# Patient Record
Sex: FEMALE | Race: WHITE | NOT HISPANIC OR LATINO | Employment: UNEMPLOYED | URBAN - METROPOLITAN AREA
[De-identification: names, ages, dates, MRNs, and addresses within clinical notes are randomized per-mention and may not be internally consistent; named-entity substitution may affect disease eponyms.]

---

## 2017-01-30 ENCOUNTER — PATIENT OUTREACH (OUTPATIENT)
Dept: ADMINISTRATIVE | Facility: HOSPITAL | Age: 72
End: 2017-01-30

## 2017-02-10 ENCOUNTER — OFFICE VISIT (OUTPATIENT)
Dept: INTERNAL MEDICINE | Facility: CLINIC | Age: 72
End: 2017-02-10
Attending: INTERNAL MEDICINE
Payer: COMMERCIAL

## 2017-02-10 VITALS
WEIGHT: 154.75 LBS | BODY MASS INDEX: 25.78 KG/M2 | OXYGEN SATURATION: 98 % | HEIGHT: 65 IN | HEART RATE: 65 BPM | DIASTOLIC BLOOD PRESSURE: 88 MMHG | SYSTOLIC BLOOD PRESSURE: 110 MMHG

## 2017-02-10 DIAGNOSIS — E78.5 HYPERLIPIDEMIA, UNSPECIFIED HYPERLIPIDEMIA TYPE: Primary | ICD-10-CM

## 2017-02-10 DIAGNOSIS — M85.80 OSTEOPENIA: ICD-10-CM

## 2017-02-10 DIAGNOSIS — A15.9 ATYPICAL TB: ICD-10-CM

## 2017-02-10 DIAGNOSIS — E06.3 HASHIMOTO'S THYROIDITIS: ICD-10-CM

## 2017-02-10 DIAGNOSIS — E55.9 VITAMIN D DEFICIENCY: ICD-10-CM

## 2017-02-10 PROCEDURE — 99214 OFFICE O/P EST MOD 30 MIN: CPT | Mod: S$GLB,,, | Performed by: INTERNAL MEDICINE

## 2017-02-10 PROCEDURE — 1157F ADVNC CARE PLAN IN RCRD: CPT | Mod: S$GLB,,, | Performed by: INTERNAL MEDICINE

## 2017-02-10 PROCEDURE — 1159F MED LIST DOCD IN RCRD: CPT | Mod: S$GLB,,, | Performed by: INTERNAL MEDICINE

## 2017-02-10 PROCEDURE — 1160F RVW MEDS BY RX/DR IN RCRD: CPT | Mod: S$GLB,,, | Performed by: INTERNAL MEDICINE

## 2017-02-10 PROCEDURE — 1126F AMNT PAIN NOTED NONE PRSNT: CPT | Mod: S$GLB,,, | Performed by: INTERNAL MEDICINE

## 2017-02-10 PROCEDURE — 99999 PR PBB SHADOW E&M-EST. PATIENT-LVL III: CPT | Mod: PBBFAC,,, | Performed by: INTERNAL MEDICINE

## 2017-02-10 RX ORDER — ATORVASTATIN CALCIUM 10 MG/1
10 TABLET, FILM COATED ORAL DAILY
Qty: 90 TABLET | Refills: 3 | Status: SHIPPED | OUTPATIENT
Start: 2017-02-10 | End: 2018-03-04 | Stop reason: SDUPTHER

## 2017-02-10 RX ORDER — LINEZOLID 600 MG/1
TABLET, FILM COATED ORAL
Refills: 3 | COMMUNITY
Start: 2017-02-06 | End: 2017-07-21 | Stop reason: ALTCHOICE

## 2017-02-10 NOTE — MR AVS SNAPSHOT
[facility]  [address]  [facility] [address]  Phone: [phone]  Fax: [phone]                  Marly Maciel   2/10/2017 9:20 AM   Office Visit    Description:  Female : 1945   Provider:  Stacy Pereira MD   Department:  [facility]           Reason for Visit     Annual Exam           Diagnoses this Visit        Comments    Hyperlipidemia, unspecified hyperlipidemia type    -  Primary     Hashimoto's thyroiditis         Atypical TB         Vitamin D deficiency disease         Vitamin D deficiency         Osteopenia                To Do List           Future Appointments        Provider Department Dept Phone    4/10/2017 8:00 AM LAB, B[facility] [phone]    4/10/2017 8:40 AM [facility] DEXA1 [facility] [phone]      Goals (5 Years of Data)     None      Follow-Up and Disposition     Return in about 1 year (around 2/10/2018), or if symptoms worsen or fail to improve.       These Medications        Disp Refills Start End    atorvastatin (LIPITOR) 10 MG tablet 90 tablet 3 2/10/2017     Take 1 tablet (10 mg total) by mouth once daily. - Oral    Pharmacy: [facility] [address] [place] [address] BILL MOREAU AT [facility] Ph #: [phone]         [facility] On Call     [facility] On Call Nurse Care Line -  Assistance  Registered nurses in the [facility] provide clinical advisement, health education, appointment booking, and other advisory services.  Call for this free service at [phone].             Medications           Message regarding Medications     Verify the changes and/or additions to your medication regime listed below are the same as discussed with your clinician today.  If any of these changes or additions are incorrect, please notify your healthcare provider.        CHANGE how you are taking these medications     Start Taking  "Instead of    atorvastatin (LIPITOR) 10 MG tablet atorvastatin (LIPITOR) 10 MG tablet    Dosage:  Take 1 tablet (10 mg total) by mouth once daily. Dosage:  TAKE ONE TABLET BY MOUTH EVERY DAY    Reason for Change:  Reorder            Verify that the below list of medications is an accurate representation of the medications you are currently taking.  If none reported, the list may be blank. If incorrect, please contact your healthcare provider. Carry this list with you in case of emergency.           Current Medications     aspirin (ECOTRIN) 81 MG EC tablet Take 81 mg by mouth once daily.      atorvastatin (LIPITOR) 10 MG tablet Take 1 tablet (10 mg total) by mouth once daily.    azithromycin (Z-HARSHAD) 250 MG tablet Take 1 tablet (250 mg total) by mouth 3 (three) times a week.    calcium-vitamin D 250-100 mg-unit per tablet Take 1 tablet by mouth 2 (two) times daily.    estrogens, conjugated, (PREMARIN) 0.625 MG tablet Take 0.625 mg by mouth twice a week.      linezolid (ZYVOX) 600 mg Tab TK 1/2 T PO D    benzonatate (TESSALON) 200 MG capsule     cholecalciferol, vitamin D3, (VITAMIN D3) 2,000 unit Tab Take 1 tablet by mouth once daily.      gabapentin (NEURONTIN) 100 MG capsule Take 1 tab in morning and 2 tabs in evening    predniSONE (DELTASONE) 20 MG tablet TAKE 2 TS PO ONCE A DAY FOR 3 DAYS    promethazine-codeine 6.25-10 mg/5 ml (PHENERGAN WITH CODEINE) 6.25-10 mg/5 mL syrup            Clinical Reference Information           Your Vitals Were     BP Pulse Height Weight SpO2 BMI    110/88 (BP Location: Right arm, Patient Position: Sitting, BP Method: Manual) 65 5' 5" (1.651 m) 70.2 kg (154 lb 12.2 oz) 98% 25.75 kg/m2      Blood Pressure          Most Recent Value    BP  110/88      Allergies as of 2/10/2017     Albuterol    Ciprofloxacin      Immunizations Administered on Date of Encounter - 2/10/2017     None      Orders Placed During Today's Visit     Future Labs/Procedures Expected by Expires    DXA Bone Density " Spine And Hip  2/10/2017 2/10/2018    Lipid panel  2/10/2017 2/10/2018    T4, free  2/10/2017 5/11/2017    TSH  2/10/2017 2/10/2018    Vitamin D  2/10/2017 5/11/2017      Instructions    Your test results will be communicated to you via: [facility], Telephone or Letter.  If you have not received your test results within one week. Please contact the clinic at [phone].           Language Assistance Services     ATTENTION: Language assistance services are available, free of charge. Please call [phone].      ATENCIÓN: Si habla español, tiene a amaya disposición servicios gratuitos de asistencia lingüística. Llame al [phone].     CHÚ Ý: N?u b?n nói Ti?ng Vi?t, có các d?ch v? h? tr? ngôn ng? mi?n phí dành cho b?n. G?i s? [phone].         [facility] complies with applicable Federal civil rights laws and does not discriminate on the basis of race, color, national origin, age, disability, or sex.

## 2017-02-10 NOTE — PATIENT INSTRUCTIONS
Your test results will be communicated to you via: [facility], Telephone or Letter.  If you have not received your test results within one week. Please contact the clinic at [phone].

## 2017-02-10 NOTE — PROGRESS NOTES
Subjective:       Patient ID: Marly Maciel is a 71 y.o. female.    Chief Complaint: Annual Exam    Hyperlipidemia   This is a chronic problem. The current episode started more than 1 year ago. The problem is controlled. Recent lipid tests were reviewed and are normal. Exacerbating diseases include hypothyroidism. She has no history of diabetes. There are no known factors aggravating her hyperlipidemia. Pertinent negatives include no chest pain or focal weakness. Current antihyperlipidemic treatment includes statins. The current treatment provides significant improvement of lipids. There are no compliance problems.  Risk factors for coronary artery disease include dyslipidemia, post-menopausal and stress.      Pt here for annual exam.   Is in drug study with pulm in [place] - taking inhaled Rx x 1 year - amikacin and linezolid   Doing well.   utd on prevnar 13  mmg wnl - updated chart  Brought recent labs - cbc and cmp reviewed  [facility]    Review of Systems   Cardiovascular: Negative for chest pain.   Neurological: Negative for focal weakness.       Objective:      Physical Exam   Constitutional: She is oriented to person, place, and time. She appears well-developed and well-nourished.   HENT:   Head: Normocephalic and atraumatic.   Right Ear: External ear normal.   Left Ear: External ear normal.   Nose: Nose normal.   Mouth/Throat: Oropharynx is clear and moist. No oropharyngeal exudate.   No carotid bruits   Eyes: Conjunctivae and EOM are normal.   Neck: Neck supple. No JVD present. No tracheal deviation present. No thyromegaly present.   Cardiovascular: Normal rate, regular rhythm, normal heart sounds and intact distal pulses.    Pulmonary/Chest: Effort normal and breath sounds normal.   Abdominal: Soft. Bowel sounds are normal.   Musculoskeletal: She exhibits no edema or tenderness.   Lymphadenopathy:     She has no cervical adenopathy.   Neurological: She is alert and oriented to  person, place, and time. Coordination normal.   Skin: Skin is warm and dry.   Psychiatric: She has a normal mood and affect. Her behavior is normal. Judgment and thought content normal.       Assessment:       Marly was seen today for annual exam.    Diagnoses and all orders for this visit:    Hyperlipidemia, unspecified hyperlipidemia type: stable, cont med  -     Lipid panel; Future    Hashimoto's thyroiditis: stable, cont med, check labs  -     TSH; Future  -     T4, free; Future    Atypical TB: followed by  pulm - currently in Rx study through pulm in [place]    Vitamin D deficiency: repeat level, cont daily otc suppl  -     Vitamin D; Future  -     DXA Bone Density Spine And Hip; Future    Osteopenia: rec otc supplement of calcium 1200mg and vit d 800u divided into 2 doses daily along with reg exercise  -     DXA Bone Density Spine And Hip; Future    Other orders  -     atorvastatin (LIPITOR) 10 MG tablet; Take 1 tablet (10 mg total) by mouth once daily.

## 2017-04-10 ENCOUNTER — HOSPITAL ENCOUNTER (OUTPATIENT)
Dept: RADIOLOGY | Facility: OTHER | Age: 72
Discharge: HOME | End: 2017-04-10
Attending: INTERNAL MEDICINE
Payer: COMMERCIAL

## 2017-04-10 DIAGNOSIS — M85.80 OSTEOPENIA: ICD-10-CM

## 2017-04-10 DIAGNOSIS — E55.9 VITAMIN D DEFICIENCY: ICD-10-CM

## 2017-04-10 PROCEDURE — 77080 DXA BONE DENSITY AXIAL: CPT | Mod: 26,,, | Performed by: RADIOLOGY

## 2017-04-10 PROCEDURE — 77080 DXA BONE DENSITY AXIAL: CPT | Mod: TC

## 2017-04-11 ENCOUNTER — TELEPHONE (OUTPATIENT)
Dept: INTERNAL MEDICINE | Facility: CLINIC | Age: 72
End: 2017-04-11

## 2017-07-10 DIAGNOSIS — Z12.11 COLON CANCER SCREENING: ICD-10-CM

## 2017-07-21 ENCOUNTER — OFFICE VISIT (OUTPATIENT)
Dept: INTERNAL MEDICINE | Facility: CLINIC | Age: 72
End: 2017-07-21
Attending: INTERNAL MEDICINE
Payer: COMMERCIAL

## 2017-07-21 ENCOUNTER — LAB VISIT (OUTPATIENT)
Dept: LAB | Facility: OTHER | Age: 72
End: 2017-07-21
Attending: INTERNAL MEDICINE
Payer: COMMERCIAL

## 2017-07-21 VITALS
WEIGHT: 152.13 LBS | DIASTOLIC BLOOD PRESSURE: 80 MMHG | SYSTOLIC BLOOD PRESSURE: 130 MMHG | HEART RATE: 80 BPM | HEIGHT: 65 IN | BODY MASS INDEX: 25.34 KG/M2 | OXYGEN SATURATION: 96 %

## 2017-07-21 DIAGNOSIS — E06.3 HASHIMOTO'S THYROIDITIS: ICD-10-CM

## 2017-07-21 DIAGNOSIS — E89.0 S/P PARTIAL THYROIDECTOMY: ICD-10-CM

## 2017-07-21 DIAGNOSIS — R00.2 PALPITATION: Primary | ICD-10-CM

## 2017-07-21 DIAGNOSIS — R00.2 PALPITATION: ICD-10-CM

## 2017-07-21 LAB
ALBUMIN SERPL BCP-MCNC: 3.9 G/DL
ALP SERPL-CCNC: 66 U/L
ALT SERPL W/O P-5'-P-CCNC: 17 U/L
ANION GAP SERPL CALC-SCNC: 11 MMOL/L
AST SERPL-CCNC: 22 U/L
BASOPHILS # BLD AUTO: 0.03 K/UL
BASOPHILS NFR BLD: 0.5 %
BILIRUB SERPL-MCNC: 0.6 MG/DL
BILIRUB UR QL STRIP: NEGATIVE
BUN SERPL-MCNC: 14 MG/DL
CALCIUM SERPL-MCNC: 9.7 MG/DL
CHLORIDE SERPL-SCNC: 104 MMOL/L
CLARITY UR: CLEAR
CO2 SERPL-SCNC: 27 MMOL/L
COLOR UR: YELLOW
CREAT SERPL-MCNC: 0.8 MG/DL
DIFFERENTIAL METHOD: ABNORMAL
EOSINOPHIL # BLD AUTO: 0.2 K/UL
EOSINOPHIL NFR BLD: 2.7 %
ERYTHROCYTE [DISTWIDTH] IN BLOOD BY AUTOMATED COUNT: 12.8 %
EST. GFR  (AFRICAN AMERICAN): >60 ML/MIN/1.73 M^2
EST. GFR  (NON AFRICAN AMERICAN): >60 ML/MIN/1.73 M^2
GLUCOSE SERPL-MCNC: 90 MG/DL
GLUCOSE UR QL STRIP: NEGATIVE
HCT VFR BLD AUTO: 46.2 %
HGB BLD-MCNC: 15.9 G/DL
HGB UR QL STRIP: NEGATIVE
KETONES UR QL STRIP: NEGATIVE
LEUKOCYTE ESTERASE UR QL STRIP: NEGATIVE
LYMPHOCYTES # BLD AUTO: 1.3 K/UL
LYMPHOCYTES NFR BLD: 19.6 %
MCH RBC QN AUTO: 32.3 PG
MCHC RBC AUTO-ENTMCNC: 34.4 G/DL
MCV RBC AUTO: 94 FL
MONOCYTES # BLD AUTO: 0.5 K/UL
MONOCYTES NFR BLD: 8.1 %
NEUTROPHILS # BLD AUTO: 4.5 K/UL
NEUTROPHILS NFR BLD: 68.9 %
NITRITE UR QL STRIP: NEGATIVE
PH UR STRIP: 6 [PH] (ref 5–8)
PLATELET # BLD AUTO: 216 K/UL
PMV BLD AUTO: 10.1 FL
POTASSIUM SERPL-SCNC: 4.8 MMOL/L
PROT SERPL-MCNC: 7 G/DL
PROT UR QL STRIP: NEGATIVE
RBC # BLD AUTO: 4.93 M/UL
SODIUM SERPL-SCNC: 142 MMOL/L
SP GR UR STRIP: 1.01 (ref 1–1.03)
TSH SERPL DL<=0.005 MIU/L-ACNC: 1.52 UIU/ML
URN SPEC COLLECT METH UR: NORMAL
UROBILINOGEN UR STRIP-ACNC: NEGATIVE EU/DL
WBC # BLD AUTO: 6.58 K/UL

## 2017-07-21 PROCEDURE — 93010 ELECTROCARDIOGRAM REPORT: CPT | Mod: S$GLB,,, | Performed by: INTERNAL MEDICINE

## 2017-07-21 PROCEDURE — 99213 OFFICE O/P EST LOW 20 MIN: CPT | Mod: S$GLB,,, | Performed by: INTERNAL MEDICINE

## 2017-07-21 PROCEDURE — 81003 URINALYSIS AUTO W/O SCOPE: CPT

## 2017-07-21 PROCEDURE — 85025 COMPLETE CBC W/AUTO DIFF WBC: CPT

## 2017-07-21 PROCEDURE — 99999 PR PBB SHADOW E&M-EST. PATIENT-LVL IV: CPT | Mod: PBBFAC,,, | Performed by: INTERNAL MEDICINE

## 2017-07-21 PROCEDURE — 87086 URINE CULTURE/COLONY COUNT: CPT

## 2017-07-21 PROCEDURE — 80053 COMPREHEN METABOLIC PANEL: CPT

## 2017-07-21 PROCEDURE — 1159F MED LIST DOCD IN RCRD: CPT | Mod: S$GLB,,, | Performed by: INTERNAL MEDICINE

## 2017-07-21 PROCEDURE — 93005 ELECTROCARDIOGRAM TRACING: CPT | Mod: S$GLB,,, | Performed by: INTERNAL MEDICINE

## 2017-07-21 PROCEDURE — 36415 COLL VENOUS BLD VENIPUNCTURE: CPT

## 2017-07-21 PROCEDURE — 1126F AMNT PAIN NOTED NONE PRSNT: CPT | Mod: S$GLB,,, | Performed by: INTERNAL MEDICINE

## 2017-07-21 PROCEDURE — 84443 ASSAY THYROID STIM HORMONE: CPT

## 2017-07-21 NOTE — PROGRESS NOTES
Subjective:       Patient ID: Marly Maciel is a 71 y.o. female.    Chief Complaint: Irregular Heart Beat    HPI     Pt here for  appt for irreg heart beat first noticed Sunday which is now x 6 days. Was lying in bed when first noticed. Reports was not feeling well Sunday with upset  Stomach described as queezy. No diarrhea or vomiting. No known sick contacts. No chest pain or pressure with this.   Reports no lightheadedness, syncope.   Has hx of atypical mycobacterium and followed by pulm in [place] - symptoms stable - has baseline [place] with going up a flight of stairs but this is unchanged from baseline.   No LE edema, orthopnea, parox nocturnal dyspnea  Reports no worsening factors. Improves with activity.   Reports drinks an ice coffee every morning - this does not affect pulse and intake of this unchanged. Will have maybe 4 oz of diet coke with lunch. No other caffeine intake  Has increased fluid intake over the past week. Still having intermittent palpitations but much less frequency. Feels better overall today.      Is no longer in trial for hx of MAC - sunshine meds x 2 months and then stopped after developed thrush    Nonsmoker. No hx of heart disease.   Denies urinary frequency, urgency, burning with urination, hematuria, foul smelling urine, cloudy urine, lower back pain, suprapubic pain or pressure.     Review of Systems   Constitutional: Positive for fatigue. Negative for chills, diaphoresis, fever and unexpected weight change.   HENT: Negative for congestion, ear pain and sore throat.    Respiratory: Negative for chest tightness and wheezing.    Cardiovascular: Positive for palpitations. Negative for chest pain and leg swelling.   Gastrointestinal: Negative for abdominal pain and diarrhea.   Musculoskeletal: Negative for joint swelling and myalgias.   Skin: Negative for color change, pallor and rash.   Neurological: Negative for dizziness, facial asymmetry, speech difficulty, numbness and headaches.        Objective:      Physical Exam   Constitutional: She is oriented to person, place, and time. She appears well-developed and well-nourished.   HENT:   Head: Normocephalic and atraumatic.   Right Ear: External ear normal.   Left Ear: External ear normal.   Nose: Nose normal.   Mouth/Throat: Oropharynx is clear and moist. No oropharyngeal exudate.   Eyes: Conjunctivae and EOM are normal.   Neck: Neck supple.   Cardiovascular: Normal rate, regular rhythm and intact distal pulses.    Pulmonary/Chest: Effort normal. She has no wheezes.   Abdominal: Soft. Normal appearance and bowel sounds are normal.   Musculoskeletal: She exhibits no edema or tenderness.   Lymphadenopathy:     She has no cervical adenopathy.   Neurological: She is alert and oriented to person, place, and time. She has normal strength. Gait normal.   Skin: Skin is warm, dry and intact. Capillary refill takes less than 2 seconds. No cyanosis. Nails show no clubbing.   Psychiatric: She has a normal mood and affect. Her speech is normal and behavior is normal. Cognition and memory are normal.       Assessment:     Marly was seen today for irregular heart beat.    Diagnoses and all orders for this visit:    Palpitation  -     IN OFFICE EKG 12-LEAD (to Muse)  -     Urinalysis  -     CULTURE, URINE  -     TSH; Future  -     Comprehensive metabolic panel; Future  -     Holter monitor - 48 hour; Future  -     Ambulatory Referral to Cardiology  -     CBC auto differential; Future    S/P partial thyroidectomy  -     TSH; Future    Hashimoto's thyroiditis  -     TSH; Future    ER and RTC prompts given  Check labs   EKG reviewed in clinic today and mild wondering baseline, NSR, no st seg changes or twi  Order holter and cards f/u for symptoms if labs unremarkable for cause  Avoid caffeine.

## 2017-07-22 LAB — BACTERIA UR CULT: NORMAL

## 2017-07-24 ENCOUNTER — PATIENT MESSAGE (OUTPATIENT)
Dept: INTERNAL MEDICINE | Facility: CLINIC | Age: 72
End: 2017-07-24

## 2017-11-21 ENCOUNTER — OFFICE VISIT (OUTPATIENT)
Dept: INTERNAL MEDICINE | Facility: CLINIC | Age: 72
End: 2017-11-21
Attending: INTERNAL MEDICINE
Payer: COMMERCIAL

## 2017-11-21 ENCOUNTER — HOSPITAL ENCOUNTER (OUTPATIENT)
Dept: RADIOLOGY | Facility: HOSPITAL | Age: 72
Discharge: HOME | End: 2017-11-21
Attending: INTERNAL MEDICINE
Payer: COMMERCIAL

## 2017-11-21 VITALS
OXYGEN SATURATION: 96 % | HEIGHT: 65 IN | WEIGHT: 152.13 LBS | HEART RATE: 74 BPM | BODY MASS INDEX: 25.34 KG/M2 | SYSTOLIC BLOOD PRESSURE: 124 MMHG | DIASTOLIC BLOOD PRESSURE: 80 MMHG

## 2017-11-21 DIAGNOSIS — J47.1 BRONCHIECTASIS WITH ACUTE EXACERBATION: ICD-10-CM

## 2017-11-21 DIAGNOSIS — R05.9 COUGH: ICD-10-CM

## 2017-11-21 DIAGNOSIS — R04.2 HEMOPTYSIS: ICD-10-CM

## 2017-11-21 DIAGNOSIS — R04.2 HEMOPTYSIS: Primary | ICD-10-CM

## 2017-11-21 DIAGNOSIS — A15.9 ATYPICAL TB: ICD-10-CM

## 2017-11-21 PROCEDURE — 71250 CT THORAX DX C-: CPT | Mod: TC

## 2017-11-21 PROCEDURE — 99999 PR PBB SHADOW E&M-EST. PATIENT-LVL III: CPT | Mod: PBBFAC,,, | Performed by: INTERNAL MEDICINE

## 2017-11-21 PROCEDURE — 71250 CT THORAX DX C-: CPT | Mod: 26,,, | Performed by: RADIOLOGY

## 2017-11-21 PROCEDURE — 99213 OFFICE O/P EST LOW 20 MIN: CPT | Mod: S$GLB,,, | Performed by: INTERNAL MEDICINE

## 2017-11-21 RX ORDER — CEFDINIR 300 MG/1
300 CAPSULE ORAL EVERY 12 HOURS
Qty: 20 CAPSULE | Refills: 0 | Status: SHIPPED | OUTPATIENT
Start: 2017-11-21 | End: 2017-12-01

## 2017-11-21 RX ORDER — LIDOCAINE HYDROCHLORIDE 20 MG/ML
SOLUTION ORAL; TOPICAL
COMMUNITY
Start: 2017-08-27 | End: 2018-07-06 | Stop reason: ALTCHOICE

## 2017-11-21 RX ORDER — PROMETHAZINE HYDROCHLORIDE AND CODEINE PHOSPHATE 6.25; 1 MG/5ML; MG/5ML
5 SOLUTION ORAL EVERY 6 HOURS PRN
Qty: 240 ML | Refills: 0 | Status: SHIPPED | OUTPATIENT
Start: 2017-11-21 | End: 2018-04-23 | Stop reason: SDUPTHER

## 2017-11-21 RX ORDER — SULFAMETHOXAZOLE AND TRIMETHOPRIM 800; 160 MG/1; MG/1
TABLET ORAL
COMMUNITY
Start: 2017-11-13 | End: 2018-07-06 | Stop reason: ALTCHOICE

## 2017-11-21 RX ORDER — HYDROCHLOROTHIAZIDE 25 MG/1
TABLET ORAL
COMMUNITY
Start: 2017-11-18 | End: 2018-08-03

## 2017-11-21 RX ORDER — CLOTRIMAZOLE 10 MG/1
LOZENGE ORAL; TOPICAL
COMMUNITY
Start: 2017-08-27 | End: 2018-07-06 | Stop reason: ALTCHOICE

## 2017-11-21 NOTE — PROGRESS NOTES
"Subjective:   Patient ID: Marly Maciel is a 71 y.o. female  Chief complaint:   Chief Complaint   Patient presents with    Cough     coughing up blood       HPI    Pt here for UC appt for productive cough  Started feeling poorly about 2 weeks ago. + sick contact was  with similar symptoms. Her cough progressed. Had fevers at onset of symptosms x 2 days - none since then.   Has hx of bronchiectasis and pulm MAC and pulm pseudomonas colonization followed by pulm/ID specialists in [place] - Dr. Brayden Marks in [place], [place] Taking azithromycin 250mg daily at baseline.   allx to cipro so avoiding FQ  Has had multiple sputum cx throughout the years due to issues above  Was given bactrim by pulm in [place] now about 9 days ago and has 1 day left of medication - was feeling better  And then Sunday then started feeling poor again - reports coughing increased.   At baseline she will on occ cough up blood - at times it can be scant or up to teaspoon when this occurs.   Yesterday when started coughing and then noticed coughing up blood in sputum. Coughed up more yesterday - described as bright red and brown at times, about a teaspoon x several episodes > 5. Last episode was a few hours before this appt.   Just resumed taking a baby aspirin after had botox 3 weeks ago  No chest pressure, no wheezing, no sob, no orthopnea. Breathing comfortably at this time.     Review of Systems    Objective:  Vitals:    11/21/17 1521   BP: 124/80   Pulse: 74   SpO2: 96%   Weight: 69 kg (152 lb 1.9 oz)   Height: 5' 5" (1.651 m)     Body mass index is 25.31 kg/m².    Physical Exam   Constitutional: She is oriented to person, place, and time. She appears well-developed and well-nourished.   HENT:   Head: Normocephalic and atraumatic.   Right Ear: External ear normal.   Left Ear: External ear normal.   Nose: Nose normal.   Mouth/Throat: Oropharynx is clear and moist. No oropharyngeal exudate.   No blood in nostrils or at post pharynx    Eyes: " Conjunctivae and EOM are normal.   Neck: Normal range of motion. Neck supple.   Cardiovascular: Normal rate, regular rhythm and intact distal pulses.    Pulmonary/Chest: Effort normal and breath sounds normal. No respiratory distress. She has no rales.   Talking in complete sentences  Few crackles heard in bilateral lung fields    Abdominal: Soft. Normal appearance and bowel sounds are normal.   Musculoskeletal: She exhibits no edema or tenderness.   Neurological: She is alert and oriented to person, place, and time. She has normal strength. Gait normal.   Skin: Skin is warm, dry and intact. Capillary refill takes less than 2 seconds. No cyanosis. No pallor. Nails show no clubbing.   No cyanosis   Psychiatric: She has a normal mood and affect. Her speech is normal and behavior is normal. Cognition and memory are normal.   Vitals reviewed.    Assessment:  1. Hemoptysis    2. Bronchiectasis with acute exacerbation    3. Atypical TB    4. Cough        Plan:  Marly was seen today for cough.    Diagnoses and all orders for this visit:    Hemoptysis  -     CT Chest Without Contrast; Future    Bronchiectasis with acute exacerbation  -     CT Chest Without Contrast; Future    Atypical TB    Cough    Other orders  -     promethazine-codeine 6.25-10 mg/5 ml (PHENERGAN WITH CODEINE) 6.25-10 mg/5 mL syrup; Take 5 mLs by mouth every 6 (six) hours as needed for Cough. Do not exceed 30ml/day  -     cefdinir (OMNICEF) 300 MG capsule; Take 1 capsule (300 mg total) by mouth every 12 (twelve) hours.    Called Dr. Brayden Marks ([phone] and [phone]) and reviewed case and details with him today.   rec stop asa   Monitor hemoptysis - reviewed ER and RTC prompts with them - if symptoms worsen or do no resolve over next 1-2 days or develops sob or new fever, will go to ER.   Reviewed respiratory cx data with Dr. Marks and will stop bactrim and start omnicef   rec pt start probiotic with this.   F/u CT scan results when  return  Keep appt with specialist   Cough med refill given    Health Maintenance   Topic Date Due    Fecal Occult Blood Test (FOBT)/FitKit  1945    Pneumococcal (65+) (2 of 2 - PPSV23) 08/30/2017    Mammogram  10/04/2017    DEXA SCAN  04/10/2020    Lipid Panel  04/10/2022    TETANUS VACCINE  06/14/2026    Hepatitis C Screening  Completed    Zoster Vaccine  Addressed    Influenza Vaccine  Completed

## 2017-11-22 ENCOUNTER — PATIENT MESSAGE (OUTPATIENT)
Dept: INTERNAL MEDICINE | Facility: CLINIC | Age: 72
End: 2017-11-22

## 2017-11-22 PROBLEM — Z87.01 HISTORY OF PSEUDOMONAS PNEUMONIA: Status: ACTIVE | Noted: 2017-11-22

## 2017-11-23 ENCOUNTER — TELEPHONE (OUTPATIENT)
Dept: INTERNAL MEDICINE | Facility: CLINIC | Age: 72
End: 2017-11-23

## 2017-11-23 NOTE — TELEPHONE ENCOUNTER
Late entry: called pt 11/22/2017 after ct scan results and reviewed results with her. Denies hemoptysis with brb since before clinic appt yesterday - started abx. Reviewed er and rtc prompts. All questions were answered and pt verbalized understanding of plan.

## 2018-03-05 RX ORDER — ATORVASTATIN CALCIUM 10 MG/1
TABLET, FILM COATED ORAL
Qty: 90 TABLET | Refills: 0 | Status: SHIPPED | OUTPATIENT
Start: 2018-03-05 | End: 2018-05-31 | Stop reason: SDUPTHER

## 2018-04-23 RX ORDER — PROMETHAZINE HYDROCHLORIDE AND CODEINE PHOSPHATE 6.25; 1 MG/5ML; MG/5ML
SOLUTION ORAL
Qty: 180 ML | Refills: 0 | Status: SHIPPED | OUTPATIENT
Start: 2018-04-23 | End: 2018-07-06 | Stop reason: SDUPTHER

## 2018-05-31 RX ORDER — ATORVASTATIN CALCIUM 10 MG/1
TABLET, FILM COATED ORAL
Qty: 90 TABLET | Refills: 0 | Status: SHIPPED | OUTPATIENT
Start: 2018-05-31 | End: 2018-09-18 | Stop reason: SDUPTHER

## 2018-05-31 NOTE — TELEPHONE ENCOUNTER
----- Message from Gwendolyn Calvillo sent at 5/31/2018  9:35 AM CDT -----  Contact: Pharmacist with [facility] / #  [phone]                                          PHARMACY  REFILL  REQUEST      Please refill the medication(s) listed below. Please call the patient when the prescription(s) is ready for  at this phone number: ALAN CORTEZ / # [phone]        Medication #1   atorvastatin (LIPITOR) 10 MG tablet  /  90 DAY SUPPLY    Preferred Pharmacy: [facility] [address]     [phone] (Phone)  [phone] (Fax

## 2018-07-05 ENCOUNTER — TELEPHONE (OUTPATIENT)
Dept: ADMINISTRATIVE | Facility: HOSPITAL | Age: 73
End: 2018-07-05

## 2018-07-06 ENCOUNTER — OFFICE VISIT (OUTPATIENT)
Dept: INTERNAL MEDICINE | Facility: CLINIC | Age: 73
End: 2018-07-06
Attending: INTERNAL MEDICINE
Payer: MEDICARE

## 2018-07-06 VITALS
HEIGHT: 65 IN | WEIGHT: 135.81 LBS | HEART RATE: 71 BPM | OXYGEN SATURATION: 97 % | BODY MASS INDEX: 22.63 KG/M2 | SYSTOLIC BLOOD PRESSURE: 134 MMHG | DIASTOLIC BLOOD PRESSURE: 80 MMHG

## 2018-07-06 DIAGNOSIS — E55.9 VITAMIN D DEFICIENCY DISEASE: ICD-10-CM

## 2018-07-06 DIAGNOSIS — E78.5 HYPERLIPIDEMIA, UNSPECIFIED HYPERLIPIDEMIA TYPE: Primary | ICD-10-CM

## 2018-07-06 DIAGNOSIS — R63.4 WEIGHT LOSS: ICD-10-CM

## 2018-07-06 DIAGNOSIS — Z86.2 HISTORY OF SARCOIDOSIS: ICD-10-CM

## 2018-07-06 DIAGNOSIS — Z12.11 SCREENING FOR COLON CANCER: ICD-10-CM

## 2018-07-06 DIAGNOSIS — E06.3 HASHIMOTO'S THYROIDITIS: ICD-10-CM

## 2018-07-06 DIAGNOSIS — I10 HYPERTENSION, BENIGN: ICD-10-CM

## 2018-07-06 DIAGNOSIS — J47.9 BRONCHIECTASIS WITHOUT COMPLICATION: ICD-10-CM

## 2018-07-06 PROCEDURE — 99999 PR PBB SHADOW E&M-EST. PATIENT-LVL III: CPT | Mod: PBBFAC,,, | Performed by: INTERNAL MEDICINE

## 2018-07-06 PROCEDURE — 99213 OFFICE O/P EST LOW 20 MIN: CPT | Mod: PBBFAC | Performed by: INTERNAL MEDICINE

## 2018-07-06 PROCEDURE — 99214 OFFICE O/P EST MOD 30 MIN: CPT | Mod: S$PBB,,, | Performed by: INTERNAL MEDICINE

## 2018-07-06 RX ORDER — PROMETHAZINE HYDROCHLORIDE AND CODEINE PHOSPHATE 6.25; 1 MG/5ML; MG/5ML
SOLUTION ORAL
Qty: 180 ML | Refills: 0 | Status: SHIPPED | OUTPATIENT
Start: 2018-07-06 | End: 2019-02-01 | Stop reason: SDUPTHER

## 2018-07-06 NOTE — PROGRESS NOTES
Subjective:   Patient ID: Marly Maciel is a 72 y.o. female  Chief complaint:   Chief Complaint   Patient presents with    Annual Exam       Hyperlipidemia   This is a chronic problem. The current episode started more than 1 year ago. The problem is controlled. Recent lipid tests were reviewed and are normal. She has no history of chronic renal disease or diabetes. There are no known factors aggravating her hyperlipidemia. Pertinent negatives include no focal sensory loss or myalgias. Current antihyperlipidemic treatment includes statins. The current treatment provides significant improvement of lipids. There are no compliance problems.  Risk factors for coronary artery disease include dyslipidemia, hypertension and post-menopausal.     Here for annual exam     HLD: sunshine lipitor  HTN: controlled on hctz    Has hx of bronchiectasis and pulm MAC and pulm pseudomonas colonization followed by pulm/ID specialists in [place] - Dr. Brayden Marks in [place], [place] Taking azithromycin 250mg daily at baseline  - taken off of asa due to intermittent hemoptysis while on this - none since stopping asa Nov 2017  - going to see pulm next week and has PFTs and chest CT at that time    Has had unintentional wt loss over the past few months  Wt 152 down to 135 - reports lost desire to eat. Will eat dilia crackers and PB and 1/2 turkey sandwich   - reports had unexplained wt loss at time of dx in 2009 of bronchiectasis and mac but stable since then   - poor appetite in general and early satiety  No abd pain or brbpr  - is still exercising - walking 2-2.5 mi 5 days per week  - no new masses or LAD    Had upper resp illness Good Friday x 2 weeks and again around 4 weeks ago x 2 weeks - at that time had low grade fevers  and inc cough - no hemoptysis   - reports this week cough improved improved to baseline- no fevers since then     -  history of hypercalcemia and hyperparathyroidism that was diagnosed and treated with surgery  "4/8/2014 at [facility].   She also underwent a partial thyroidectomy (left side)   Was on levothyroxine in past but TSH normal off of med  - followed by endocrine in the past     Had MMG recently at [facility] and wnl - due in Sept - ordered by Dr. Reyes - gyn    Osteopenia: taking tyson and MVI and exercising above     Review of Systems   Musculoskeletal: Negative for myalgias.       Objective:  Vitals:    07/06/18 0842   BP: 134/80   Pulse: 71   SpO2: 97%   Weight: 61.6 kg (135 lb 12.9 oz)   Height: 5' 5" (1.651 m)     Body mass index is 22.6 kg/m².    Physical Exam   Constitutional: She is oriented to person, place, and time. She appears well-developed and well-nourished.   Talking in complete sentences  No use of acces mm of respiration   HENT:   Head: Normocephalic and atraumatic.   Right Ear: External ear normal.   Left Ear: External ear normal.   Nose: Nose normal.   Mouth/Throat: Oropharynx is clear and moist. No oropharyngeal exudate.   No carotid bruits   Eyes: Conjunctivae and EOM are normal.   Neck: Neck supple. No thyromegaly present.   Cardiovascular: Normal rate, regular rhythm, normal heart sounds and intact distal pulses.    Pulmonary/Chest: Effort normal. She has no wheezes. She has no rales.   Few scattered rhonchi   Abdominal: Soft. Bowel sounds are normal.   Musculoskeletal: She exhibits no edema or tenderness.   Lymphadenopathy:     She has no cervical adenopathy.   Neurological: She is alert and oriented to person, place, and time.   Skin: Skin is warm and dry.   Psychiatric: Her behavior is normal. Thought content normal.   Vitals reviewed.    Assessment:  1. Hyperlipidemia, unspecified hyperlipidemia type    2. Vitamin D deficiency disease    3. Hashimoto's thyroiditis    4. Weight loss    5. Bronchiectasis without complication    6. History of sarcoidosis    7. Screening for colon cancer    8. Hypertension, benign        Plan:  Marly was seen today for annual exam.    Diagnoses and all " orders for this visit:    Hyperlipidemia, unspecified hyperlipidemia type  -     CBC auto differential; Future  -     Comprehensive metabolic panel; Future  -     TSH; Future  -     Lipid panel; Future  Controlled, cont med     Vitamin D deficiency disease  -     Vitamin D; Future  Resume daily vit d   Check level     Hashimoto's thyroiditis  Check TSH    Weight loss  -     CBC auto differential; Future  -     Comprehensive metabolic panel; Future  -     TSH; Future  -     Ambulatory referral to Gastroenterology  mmg utxi  Has f/u with pulm next week and CT chest scheduled  She agrees to schedule f/u with Dr. Hamilton for cscope as is due - also rec EGD to eval for wt loss/early satiety  She agrees to discuss this with Dr. Hamilton and arrange both  May be due to recent uri and chronic lung disease  rec inc intake - frequent small meals, boost or ensure in bt meals  rtc in 4 weeks for wt check  Consider further labs and ct abd/pelvis as that time pending review of pulm studies/notes and GI eval   rtc prompts discussed     Bronchiectasis without complication  -     promethazine-codeine 6.25-10 mg/5 ml (PHENERGAN WITH CODEINE) 6.25-10 mg/5 mL syrup; TAKE 5 ML BY MOUTH EVERY 6 HOURS AS NEEDED COUGH. DO NOT EXCEED 30 ML A DAY  Refill for prn use given    History of sarcoidosis  As above     Screening for colon cancer  -     Ambulatory referral to Gastroenterology    HTN: controlled - cont med    Health Maintenance   Topic Date Due    Fecal Occult Blood Test (FOBT)/FitKit  1945    Pneumococcal (65+) (2 of 2 - PPSV23) 08/30/2017    Mammogram  10/04/2017    Influenza Vaccine  08/01/2018    DEXA SCAN  04/10/2020    Lipid Panel  04/10/2022    TETANUS VACCINE  06/14/2026    Hepatitis C Screening  Completed    Zoster Vaccine  Addressed

## 2018-07-09 ENCOUNTER — LAB VISIT (OUTPATIENT)
Dept: LAB | Facility: OTHER | Age: 73
End: 2018-07-09
Attending: INTERNAL MEDICINE
Payer: MEDICARE

## 2018-07-09 DIAGNOSIS — E78.5 HYPERLIPIDEMIA, UNSPECIFIED HYPERLIPIDEMIA TYPE: ICD-10-CM

## 2018-07-09 DIAGNOSIS — R63.4 WEIGHT LOSS: ICD-10-CM

## 2018-07-09 DIAGNOSIS — E55.9 VITAMIN D DEFICIENCY DISEASE: ICD-10-CM

## 2018-07-09 LAB
25(OH)D3+25(OH)D2 SERPL-MCNC: 49 NG/ML
ALBUMIN SERPL BCP-MCNC: 3.9 G/DL
ALP SERPL-CCNC: 62 U/L
ALT SERPL W/O P-5'-P-CCNC: 16 U/L
ANION GAP SERPL CALC-SCNC: 8 MMOL/L
AST SERPL-CCNC: 25 U/L
BASOPHILS # BLD AUTO: 0.01 K/UL
BASOPHILS NFR BLD: 0.2 %
BILIRUB SERPL-MCNC: 0.8 MG/DL
BUN SERPL-MCNC: 14 MG/DL
CALCIUM SERPL-MCNC: 9.6 MG/DL
CHLORIDE SERPL-SCNC: 104 MMOL/L
CHOLEST SERPL-MCNC: 160 MG/DL
CHOLEST/HDLC SERPL: 2.4 {RATIO}
CO2 SERPL-SCNC: 30 MMOL/L
CREAT SERPL-MCNC: 0.7 MG/DL
DIFFERENTIAL METHOD: ABNORMAL
EOSINOPHIL # BLD AUTO: 0.1 K/UL
EOSINOPHIL NFR BLD: 2.1 %
ERYTHROCYTE [DISTWIDTH] IN BLOOD BY AUTOMATED COUNT: 13.4 %
EST. GFR  (AFRICAN AMERICAN): >60 ML/MIN/1.73 M^2
EST. GFR  (NON AFRICAN AMERICAN): >60 ML/MIN/1.73 M^2
GLUCOSE SERPL-MCNC: 92 MG/DL
HCT VFR BLD AUTO: 43.3 %
HDLC SERPL-MCNC: 67 MG/DL
HDLC SERPL: 41.9 %
HGB BLD-MCNC: 14.1 G/DL
LDLC SERPL CALC-MCNC: 78.4 MG/DL
LYMPHOCYTES # BLD AUTO: 1.2 K/UL
LYMPHOCYTES NFR BLD: 26.4 %
MCH RBC QN AUTO: 31.4 PG
MCHC RBC AUTO-ENTMCNC: 32.6 G/DL
MCV RBC AUTO: 96 FL
MONOCYTES # BLD AUTO: 0.4 K/UL
MONOCYTES NFR BLD: 9.9 %
NEUTROPHILS # BLD AUTO: 2.7 K/UL
NEUTROPHILS NFR BLD: 61.2 %
NONHDLC SERPL-MCNC: 93 MG/DL
PLATELET # BLD AUTO: 212 K/UL
PMV BLD AUTO: 9.9 FL
POTASSIUM SERPL-SCNC: 4.8 MMOL/L
PROT SERPL-MCNC: 6.8 G/DL
RBC # BLD AUTO: 4.49 M/UL
SODIUM SERPL-SCNC: 142 MMOL/L
TRIGL SERPL-MCNC: 73 MG/DL
TSH SERPL DL<=0.005 MIU/L-ACNC: 1.33 UIU/ML
WBC # BLD AUTO: 4.36 K/UL

## 2018-07-09 PROCEDURE — 80061 LIPID PANEL: CPT

## 2018-07-09 PROCEDURE — 82306 VITAMIN D 25 HYDROXY: CPT

## 2018-07-09 PROCEDURE — 85025 COMPLETE CBC W/AUTO DIFF WBC: CPT

## 2018-07-09 PROCEDURE — 36415 COLL VENOUS BLD VENIPUNCTURE: CPT

## 2018-07-09 PROCEDURE — 80053 COMPREHEN METABOLIC PANEL: CPT

## 2018-07-09 PROCEDURE — 84443 ASSAY THYROID STIM HORMONE: CPT

## 2018-08-03 ENCOUNTER — LAB VISIT (OUTPATIENT)
Dept: LAB | Facility: OTHER | Age: 73
End: 2018-08-03
Attending: INTERNAL MEDICINE
Payer: MEDICARE

## 2018-08-03 ENCOUNTER — OFFICE VISIT (OUTPATIENT)
Dept: INTERNAL MEDICINE | Facility: CLINIC | Age: 73
End: 2018-08-03
Attending: INTERNAL MEDICINE
Payer: MEDICARE

## 2018-08-03 VITALS
HEART RATE: 70 BPM | DIASTOLIC BLOOD PRESSURE: 88 MMHG | OXYGEN SATURATION: 99 % | WEIGHT: 133.63 LBS | HEIGHT: 65 IN | BODY MASS INDEX: 22.26 KG/M2 | SYSTOLIC BLOOD PRESSURE: 136 MMHG

## 2018-08-03 DIAGNOSIS — R63.4 UNINTENTIONAL WEIGHT LOSS: Primary | ICD-10-CM

## 2018-08-03 DIAGNOSIS — R63.4 UNINTENTIONAL WEIGHT LOSS: ICD-10-CM

## 2018-08-03 DIAGNOSIS — R06.02 SOB (SHORTNESS OF BREATH): ICD-10-CM

## 2018-08-03 DIAGNOSIS — I77.819 AORTIC ECTASIA: ICD-10-CM

## 2018-08-03 DIAGNOSIS — Z23 NEED FOR PNEUMOCOCCAL VACCINATION: ICD-10-CM

## 2018-08-03 LAB
CRP SERPL-MCNC: 0.8 MG/L
ERYTHROCYTE [SEDIMENTATION RATE] IN BLOOD: 5 MM/HR

## 2018-08-03 PROCEDURE — 99999 PR PBB SHADOW E&M-EST. PATIENT-LVL III: CPT | Mod: PBBFAC,,, | Performed by: INTERNAL MEDICINE

## 2018-08-03 PROCEDURE — 99213 OFFICE O/P EST LOW 20 MIN: CPT | Mod: PBBFAC | Performed by: INTERNAL MEDICINE

## 2018-08-03 PROCEDURE — 86140 C-REACTIVE PROTEIN: CPT

## 2018-08-03 PROCEDURE — 36415 COLL VENOUS BLD VENIPUNCTURE: CPT

## 2018-08-03 PROCEDURE — 84165 PROTEIN E-PHORESIS SERUM: CPT | Mod: 26,,, | Performed by: PATHOLOGY

## 2018-08-03 PROCEDURE — 84165 PROTEIN E-PHORESIS SERUM: CPT

## 2018-08-03 PROCEDURE — 99214 OFFICE O/P EST MOD 30 MIN: CPT | Mod: S$PBB,,, | Performed by: INTERNAL MEDICINE

## 2018-08-03 PROCEDURE — 85651 RBC SED RATE NONAUTOMATED: CPT

## 2018-08-03 NOTE — PROGRESS NOTES
"Subjective:   Patient ID: Marly Maciel is a 72 y.o. female  Chief complaint:   Chief Complaint   Patient presents with    Follow-up     weight       HPI  Pt here for f/u of unintentional wt loss   My prev note reviewed     [facility] and [facility] ate more than usual and did not exercise   Gained 3 pounds when returned and then lost wt since then - weight dec 2 pounds since last seen in clinic   Hx of chronic bronchiectasis - followed by specialist in [place]      Cscope: pt is seeing Dr. Hamilton 8/31 to arrange egd/cscope  occ nausea   No abd pain  No fevers or chills     Had echo in [place] and pap was high end of normal - checked 4-5 years ago   Has occ sob which in past has been attrib to bronchiectsis and chronic pulm infection. clinci note received from pulm in [place] and reviewed     Had chest CT - aortic ectasia increased and rec repeat CT chest 7/2019 which her pulm is following.   He is also repeating CT chest Oct 2017 to eval pulm MAC    mmg ordered through gyn at [facility] - last was sept 2017 neg    Review of Systems    Objective:  Vitals:    08/03/18 1241   BP: 136/88   BP Location: Left arm   Patient Position: Sitting   BP Method: Medium (Manual)   Pulse: 70   SpO2: 99%   Weight: 60.6 kg (133 lb 9.6 oz)   Height: 5' 5" (1.651 m)     Body mass index is 22.23 kg/m².    Physical Exam   Constitutional: She is oriented to person, place, and time. She appears well-developed and well-nourished.   HENT:   Head: Normocephalic and atraumatic.   Right Ear: External ear normal.   Left Ear: External ear normal.   Nose: Nose normal.   Mouth/Throat: Oropharynx is clear and moist. No oropharyngeal exudate.   No carotid bruits   Eyes: Conjunctivae and EOM are normal.   Neck: Neck supple. No thyromegaly present.   Cardiovascular: Normal rate, regular rhythm and intact distal pulses.    Pulmonary/Chest: Effort normal. She has no wheezes. She exhibits no mass, no bony tenderness, no crepitus and no retraction. Right " breast exhibits no inverted nipple, no mass, no nipple discharge, no skin change and no tenderness. Left breast exhibits no inverted nipple, no mass, no nipple discharge, no skin change and no tenderness. Breasts are symmetrical. There is no breast swelling.   Abdominal: Soft. Bowel sounds are normal.   Genitourinary: No breast bleeding.   Musculoskeletal: She exhibits no edema or tenderness.   Lymphadenopathy:     She has no cervical adenopathy.     She has no axillary adenopathy.        Right: No inguinal, no supraclavicular and no epitrochlear adenopathy present.        Left: No inguinal, no supraclavicular and no epitrochlear adenopathy present.   Neurological: She is alert and oriented to person, place, and time.   Skin: Skin is warm and dry.   Psychiatric: Her behavior is normal. Thought content normal.   Vitals reviewed.  right ant cerv LN <1 cm - soft mobile - per pt this is stable over many years     Assessment:  1. Unintentional weight loss    2. Need for pneumococcal vaccination    3. SOB (shortness of breath)    4. Aortic ectasia        Plan:  Marly was seen today for follow-up.    Diagnoses and all orders for this visit:    Unintentional weight loss  -     Protein electrophoresis, serum; Future  -     Protein electrophoresis, timed urine; Future  -     Sedimentation rate; Future  -     C-reactive protein; Future  -     CT Abdomen Pelvis W Wo Contrast; Future  Check additional labs, CT abd/pelvic  Chest CT performed by pulm recently and report in media tab  F/u with gi for endoscopy   Suspect due to chronic lung disease but eval for other causes     SOB (shortness of breath)  -     2D Echo w/ Color Flow Doppler; Future  Chronic - check echo to eval pap    Aortic ectasia  -     2D Echo w/ Color Flow Doppler; Future  Seen on ct chest by pulm - to have repeat in 3 months and 1 year - if inc in size will refer to vascular       Health Maintenance   Topic Date Due    Pneumococcal (65+) (2 of 2 - PPSV23)  08/30/2017    Influenza Vaccine  08/01/2018    Mammogram  09/06/2019    DEXA SCAN  04/10/2020    Lipid Panel  07/09/2023    TETANUS VACCINE  06/14/2026    Colonoscopy  06/14/2026    Hepatitis C Screening  Completed    Zoster Vaccine  Addressed

## 2018-08-06 LAB
ALBUMIN SERPL ELPH-MCNC: 4.08 G/DL
ALPHA1 GLOB SERPL ELPH-MCNC: 0.32 G/DL
ALPHA2 GLOB SERPL ELPH-MCNC: 0.7 G/DL
B-GLOBULIN SERPL ELPH-MCNC: 0.77 G/DL
GAMMA GLOB SERPL ELPH-MCNC: 1.04 G/DL
PATHOLOGIST INTERPRETATION SPE: NORMAL
PROT SERPL-MCNC: 6.9 G/DL

## 2018-08-07 ENCOUNTER — HOSPITAL ENCOUNTER (OUTPATIENT)
Dept: CARDIOLOGY | Facility: OTHER | Age: 73
Discharge: HOME | End: 2018-08-07
Attending: INTERNAL MEDICINE
Payer: MEDICARE

## 2018-08-07 ENCOUNTER — HOSPITAL ENCOUNTER (OUTPATIENT)
Dept: RADIOLOGY | Facility: OTHER | Age: 73
Discharge: HOME | End: 2018-08-07
Attending: INTERNAL MEDICINE
Payer: MEDICARE

## 2018-08-07 DIAGNOSIS — R63.4 UNINTENTIONAL WEIGHT LOSS: ICD-10-CM

## 2018-08-07 DIAGNOSIS — I77.819 AORTIC ECTASIA: ICD-10-CM

## 2018-08-07 DIAGNOSIS — R06.02 SOB (SHORTNESS OF BREATH): ICD-10-CM

## 2018-08-07 PROCEDURE — 74177 CT ABD & PELVIS W/CONTRAST: CPT | Mod: 26,,, | Performed by: RADIOLOGY

## 2018-08-07 PROCEDURE — 25500020 PHARM REV CODE 255: Performed by: INTERNAL MEDICINE

## 2018-08-07 PROCEDURE — 74177 CT ABD & PELVIS W/CONTRAST: CPT | Mod: TC

## 2018-08-07 PROCEDURE — 93306 TTE W/DOPPLER COMPLETE: CPT

## 2018-08-07 RX ADMIN — IOHEXOL 30 ML: 350 INJECTION, SOLUTION INTRAVENOUS at 11:08

## 2018-08-07 RX ADMIN — IOHEXOL 75 ML: 350 INJECTION, SOLUTION INTRAVENOUS at 11:08

## 2018-08-08 LAB
DIASTOLIC DYSFUNCTION: YES
ESTIMATED PA SYSTOLIC PRESSURE: 27.04
MITRAL VALVE REGURGITATION: ABNORMAL
RETIRED EF AND QEF - SEE NOTES: 72 (ref 55–65)
TRICUSPID VALVE REGURGITATION: ABNORMAL

## 2018-08-09 ENCOUNTER — TELEPHONE (OUTPATIENT)
Dept: INTERNAL MEDICINE | Facility: CLINIC | Age: 73
End: 2018-08-09

## 2018-08-09 DIAGNOSIS — R63.4 UNEXPLAINED WEIGHT LOSS: Primary | ICD-10-CM

## 2018-08-09 DIAGNOSIS — N28.1 RENAL CYST: ICD-10-CM

## 2018-08-09 NOTE — TELEPHONE ENCOUNTER
Called pt and scheduled her renal Us and told her to come to clinic to retrieve the urine container for UPEP. Pt verbalized understanding

## 2018-08-09 NOTE — TELEPHONE ENCOUNTER
Called pt and reviewed CT scan, echo and labs     Urine lab - UPEP - is still processing - called pt and urine given in lab but not 24h collect - please call lab and inquire if this is processing or not?     Will reorder UPEP as will likely need to resubmit as does not sound like 24h urine was collected - please arrange with pt to obtain collection container and to schedule specimen drop off date    Please schedule renal us and UPEP on same day - thanks!

## 2018-08-10 DIAGNOSIS — Z12.11 COLON CANCER SCREENING: ICD-10-CM

## 2018-08-13 ENCOUNTER — HOSPITAL ENCOUNTER (OUTPATIENT)
Dept: RADIOLOGY | Facility: OTHER | Age: 73
Discharge: HOME | End: 2018-08-13
Attending: INTERNAL MEDICINE
Payer: MEDICARE

## 2018-08-13 DIAGNOSIS — N28.1 RENAL CYST: ICD-10-CM

## 2018-08-13 PROCEDURE — 76770 US EXAM ABDO BACK WALL COMP: CPT | Mod: TC

## 2018-08-13 PROCEDURE — 76770 US EXAM ABDO BACK WALL COMP: CPT | Mod: 26,,, | Performed by: RADIOLOGY

## 2018-08-14 ENCOUNTER — LAB VISIT (OUTPATIENT)
Dept: LAB | Facility: OTHER | Age: 73
End: 2018-08-14
Attending: INTERNAL MEDICINE
Payer: MEDICARE

## 2018-08-14 DIAGNOSIS — R63.4 UNEXPLAINED WEIGHT LOSS: ICD-10-CM

## 2018-08-14 LAB
PROT 24H UR-MRATE: NORMAL MG/SPEC
PROT UR-MCNC: <7 MG/DL
URINE COLLECTION DURATION: 24 HR
URINE VOLUME: 860 ML

## 2018-08-14 PROCEDURE — 84166 PROTEIN E-PHORESIS/URINE/CSF: CPT | Mod: 26,,, | Performed by: PATHOLOGY

## 2018-08-14 PROCEDURE — 84166 PROTEIN E-PHORESIS/URINE/CSF: CPT

## 2018-08-14 PROCEDURE — 84156 ASSAY OF PROTEIN URINE: CPT

## 2018-08-15 ENCOUNTER — PATIENT MESSAGE (OUTPATIENT)
Dept: INTERNAL MEDICINE | Facility: CLINIC | Age: 73
End: 2018-08-15

## 2018-08-16 LAB
PATHOLOGIST INTERPRETATION UPE: NORMAL
PROT PATTERN UR ELPH-IMP: NORMAL

## 2018-09-06 ENCOUNTER — TELEPHONE (OUTPATIENT)
Dept: ADMINISTRATIVE | Facility: HOSPITAL | Age: 73
End: 2018-09-06

## 2018-09-06 NOTE — TELEPHONE ENCOUNTER
EGD/COLONOSCOPY COMPETED AT [facility] ON 9.4.18  REPORT SCANNED INTO PATIENT CHART CAN BE VIEWED UNDER MEDIA TAB.

## 2018-09-18 RX ORDER — ATORVASTATIN CALCIUM 10 MG/1
TABLET, FILM COATED ORAL
Qty: 90 TABLET | Refills: 3 | Status: SHIPPED | OUTPATIENT
Start: 2018-09-18 | End: 2019-08-30 | Stop reason: SDUPTHER

## 2019-01-08 ENCOUNTER — PATIENT MESSAGE (OUTPATIENT)
Dept: INTERNAL MEDICINE | Facility: CLINIC | Age: 74
End: 2019-01-08

## 2019-01-09 ENCOUNTER — PATIENT MESSAGE (OUTPATIENT)
Dept: INTERNAL MEDICINE | Facility: CLINIC | Age: 74
End: 2019-01-09

## 2019-01-16 ENCOUNTER — CLINICAL SUPPORT (OUTPATIENT)
Dept: INTERNAL MEDICINE | Facility: CLINIC | Age: 74
End: 2019-01-16
Payer: MEDICARE

## 2019-01-16 ENCOUNTER — TELEPHONE (OUTPATIENT)
Dept: INTERNAL MEDICINE | Facility: CLINIC | Age: 74
End: 2019-01-16

## 2019-01-16 VITALS — HEART RATE: 71 BPM | SYSTOLIC BLOOD PRESSURE: 160 MMHG | DIASTOLIC BLOOD PRESSURE: 98 MMHG | OXYGEN SATURATION: 98 %

## 2019-01-16 PROCEDURE — 99999 PR PBB SHADOW E&M-EST. PATIENT-LVL I: ICD-10-PCS | Mod: PBBFAC,,,

## 2019-01-16 PROCEDURE — 99999 PR PBB SHADOW E&M-EST. PATIENT-LVL I: CPT | Mod: PBBFAC,,,

## 2019-01-16 PROCEDURE — 99211 OFF/OP EST MAY X REQ PHY/QHP: CPT | Mod: PBBFAC

## 2019-01-16 RX ORDER — LOSARTAN POTASSIUM 50 MG/1
50 TABLET ORAL DAILY
Qty: 30 TABLET | Refills: 1 | Status: SHIPPED | OUTPATIENT
Start: 2019-01-16 | End: 2019-03-18 | Stop reason: SDUPTHER

## 2019-01-16 NOTE — PROGRESS NOTES
Marly PRINGLE Raheem 73 y.o. female is here today for Blood Pressure check.   History of HTN no.    Review of patient's allergies indicates:   Allergen Reactions    Albuterol Other (See Comments)     faint    Ciprofloxacin Other (See Comments)     Central nervous system issues     Creatinine   Date Value Ref Range Status   07/09/2018 0.7 0.5 - 1.4 mg/dL Final     Sodium   Date Value Ref Range Status   07/09/2018 142 136 - 145 mmol/L Final     Potassium   Date Value Ref Range Status   07/09/2018 4.8 3.5 - 5.1 mmol/L Final   ]  Patient denies taking blood pressure medications on a regular basis at the same time of the day.     Current Outpatient Medications:     atorvastatin (LIPITOR) 10 MG tablet, TAKE 1 TABLET BY MOUTH EVERY DAY, Disp: 90 tablet, Rfl: 3    azithromycin (Z-HARSHAD) 250 MG tablet, Take 1 tablet (250 mg total) by mouth 3 (three) times a week., Disp: 90 tablet, Rfl: 0    estrogens, conjugated, (PREMARIN) 0.625 MG tablet, Take 0.625 mg by mouth twice a week.  , Disp: , Rfl:     promethazine-codeine 6.25-10 mg/5 ml (PHENERGAN WITH CODEINE) 6.25-10 mg/5 mL syrup, TAKE 5 ML BY MOUTH EVERY 6 HOURS AS NEEDED COUGH. DO NOT EXCEED 30 ML A DAY, Disp: 180 mL, Rfl: 0  Does patient have record of home blood pressure readings yes. Readings have been averaging 130's-140's/80.   Last dose of blood pressure medication was taken at ( patient is not on blood pressure medication).  Patient is asymptomatic.     .    BP: (!) 160/98 , Pulse: 71 .    Blood pressure reading after 15 minutes was 138/82, Pulse 68.  Dr. Pereira notified.

## 2019-01-16 NOTE — TELEPHONE ENCOUNTER
----- Message from Malu Velazquez LPN sent at 1/16/2019 10:00 AM CST -----  Patieint blood pressure 160/98  After 20min 138/82  HR 68.  Patienit asymptomatic

## 2019-01-16 NOTE — TELEPHONE ENCOUNTER
Reviewed bp readings reported by pt and at clinic visit - rec start losartan 50mg daily to keep bp persistently < 130/80    rx sent to pharmacy   rec pt start this and cont home bp monitoring   Please arrange [place] in 2 weeks for repeat bp check - thanks!

## 2019-01-17 NOTE — TELEPHONE ENCOUNTER
Spoke w/ pt and informed her of PCP updates to plan regarding BP .  Pt verbally understand and agree to  new RX from the pharmacy .  Also pt agree to come in 2 weeks to have BP check     Pt has no further questions or concerns.

## 2019-01-30 ENCOUNTER — PATIENT MESSAGE (OUTPATIENT)
Dept: INTERNAL MEDICINE | Facility: CLINIC | Age: 74
End: 2019-01-30

## 2019-01-30 DIAGNOSIS — E21.0 PRIMARY HYPERPARATHYROIDISM: Primary | ICD-10-CM

## 2019-01-30 DIAGNOSIS — L65.9 HAIR LOSS: ICD-10-CM

## 2019-01-30 NOTE — TELEPHONE ENCOUNTER
Thyroid labs ordered     Ok to take 1/2 dose of blood pressure medication since pressure was so low on full dose. rec that she continue to monitor blood pressure at home and let me know if she has any concerns about medication dosing or blood pressure readings. Thanks!

## 2019-01-31 ENCOUNTER — PATIENT MESSAGE (OUTPATIENT)
Dept: INTERNAL MEDICINE | Facility: CLINIC | Age: 74
End: 2019-01-31

## 2019-01-31 DIAGNOSIS — J47.9 BRONCHIECTASIS WITHOUT COMPLICATION: ICD-10-CM

## 2019-01-31 NOTE — TELEPHONE ENCOUNTER
----- Message from Mariela Valente sent at 1/31/2019 11:39 AM CST -----  Contact: pt   Name of Who is Calling: ALAN CORTEZ [[id number]]       What is the request in detail:  Patient is returning a call she states she needs to know if she still needs to come to her scheduled appointment for today or should she just keep monitoring her pressure at home.....Please contact to further discuss and advise.       Can the clinic reply by [facility]: yes       [facility]: [phone]

## 2019-01-31 NOTE — TELEPHONE ENCOUNTER
Sent message to inform pt of Dr. Pereira's recommendation and asking for day and time to schedule thyroid labs

## 2019-02-01 ENCOUNTER — CLINICAL SUPPORT (OUTPATIENT)
Dept: INTERNAL MEDICINE | Facility: CLINIC | Age: 74
End: 2019-02-01
Payer: MEDICARE

## 2019-02-01 ENCOUNTER — LAB VISIT (OUTPATIENT)
Dept: LAB | Facility: OTHER | Age: 74
End: 2019-02-01
Attending: INTERNAL MEDICINE
Payer: MEDICARE

## 2019-02-01 ENCOUNTER — PATIENT MESSAGE (OUTPATIENT)
Dept: INTERNAL MEDICINE | Facility: CLINIC | Age: 74
End: 2019-02-01

## 2019-02-01 VITALS — DIASTOLIC BLOOD PRESSURE: 84 MMHG | OXYGEN SATURATION: 98 % | HEART RATE: 69 BPM | SYSTOLIC BLOOD PRESSURE: 136 MMHG

## 2019-02-01 DIAGNOSIS — L65.9 HAIR LOSS: ICD-10-CM

## 2019-02-01 LAB
T3 SERPL-MCNC: 99 NG/DL
T4 FREE SERPL-MCNC: 1.01 NG/DL
TSH SERPL DL<=0.005 MIU/L-ACNC: 1.81 UIU/ML

## 2019-02-01 PROCEDURE — 84480 ASSAY TRIIODOTHYRONINE (T3): CPT

## 2019-02-01 PROCEDURE — 84443 ASSAY THYROID STIM HORMONE: CPT

## 2019-02-01 PROCEDURE — 84439 ASSAY OF FREE THYROXINE: CPT

## 2019-02-01 PROCEDURE — 99212 OFFICE O/P EST SF 10 MIN: CPT | Mod: PBBFAC

## 2019-02-01 PROCEDURE — 36415 COLL VENOUS BLD VENIPUNCTURE: CPT

## 2019-02-01 PROCEDURE — 99999 PR PBB SHADOW E&M-EST. PATIENT-LVL II: ICD-10-PCS | Mod: PBBFAC,,,

## 2019-02-01 PROCEDURE — 99999 PR PBB SHADOW E&M-EST. PATIENT-LVL II: CPT | Mod: PBBFAC,,,

## 2019-02-01 RX ORDER — PROMETHAZINE HYDROCHLORIDE AND CODEINE PHOSPHATE 6.25; 1 MG/5ML; MG/5ML
SOLUTION ORAL
Qty: 180 ML | Refills: 0 | Status: SHIPPED | OUTPATIENT
Start: 2019-02-01 | End: 2020-02-11

## 2019-02-01 NOTE — PROGRESS NOTES
Marly PRINGLE Raheem 73 y.o. female is here today for Blood Pressure check.   History of HTN yes.    Review of patient's allergies indicates:   Allergen Reactions    Albuterol Other (See Comments)     faint    Ciprofloxacin Other (See Comments)     Central nervous system issues     Creatinine   Date Value Ref Range Status   07/09/2018 0.7 0.5 - 1.4 mg/dL Final     Sodium   Date Value Ref Range Status   07/09/2018 142 136 - 145 mmol/L Final     Potassium   Date Value Ref Range Status   07/09/2018 4.8 3.5 - 5.1 mmol/L Final   ]  Patient verifies taking blood pressure medications on a regular bases at the same time of the day.     Current Outpatient Medications:     losartan (COZAAR) 50 MG tablet, Take 1 tablet (50 mg total) by mouth once daily. (Patient taking differently: Take 25 mg by mouth once daily. ), Disp: 30 tablet, Rfl: 1    atorvastatin (LIPITOR) 10 MG tablet, TAKE 1 TABLET BY MOUTH EVERY DAY, Disp: 90 tablet, Rfl: 3    azithromycin (Z-HARSHAD) 250 MG tablet, Take 1 tablet (250 mg total) by mouth 3 (three) times a week., Disp: 90 tablet, Rfl: 0    estrogens, conjugated, (PREMARIN) 0.625 MG tablet, Take 0.625 mg by mouth twice a week.  , Disp: , Rfl:     promethazine-codeine 6.25-10 mg/5 ml (PHENERGAN WITH CODEINE) 6.25-10 mg/5 mL syrup, TAKE 5 ML BY MOUTH EVERY 6 HOURS AS NEEDED COUGH. DO NOT EXCEED 30 ML A DAY, Disp: 180 mL, Rfl: 0  Does patient have record of home blood pressure readings yes. Readings have been averaging 115-130's/60's-70's.   Last dose of blood pressure medication was taken at 5:00 am.  Patient is asymptomatic.   Denies Headaches, dizziness, CP, SOB, blurred vision, numbness/tingling to extremities.    BP: 136/84 , Pulse: 69.    Dr. Pereira notified.

## 2019-02-01 NOTE — Clinical Note
Does patient have record of home blood pressure readings yes. Readings have been averaging 115-130's/60's-70's. Last dose of blood pressure medication was taken at 5:00 am.Patient is asymptomatic. Denies Headaches, dizziness, CP, SOB, blurred vision, numbness/tingling to extremities.BP: 136/84 , Pulse: 69.Dr. Pereira notified.

## 2019-03-18 RX ORDER — LOSARTAN POTASSIUM 50 MG/1
25 TABLET ORAL DAILY
Qty: 45 TABLET | Refills: 3 | Status: SHIPPED | OUTPATIENT
Start: 2019-03-18 | End: 2019-08-30 | Stop reason: SDUPTHER

## 2019-04-11 ENCOUNTER — PATIENT MESSAGE (OUTPATIENT)
Dept: INTERNAL MEDICINE | Facility: CLINIC | Age: 74
End: 2019-04-11

## 2019-04-12 ENCOUNTER — TELEPHONE (OUTPATIENT)
Dept: INTERNAL MEDICINE | Facility: CLINIC | Age: 74
End: 2019-04-12

## 2019-04-12 NOTE — TELEPHONE ENCOUNTER
Late entry:  contacted patient early this morning around 9am to review message sent through portal.   Patient has a history of pulmonary MAC - followed by specialist in [place]. Per pt, she 'has not been doing well over the past 6 months'and her pulmonologist recommended IV antibiotics.  She reports that this is not emergent.  She is in no respiratory distress. No fever.  No new cough.  She reports that her pulmonologist will be reaching out to me today.  I have to contact numbers for him and called him and left a message on voicemail.     Later today:  I contacted the patient and notified her that I was able to leave a message for Dr. Marks and the awaiting a call back.     At 4pm today, Dr. Marks ([phone]) called me back and reviewed his plans briefly - he reports that he will draft an email and send it to me directly and confirmed that this is not emergent and can be handled next week.     He is recommending starting 3 antibiotics- starting with oral linezolid, and then adding IV amikacin and tigecycline to see how she responds.   - she was previously treated with linezolid and inhaled amikacin but developed recurrence of thrush due to the inhaled medication and this was stopped.     Once email received, will coord with picc team for placement,  or our infusion clinic pending insurance, and ID for coordinating monitoring.

## 2019-04-16 ENCOUNTER — PATIENT MESSAGE (OUTPATIENT)
Dept: INTERNAL MEDICINE | Facility: CLINIC | Age: 74
End: 2019-04-16

## 2019-04-17 NOTE — TELEPHONE ENCOUNTER
Called and spoke to pt today about plan     Received feedback from pulm and ID - she will f/u with [facility] with Dr. Taylor - her pulm specialist Dr. Marks will set this up directly with Dr. Taylor at [facility] and will notify pt once this is complete.     All questions were answered and pt verbalized understanding of plan.

## 2019-05-07 ENCOUNTER — HOSPITAL ENCOUNTER (OUTPATIENT)
Facility: OTHER | Age: 74
Discharge: HOME | End: 2019-05-07
Attending: RADIOLOGY | Admitting: RADIOLOGY
Payer: MEDICARE

## 2019-05-07 VITALS
RESPIRATION RATE: 16 BRPM | SYSTOLIC BLOOD PRESSURE: 138 MMHG | WEIGHT: 118 LBS | DIASTOLIC BLOOD PRESSURE: 87 MMHG | TEMPERATURE: 98 F | HEIGHT: 65 IN | OXYGEN SATURATION: 100 % | BODY MASS INDEX: 19.66 KG/M2 | HEART RATE: 74 BPM

## 2019-05-07 DIAGNOSIS — A31.0 MYCOBACTERIUM AVIUM COMPLEX: ICD-10-CM

## 2019-05-07 PROCEDURE — C1894 INTRO/SHEATH, NON-LASER: HCPCS | Performed by: RADIOLOGY

## 2019-05-07 PROCEDURE — 63600175 PHARM REV CODE 636 W HCPCS: Performed by: RADIOLOGY

## 2019-05-07 PROCEDURE — C1751 CATH, INF, PER/CENT/MIDLINE: HCPCS | Performed by: RADIOLOGY

## 2019-05-07 PROCEDURE — 99153 MOD SED SAME PHYS/QHP EA: CPT | Performed by: RADIOLOGY

## 2019-05-07 PROCEDURE — C1769 GUIDE WIRE: HCPCS | Performed by: RADIOLOGY

## 2019-05-07 PROCEDURE — 99152 MOD SED SAME PHYS/QHP 5/>YRS: CPT | Performed by: RADIOLOGY

## 2019-05-07 PROCEDURE — 25000003 PHARM REV CODE 250: Performed by: RADIOLOGY

## 2019-05-07 DEVICE — IMPLANTABLE DEVICE: Type: IMPLANTABLE DEVICE | Site: ARM | Status: FUNCTIONAL

## 2019-05-07 RX ORDER — LIDOCAINE HYDROCHLORIDE 10 MG/ML
INJECTION INFILTRATION; PERINEURAL
Status: DISCONTINUED | OUTPATIENT
Start: 2019-05-07 | End: 2019-05-07 | Stop reason: HOSPADM

## 2019-05-07 RX ORDER — AZITHROMYCIN 250 MG/1
250 TABLET, FILM COATED ORAL
COMMUNITY
End: 2020-02-11

## 2019-05-07 RX ORDER — MIDAZOLAM HYDROCHLORIDE 1 MG/ML
INJECTION, SOLUTION INTRAMUSCULAR; INTRAVENOUS
Status: DISCONTINUED | OUTPATIENT
Start: 2019-05-07 | End: 2019-05-07 | Stop reason: HOSPADM

## 2019-05-07 RX ORDER — FENTANYL CITRATE 50 UG/ML
INJECTION, SOLUTION INTRAMUSCULAR; INTRAVENOUS
Status: DISCONTINUED | OUTPATIENT
Start: 2019-05-07 | End: 2019-05-07 | Stop reason: HOSPADM

## 2019-05-07 RX ORDER — CHOLECALCIFEROL (VITAMIN D3) 125 MCG
1 CAPSULE ORAL DAILY
COMMUNITY
End: 2020-02-11

## 2019-05-07 NOTE — PLAN OF CARE
Marly Maciel has met all discharge criteria from Phase II. Vital Signs are stable, ambulating  without difficulty. Discharge instructions given, patient verbalized understanding. Discharged from facility via wheelchair in stable condition.

## 2019-05-07 NOTE — DISCHARGE INSTRUCTIONS
Anesthesia: Monitored Anesthesia Care (MAC)  Anesthesia safety  Tips for anesthesia safety include the following:   · Follow all instructions you are given for how long not to eat or drink before your procedure.  · Be sure your healthcare provider knows what medicines you take, especially any anti-inflammatory medicine or blood thinners. This includes aspirin and any other over-the-counter medicines, herbs, and supplements.  · Have an adult family member or friend drive you home after the procedure.  · For the first 24 hours after your surgery:  ¨ Do not drive or use heavy equipment.  ¨ Do not make important decisions or sign documents.  ¨ Avoid alcohol.  ¨ Have someone stay with you, if possible. They can watch for problems and help keep you safe.  Date Last Reviewed: 12/1/2016 © 2000-2017 [employer]. [address] [place] [address], [place] [address]. All rights reserved. This information is not intended as a substitute for professional medical care. Always follow your healthcare professional's instructions.      PICC Line Care  PICC stands for peripherally inserted central catheter. This is a short-term (temporary) tube that is used instead of a regular IV (intravenous) line.   Your PICC will need some care to keep it clean and working. This care includes:  · Changing the bandage (dressing)  · Flushing the catheter with fluids  · Changing the cap on the end of the catheter  A nurse or other healthcare provider will teach you how to do each of these things.  Home care  The following are general care guidelines that will help you care for your PICC line at home:  · You can use your arm. But avoid any activity that causes mild pain.  · Do not pick at it or pull on the tubing.  · Dont lift anything heavier than 10 pounds with the arm on the side of the PICC line.  · When you bathe or shower, tape plastic wrap over the site to keep it dry.  · Do not put the PICC site under water. No swimming or hot tubs. If  the dressing gets wet, change it right away.  · Always wash your hands before and after touching any part of your PICC.  · Do not allow the tubing to hang freely. Make sure to keep the tubing covered and secured to your arm to prevent the PICC line from being pulled out by accident.  The following tips will help you with dressing changes:  · Change the dressing over the site as directed. This is usually once a week. Change it sooner if the dressing gets wet or soiled. Check the dressing daily.  · You or a family member may be able to do the dressing change at home. Or you may be instructed to return to the office or clinic for dressing changes.  · Sterile technique must be used for PICC dressing change. If your dressing is changed at home, be sure you or your family member knows sterile dressing technique. Call your health care provider for instructions if you need them.  Follow-up care  Follow up as advised by your healthcare provider or our staff.  When to seek medical advice  Call your healthcare provider right away if any of these occur:  · Fever of 100.4°F (38°C) or higher  · Drainage from the PICC site  · Swelling or bulging around the PICC site  · Bleeding from the PICC site  · Skin pulls away from the PICC site  · Redness, warmth, or pus at the PICC site  · Tubing breaks, splits, or leaks  · More exposed tubing (tubing seems longer) or the tubing is pulled out completely  · Medicine or fluids do not drain from the bag into your PICC  Date Last Reviewed: 7/1/2016  © 2175-3692 [employer]. [address] [place] [address], [place] [address]. All rights reserved. This information is not intended as a substitute for professional medical care. Always follow your healthcare professional's instructions.

## 2019-05-07 NOTE — H&P
Radiology History & Physical      SUBJECTIVE:     Chief Complaint: MAC    History of Present Illness:  Marly Maciel is a 73 y.o. female who presents for central venous catheter placement  Past Medical History:   Diagnosis Date    Bronchiectasis without complication 2011    recent CT report 7/2018 from specialists Dr. Brayden Marks in [place] recieved - sputum AFB+ M. abscessus and sputum cx + pseudomonas and stenotrophamonas    Hashimoto's thyroiditis     Hyperlipidemia     Hyperparathyroidism     Hypertension     Internal hemorrhoids     MAC (mycobacterium avium-intracellulare complex) 2009    Osteopenia      Past Surgical History:   Procedure Laterality Date    APPENDECTOMY  1960    CHOLECYSTECTOMY  1997    [facility]    ESOPHAGOGASTRODUODENOSCOPY  09/04/2018    [facility] - bx with gastric mucosa with mild chronic inflammation, neg for h/pylori    partial parathyroidectomy Right     THYROIDECTOMY, PARTIAL  4/8/2014    TONSILLECTOMY, ADENOIDECTOMY  1963    TOTAL ABDOMINAL HYSTERECTOMY  1990       Home Meds:   Prior to Admission medications    Medication Sig Start Date End Date Taking? Authorizing Provider   atorvastatin (LIPITOR) 10 MG tablet TAKE 1 TABLET BY MOUTH EVERY DAY 9/18/18  Yes Stacy Pereira MD   azithromycin (Z-HARSHAD) 250 MG tablet Take 250 mg by mouth 3 (three) times a week.   Yes Historical Provider, MD   biotin 5,000 mcg TbDL Take 1 tablet by mouth once daily.   Yes Historical Provider, MD   calcium phosphate trib/vit D3 (CALTRATE GUMMY BITES ORAL) Take 2 tablets by mouth once daily.   Yes Historical Provider, MD   losartan (COZAAR) 50 MG tablet Take 0.5 tablets (25 mg total) by mouth once daily. 3/18/19 3/17/20 Yes Stacy Pereira MD   mv-min/iron/folic/calcium/vitK (WOMEN'S MULTIVITAMIN ORAL) Take 1 tablet by mouth once daily.   Yes Historical Provider, MD   promethazine-codeine 6.25-10 mg/5 ml (PHENERGAN WITH CODEINE) 6.25-10 mg/5 mL syrup TAKE 5  ML BY MOUTH EVERY 6 HOURS AS NEEDED COUGH. DO NOT EXCEED 30 ML A DAY 2/1/19  Yes Stacy Pereira MD   azithromycin (Z-HARSHAD) 250 MG tablet Take 1 tablet (250 mg total) by mouth 3 (three) times a week. 11/20/15 5/7/19  Stacy Pereira MD   estrogens, conjugated, (PREMARIN) 0.625 MG tablet Take 0.625 mg by mouth twice a week.    5/7/19  Historical Provider, MD     Anticoagulants/Antiplatelets: no anticoagulation    Allergies:   Review of patient's allergies indicates:   Allergen Reactions    Adhesive Rash     Tape & bandaids    Albuterol Other (See Comments)     faint    Ciprofloxacin Other (See Comments)     Central nervous system issues     Sedation History:  no adverse reactions            OBJECTIVE:     Vital Signs (Most Recent)  Temp: 98.1 °F (36.7 °C) (05/07/19 0950)  Pulse: 71 (05/07/19 0950)  Resp: 18 (05/07/19 0950)  BP: 134/76 (05/07/19 0950)  SpO2: 98 % (05/07/19 0950)    Physical Exam:  ASA: 2  Mallampati: 2    General: no acute distress  Mental Status: alert and oriented to person, place and time  HEENT: normocephalic, atraumatic  Chest: unlabored breathing  Heart: regular heart rate  Abdomen: nondistended  Extremity: moves all extremities    Laboratory  No results found for: INR    Lab Results   Component Value Date    WBC 4.36 07/09/2018    HGB 14.1 07/09/2018    HCT 43.3 07/09/2018    MCV 96 07/09/2018     07/09/2018      Lab Results   Component Value Date    GLU 92 07/09/2018     07/09/2018    K 4.8 07/09/2018     07/09/2018    CO2 30 (H) 07/09/2018    BUN 14 07/09/2018    CREATININE 0.7 07/09/2018    CALCIUM 9.6 07/09/2018    MG 2.2 04/06/2010    ALT 16 07/09/2018    AST 25 07/09/2018    ALBUMIN 3.9 07/09/2018    BILITOT 0.8 07/09/2018    BILIDIR 0.3 05/18/2012       ASSESSMENT/PLAN:     Sedation Plan: Moderate  Patient will undergo Central venous catheter placement for log term antibiotics. PICC team was unable to place peripheral PICC. Patient consent for peripheral  picc and tunneled line if necessary.    Kendrick Mccollum MD  Department of Radiology  Pager: [phone]

## 2019-05-07 NOTE — PROCEDURES
Radiology Post-Procedure Note    Pre Op Diagnosis: MAC    Post Op Diagnosis: Same    Procedure: PICC placement    Procedure performed by: Kendrick Mccollum MD    Written Informed Consent Obtained: Yes    Specimen Removed: No    Estimated Blood Loss: Minimal    Findings:   Using realtime U/S guidance a 5 Fr PICC catheter was placed into the left Basilic Vein with tip of the catheter in the SVC.    PICC is ready for use.     Kendrick Mccollum MD  Department of Radiology  Pager: [phone]

## 2019-06-03 ENCOUNTER — HOSPITAL ENCOUNTER (EMERGENCY)
Facility: OTHER | Age: 74
Discharge: HOME | End: 2019-06-03
Attending: EMERGENCY MEDICINE
Payer: MEDICARE

## 2019-06-03 VITALS
OXYGEN SATURATION: 97 % | WEIGHT: 120 LBS | TEMPERATURE: 98 F | RESPIRATION RATE: 18 BRPM | SYSTOLIC BLOOD PRESSURE: 118 MMHG | BODY MASS INDEX: 19.99 KG/M2 | DIASTOLIC BLOOD PRESSURE: 58 MMHG | HEART RATE: 68 BPM | HEIGHT: 65 IN

## 2019-06-03 DIAGNOSIS — J47.1 BRONCHIECTASIS WITH ACUTE EXACERBATION: ICD-10-CM

## 2019-06-03 DIAGNOSIS — A31.0 MYCOBACTERIUM AVIUM COMPLEX: Primary | ICD-10-CM

## 2019-06-03 DIAGNOSIS — R06.02 SOB (SHORTNESS OF BREATH): ICD-10-CM

## 2019-06-03 LAB
ALBUMIN SERPL BCP-MCNC: 3.7 G/DL (ref 3.5–5.2)
ALP SERPL-CCNC: 89 U/L (ref 55–135)
ALT SERPL W/O P-5'-P-CCNC: 96 U/L (ref 10–44)
ANION GAP SERPL CALC-SCNC: 9 MMOL/L (ref 8–16)
AST SERPL-CCNC: 96 U/L (ref 10–40)
BASOPHILS # BLD AUTO: 0.03 K/UL (ref 0–0.2)
BASOPHILS NFR BLD: 0.6 % (ref 0–1.9)
BILIRUB SERPL-MCNC: 0.6 MG/DL (ref 0.1–1)
BILIRUB UR QL STRIP: NEGATIVE
BNP SERPL-MCNC: 16 PG/ML (ref 0–99)
BUN SERPL-MCNC: 13 MG/DL (ref 8–23)
CALCIUM SERPL-MCNC: 9.4 MG/DL (ref 8.7–10.5)
CHLORIDE SERPL-SCNC: 102 MMOL/L (ref 95–110)
CLARITY UR: CLEAR
CO2 SERPL-SCNC: 29 MMOL/L (ref 23–29)
COLOR UR: YELLOW
CREAT SERPL-MCNC: 0.8 MG/DL (ref 0.5–1.4)
DIFFERENTIAL METHOD: ABNORMAL
EOSINOPHIL # BLD AUTO: 0.3 K/UL (ref 0–0.5)
EOSINOPHIL NFR BLD: 6.5 % (ref 0–8)
ERYTHROCYTE [DISTWIDTH] IN BLOOD BY AUTOMATED COUNT: 13.2 % (ref 11.5–14.5)
EST. GFR  (AFRICAN AMERICAN): >60 ML/MIN/1.73 M^2
EST. GFR  (NON AFRICAN AMERICAN): >60 ML/MIN/1.73 M^2
GLUCOSE SERPL-MCNC: 97 MG/DL (ref 70–110)
GLUCOSE UR QL STRIP: NEGATIVE
HCT VFR BLD AUTO: 38 % (ref 37–48.5)
HGB BLD-MCNC: 12.9 G/DL (ref 12–16)
HGB UR QL STRIP: ABNORMAL
KETONES UR QL STRIP: NEGATIVE
LACTATE SERPL-SCNC: 1.8 MMOL/L (ref 0.5–2.2)
LEUKOCYTE ESTERASE UR QL STRIP: NEGATIVE
LYMPHOCYTES # BLD AUTO: 1.6 K/UL (ref 1–4.8)
LYMPHOCYTES NFR BLD: 33.1 % (ref 18–48)
MAGNESIUM SERPL-MCNC: 1.9 MG/DL (ref 1.6–2.6)
MCH RBC QN AUTO: 32.2 PG (ref 27–31)
MCHC RBC AUTO-ENTMCNC: 33.9 G/DL (ref 32–36)
MCV RBC AUTO: 95 FL (ref 82–98)
MONOCYTES # BLD AUTO: 0.4 K/UL (ref 0.3–1)
MONOCYTES NFR BLD: 7.9 % (ref 4–15)
NEUTROPHILS # BLD AUTO: 2.6 K/UL (ref 1.8–7.7)
NEUTROPHILS NFR BLD: 51.7 % (ref 38–73)
NITRITE UR QL STRIP: NEGATIVE
PH UR STRIP: 6 [PH] (ref 5–8)
PLATELET # BLD AUTO: 111 K/UL (ref 150–350)
PMV BLD AUTO: 9.9 FL (ref 9.2–12.9)
POTASSIUM SERPL-SCNC: 3.9 MMOL/L (ref 3.5–5.1)
PROCALCITONIN SERPL IA-MCNC: 0.1 NG/ML
PROT SERPL-MCNC: 6.6 G/DL (ref 6–8.4)
PROT UR QL STRIP: NEGATIVE
RBC # BLD AUTO: 4.01 M/UL (ref 4–5.4)
SODIUM SERPL-SCNC: 140 MMOL/L (ref 136–145)
SP GR UR STRIP: <=1.005 (ref 1–1.03)
URN SPEC COLLECT METH UR: ABNORMAL
UROBILINOGEN UR STRIP-ACNC: NEGATIVE EU/DL
WBC # BLD AUTO: 4.95 K/UL (ref 3.9–12.7)

## 2019-06-03 PROCEDURE — 84145 PROCALCITONIN (PCT): CPT

## 2019-06-03 PROCEDURE — 93010 EKG 12-LEAD: ICD-10-PCS | Mod: ,,, | Performed by: INTERNAL MEDICINE

## 2019-06-03 PROCEDURE — 83880 ASSAY OF NATRIURETIC PEPTIDE: CPT

## 2019-06-03 PROCEDURE — 99284 EMERGENCY DEPT VISIT MOD MDM: CPT | Mod: 25

## 2019-06-03 PROCEDURE — 87103 BLOOD FUNGUS CULTURE: CPT

## 2019-06-03 PROCEDURE — 81003 URINALYSIS AUTO W/O SCOPE: CPT

## 2019-06-03 PROCEDURE — 83735 ASSAY OF MAGNESIUM: CPT

## 2019-06-03 PROCEDURE — 87205 SMEAR GRAM STAIN: CPT

## 2019-06-03 PROCEDURE — 93010 ELECTROCARDIOGRAM REPORT: CPT | Mod: ,,, | Performed by: INTERNAL MEDICINE

## 2019-06-03 PROCEDURE — 93005 ELECTROCARDIOGRAM TRACING: CPT

## 2019-06-03 PROCEDURE — 25500020 PHARM REV CODE 255: Performed by: EMERGENCY MEDICINE

## 2019-06-03 PROCEDURE — 83605 ASSAY OF LACTIC ACID: CPT

## 2019-06-03 PROCEDURE — 80053 COMPREHEN METABOLIC PANEL: CPT

## 2019-06-03 PROCEDURE — 85025 COMPLETE CBC W/AUTO DIFF WBC: CPT

## 2019-06-03 PROCEDURE — 25000003 PHARM REV CODE 250: Performed by: EMERGENCY MEDICINE

## 2019-06-03 PROCEDURE — 87070 CULTURE OTHR SPECIMN AEROBIC: CPT

## 2019-06-03 RX ADMIN — SODIUM CHLORIDE 1632 ML: 0.9 INJECTION, SOLUTION INTRAVENOUS at 12:06

## 2019-06-03 RX ADMIN — IOHEXOL 75 ML: 350 INJECTION, SOLUTION INTRAVENOUS at 02:06

## 2019-06-03 NOTE — ED PROVIDER NOTES
Encounter Date: 6/3/2019    SCRIBE #1 NOTE: I, Aiden New, am scribing for, and in the presence of, Dr. Sheffield .       History     Chief Complaint   Patient presents with    Shortness of Breath     PICC line with IV abx, fever and chills this weekend. sudden onset of severe SOB on saturday, sent by primary for PE r/o     Time seen by provider: 12:04 PM    This is a 73 y.o. female, with history of bronchiectasis, MAC, HTN, and Hashimoto's thyroiditis, who presents with complaint of sudden SOB for two days. Patient states the symptom is exacerbated with exertion and notes that she can only walk five feet before feeling short of breath. She reports  light-headedness with standing. She reports chills and body aches, worst in the legs over the weekend while she was receiving her infusion. She states the pain resolved an hour after infusion. Patient states she has a PICC line in place to receive IV antibiotics for recurrent microbacterium infection. She has been receiving the antibiotics BID for the last three and half weeks. Patient states she called her pulmonologist to inform him of her symptoms and she was advised to stop use of medications. She presented to the infusion clinic today to have her dressings changed around the PICC line and to complete blood cultures. She informed her physician of how she felt and she was advised to present to the ED to rule out PE. Patient states she has no appetite, but she has been gaining weight. She reports cough at baseline, but notes it has been improving with the antibiotics. She denies fevers, headaches, congestion, rhinorrhea, sore throat, chest pain, chest tightness, abdominal pain, N/V/D, dysuria, urinary frequency, or urinary urgency.     The history is provided by the patient ( at bedside).     Review of patient's allergies indicates:   Allergen Reactions    Adhesive Rash     Tape & bandaids    Albuterol Other (See Comments)     faint    Ciprofloxacin Other (See  Comments)     Central nervous system issues     Past Medical History:   Diagnosis Date    Bronchiectasis without complication 2011    recent CT report 7/2018 from specialists Dr. Brayden Marks in [place] recieved - sputum AFB+ M. abscessus and sputum cx + pseudomonas and stenotrophamonas    Hashimoto's thyroiditis     Hyperlipidemia     Hyperparathyroidism     Hypertension     Internal hemorrhoids     MAC (mycobacterium avium-intracellulare complex) 2009    Osteopenia      Past Surgical History:   Procedure Laterality Date    APPENDECTOMY  1960    CHOLECYSTECTOMY  1997    [facility]    ESOPHAGOGASTRODUODENOSCOPY  09/04/2018    [facility] - bx with gastric mucosa with mild chronic inflammation, neg for h/pylori    Insertion-picc  5/7/2019    Performed by [facility] at [facility] C[facility] LAB    partial parathyroidectomy Right     THYROIDECTOMY, PARTIAL  4/8/2014    TONSILLECTOMY, ADENOIDECTOMY  1963    TOTAL ABDOMINAL HYSTERECTOMY  1990     Family History   Problem Relation Age of Onset    Stroke Mother 86    Heart attack Father 67     Social History     Tobacco Use    Smoking status: Never Smoker    Smokeless tobacco: Never Used   Substance Use Topics    Alcohol use: Yes     Alcohol/week: 0.6 oz     Types: 1 Glasses of wine per week     Comment: week    Drug use: No     Review of Systems   Constitutional: Positive for appetite change (decreased) and chills. Negative for fever.   HENT: Negative for congestion, rhinorrhea and sore throat.    Respiratory: Positive for cough (chronic) and shortness of breath. Negative for chest tightness.    Cardiovascular: Negative for chest pain.   Gastrointestinal: Negative for abdominal pain, diarrhea, nausea and vomiting.   Genitourinary: Negative for dysuria, frequency and urgency.   Musculoskeletal: Negative for back pain.   Skin: Negative for rash.   Neurological: Negative for dizziness and headaches.   Psychiatric/Behavioral: Negative for  confusion.       Physical Exam     Initial Vitals [06/03/19 1131]   BP Pulse Resp Temp SpO2   (!) 173/83 86 18 98 °F (36.7 °C) 99 %      MAP       --         Physical Exam    Nursing note and vitals reviewed.  Constitutional: She appears well-developed and well-nourished. No distress.   HENT:   Head: Normocephalic and atraumatic.   Mouth/Throat: Oropharynx is clear and moist.   Eyes: Conjunctivae and EOM are normal.   Neck: Normal range of motion. Neck supple.   Cardiovascular: Normal rate, regular rhythm, normal heart sounds and intact distal pulses.   Pulmonary/Chest: No respiratory distress. She has wheezes. She has no rhonchi. She has no rales.   Expiratory wheeze heard with cough.    Abdominal: Soft. She exhibits no distension. There is no tenderness.   Musculoskeletal: Normal range of motion. She exhibits edema.   Trace edema to bilateral legs. PICC line in place in E.    Neurological: She is alert and oriented to person, place, and time. She has normal strength.   Skin: Skin is warm and dry.   Psychiatric: She has a normal mood and affect. Her behavior is normal. Judgment and thought content normal.         ED Course   Procedures  Labs Reviewed   CBC W/ AUTO DIFFERENTIAL - Abnormal; Notable for the following components:       Result Value    Mean Corpuscular Hemoglobin 32.2 (*)     Platelets 111 (*)     All other components within normal limits   COMPREHENSIVE METABOLIC PANEL - Abnormal; Notable for the following components:    AST 96 (*)     ALT 96 (*)     All other components within normal limits   URINALYSIS, REFLEX TO URINE CULTURE - Abnormal; Notable for the following components:    Specific Gravity, UA <=1.005 (*)     Occult Blood UA Trace (*)     All other components within normal limits    Narrative:     Preferred Collection Type->Urine, Clean Catch   FUNGUS CULTURE, BLOOD OR BONE MARROW   CULTURE, RESPIRATORY   LACTIC ACID, PLASMA   PROCALCITONIN   B-TYPE NATRIURETIC PEPTIDE   MAGNESIUM     EKG  Readings: (Independently Interpreted)   Initial Reading: No STEMI. Ectopy: No Ectopy. Conduction: Normal.   NSR at a rate of 73.        Imaging Results          CTA Chest Non-Coronary (PE Study) (Final result)  Result time 06/03/19 14:29:14    Final result by Marv Pearl MD (06/03/19 14:29:14)                 Impression:      Scattered airspace okay opacities with small cavitary nodules and bronchiectasis at the bilateral lung bases.  Findings appear slightly progressed when compared to 2017 exam.  RUPA infection is favored.    No evidence of a pulmonary embolus.      Electronically signed by: Marv Pearl MD  Date:    06/03/2019  Time:    14:29             Narrative:    EXAMINATION:  CTA CHEST NON CORONARY    CLINICAL HISTORY:  Chest pain, acute, PE suspected, high pretest prob;    TECHNIQUE:  Low dose axial images, sagittal and coronal reformations were obtained from the thoracic inlet to the lung bases following the IV administration of 100 mL of Omnipaque 350.  Contrast timing was optimized to evaluate the pulmonary arteries.  MIP images were performed.    COMPARISON:  11/21/2017    FINDINGS:  Visualized structures at the base of the neck show no concerning masses or lymph nodes.    Heart is relatively normal in size.  The pulmonary arteries distribute evenly.  There is no evidence of a pulmonary embolus.    Scattered airspace consolidation and cavitary lesions at the bilateral lung base as well as bronchiectasis most notable at the lung bases.  Findings appear slightly progressed when compared to 2017 exam.  For reference, the largest cavitary lesion in the posterior right upper lobe measures roughly 2.0 cm.    There is no mediastinal or hilar adenopathy.  No evidence of a pleural effusion.  Airways are patent without endoluminal lesion.    Visualized abdominal contents show no acute findings.                                 Medical Decision Making:   Initial Assessment:   73-year-old female with a  history of bronchiectasis a micro bacterium infection lungs, prior Pseudomonas cultured from sputum.  The patient follows closely with the local pulmonologist well as pulmonologist in [place].  She has been treated with cefoxitin and amikacin for the past several weeks in order to try to eradicate some of the bacterial burden from her lungs.  The patient experienced acute onset of shortness of breath while receiving her amikacin infusion over the weekend.  It has been 3 days since her last dose.  The patient is concerned that she may have a pulmonary embolus.  She is not hypoxic or tachycardic.  Initially she is hypertensive, which is out of character for the patient.  She also reports some fever and chills.  She had blood cultures done today.  Patient's son is a cardiologist and recommended that she come to the hospital to have a PE study.  The patient has close follow-up and has an appointment on the 6th of this month with Dr. Rosales.     Differential Diagnosis:   Differential diagnosis includes, but is not limited to:  pulmonary infectious process, allergic rhinitis, postnasal drip, allergic bronchitis, sinusitis, infectious bronchitis, COPD, asthma, pulmonary embolus, pleural effusion, myocardial ischemia, cardiac valvulopathy, and congestive heart failure.    Independently Interpreted Test(s):   I have ordered and independently interpreted EKG Reading(s) - see prior notes  Clinical Tests:   Lab Tests: Ordered and Reviewed  Radiological Study: Ordered and Reviewed  Medical Tests: Ordered and Reviewed  ED Management:  This patient was discussed with Dr. Boss at shift change including presentation, testing thus far and pending testing and treatment which includes CTA chest.  Anticipated disposition is home.              Scribe Attestation:   Scribe #1: I performed the above scribed service and the documentation accurately describes the services I performed. I attest to the accuracy of the note.    Attending  Attestation:           Physician Attestation for Scribe:  Physician Attestation Statement for Scribe #1: I, Dr. Sheffield, reviewed documentation, as scribed by Aiden New  in my presence, and it is both accurate and complete.                    Clinical Impression:     1. Mycobacterium avium complex    2. SOB (shortness of breath)    3. Bronchiectasis with acute exacerbation            Disposition:   Disposition: Discharged  Condition: Stable                        Beverly Sheffield MD  06/08/19 0865

## 2019-06-03 NOTE — ED TRIAGE NOTES
Pt presents to the ED with c/o SOB, cough and dizziness x 2 days. Pt states that she spoke with her son that a cardiologists and a he referred her to the ED to rule out PE. Pt has a hx of respiratory diseases. Pt in NAD. Pt breathing is even and unlabored. Pt denies pains.

## 2019-06-05 LAB
BACTERIA SPEC AEROBE CULT: NORMAL
BACTERIA SPEC AEROBE CULT: NORMAL
GRAM STN SPEC: NORMAL

## 2019-07-03 LAB — FUNGUS BLD CULT: NORMAL

## 2019-08-05 ENCOUNTER — PATIENT MESSAGE (OUTPATIENT)
Dept: INTERNAL MEDICINE | Facility: CLINIC | Age: 74
End: 2019-08-05

## 2019-08-09 ENCOUNTER — PATIENT MESSAGE (OUTPATIENT)
Dept: INTERNAL MEDICINE | Facility: CLINIC | Age: 74
End: 2019-08-09

## 2019-08-14 ENCOUNTER — PATIENT MESSAGE (OUTPATIENT)
Dept: INTERNAL MEDICINE | Facility: CLINIC | Age: 74
End: 2019-08-14

## 2019-08-14 NOTE — TELEPHONE ENCOUNTER
Ms Raheem Pereira want you to see her in 2-3 weeks. I have your schedule the 1st available 8/30/19 at 820 am. If you cannot make this appt let us know and we will reschedule

## 2019-08-16 ENCOUNTER — PATIENT OUTREACH (OUTPATIENT)
Dept: ADMINISTRATIVE | Facility: HOSPITAL | Age: 74
End: 2019-08-16

## 2019-08-16 PROBLEM — A31.8 MYCOBACTERIUM ABSCESSUS INFECTION: Status: ACTIVE | Noted: 2019-05-01

## 2019-08-16 PROBLEM — D24.9 INTRADUCTAL PAPILLOMA OF BREAST: Status: ACTIVE | Noted: 2018-12-10

## 2019-08-16 PROBLEM — J47.9: Status: ACTIVE | Noted: 2019-05-01

## 2019-08-30 ENCOUNTER — HOSPITAL ENCOUNTER (OUTPATIENT)
Dept: RADIOLOGY | Facility: OTHER | Age: 74
Discharge: HOME | End: 2019-08-30
Attending: INTERNAL MEDICINE
Payer: MEDICARE

## 2019-08-30 ENCOUNTER — OFFICE VISIT (OUTPATIENT)
Dept: INTERNAL MEDICINE | Facility: CLINIC | Age: 74
End: 2019-08-30
Attending: INTERNAL MEDICINE
Payer: MEDICARE

## 2019-08-30 VITALS
SYSTOLIC BLOOD PRESSURE: 124 MMHG | WEIGHT: 125.88 LBS | OXYGEN SATURATION: 98 % | DIASTOLIC BLOOD PRESSURE: 74 MMHG | HEART RATE: 73 BPM | HEIGHT: 65 IN | BODY MASS INDEX: 20.97 KG/M2

## 2019-08-30 DIAGNOSIS — R22.9 SUBCUTANEOUS NODULE: ICD-10-CM

## 2019-08-30 DIAGNOSIS — E55.9 VITAMIN D DEFICIENCY DISEASE: ICD-10-CM

## 2019-08-30 DIAGNOSIS — I10 ESSENTIAL HYPERTENSION: Primary | ICD-10-CM

## 2019-08-30 DIAGNOSIS — E06.3 HASHIMOTO'S THYROIDITIS: ICD-10-CM

## 2019-08-30 DIAGNOSIS — M85.80 OSTEOPENIA, UNSPECIFIED LOCATION: ICD-10-CM

## 2019-08-30 DIAGNOSIS — A31.0 MYCOBACTERIAL DISEASE, PULMONARY: ICD-10-CM

## 2019-08-30 DIAGNOSIS — E78.5 HYPERLIPIDEMIA, UNSPECIFIED HYPERLIPIDEMIA TYPE: ICD-10-CM

## 2019-08-30 PROCEDURE — 76705 US SOFT TISSUE MISC: ICD-10-PCS | Mod: 26,,, | Performed by: INTERNAL MEDICINE

## 2019-08-30 PROCEDURE — 76999 ECHO EXAMINATION PROCEDURE: CPT | Mod: TC

## 2019-08-30 PROCEDURE — 99214 OFFICE O/P EST MOD 30 MIN: CPT | Mod: S$PBB,,, | Performed by: INTERNAL MEDICINE

## 2019-08-30 PROCEDURE — 99999 PR PBB SHADOW E&M-EST. PATIENT-LVL III: CPT | Mod: PBBFAC,,, | Performed by: INTERNAL MEDICINE

## 2019-08-30 PROCEDURE — 99999 PR PBB SHADOW E&M-EST. PATIENT-LVL III: ICD-10-PCS | Mod: PBBFAC,,, | Performed by: INTERNAL MEDICINE

## 2019-08-30 PROCEDURE — 99214 PR OFFICE/OUTPT VISIT, EST, LEVL IV, 30-39 MIN: ICD-10-PCS | Mod: S$PBB,,, | Performed by: INTERNAL MEDICINE

## 2019-08-30 PROCEDURE — 99213 OFFICE O/P EST LOW 20 MIN: CPT | Mod: PBBFAC,25 | Performed by: INTERNAL MEDICINE

## 2019-08-30 PROCEDURE — 76705 ECHO EXAM OF ABDOMEN: CPT | Mod: 26,,, | Performed by: INTERNAL MEDICINE

## 2019-08-30 RX ORDER — ATORVASTATIN CALCIUM 10 MG/1
TABLET, FILM COATED ORAL
Qty: 90 TABLET | Refills: 3 | Status: SHIPPED | OUTPATIENT
Start: 2019-08-30 | End: 2020-08-20

## 2019-08-30 RX ORDER — LOSARTAN POTASSIUM 50 MG/1
25 TABLET ORAL DAILY
Qty: 45 TABLET | Refills: 3 | Status: SHIPPED | OUTPATIENT
Start: 2019-08-30 | End: 2020-02-11

## 2019-08-30 RX ORDER — PROMETHAZINE HYDROCHLORIDE 12.5 MG/1
12.5 TABLET ORAL EVERY 6 HOURS PRN
Qty: 30 TABLET | Refills: 1 | Status: SHIPPED | OUTPATIENT
Start: 2019-08-30 | End: 2019-09-10 | Stop reason: SDUPTHER

## 2019-08-30 NOTE — PROGRESS NOTES
"Subjective:   Patient ID: Marly Maciel is a 73 y.o. female  Chief complaint:   Chief Complaint   Patient presents with    Follow-up     CT scan       HPI  Pt here for f/u     Hx of pulm MAC   - Her pulm specialist in [place] Dr. Destiney dunn retired - she is now seeing his partner and followed by pulm MAC specialist Dr. Taylor at [facility] - undergoing IV therapy for this     Recent CT scan improved for first time in 9 years per pt!  Completed 4/6 months of IV abx through MAC specialists [facility] -   PICC line recently replaced     CT scan  7/24/19:   Report provided by pt and reviewed   Brought records: EKG with NSR     taking zofran for nasuea 2/2 to abx but causing constipation which has been difficult to manage   - would like to try phenergan to only take prn     thrush which has occurred every 6 weeks since starting IV abx - treated successfully wth nystatin and diflucan  CT showed .9cm nodule in epigastric subcut region - here today for f/u of this    HLD: sunshine lipitor    HTN: controlled on losartan    Hypothyroidism:   Hx of hashimotos and hyperparathyroidism dx 2/2 hyperCa diagnosed and treated with surgery 4/8/2014 at [facility] and s/p parathyroidectomy and partial thyroidectomy     Has hx of bronchiectasis and pulm MAC and pulm pseudomonas colonization followed by pulm/ID specialists in [place] - Dr. Brayden Marks in [place], [place] Taking azithromycin 250mg daily at baseline  - taken off of asa due to intermittent hemoptysis while on this - none since stopping asa Nov 2017     MMG performed at [facility] - due in Sept - ordered by Dr. Reyes - gyn     Osteopenia: taking tyson and MVI      Review of Systems    Objective:  Vitals:    08/30/19 0811   BP: 124/74   BP Location: Left arm   Patient Position: Sitting   BP Method: Medium (Manual)   Pulse: 73   SpO2: 98%   Weight: 57.1 kg (125 lb 14.1 oz)   Height: 5' 5" (1.651 m)     Body mass index is 20.95 kg/m².    Physical Exam   Constitutional: She is oriented to " person, place, and time. She appears well-developed and well-nourished.   HENT:   Head: Normocephalic and atraumatic.   Right Ear: External ear normal.   Left Ear: External ear normal.   Nose: Nose normal.   Mouth/Throat: Oropharynx is clear and moist. No oropharyngeal exudate.   Eyes: Conjunctivae and EOM are normal.   Neck: Neck supple. No thyromegaly present.   Cardiovascular: Normal rate, regular rhythm and intact distal pulses.   Pulmonary/Chest: Effort normal and breath sounds normal.   Abdominal: Soft. Bowel sounds are normal.   Musculoskeletal: She exhibits no edema or tenderness.   Lymphadenopathy:     She has no cervical adenopathy.   Neurological: She is alert and oriented to person, place, and time.   Skin: Skin is warm and dry.   Psychiatric: Her behavior is normal. Thought content normal.   Vitals reviewed.      Assessment:  1. Essential hypertension    2. Subcutaneous nodule    3. Vitamin D deficiency disease    4. Osteopenia, unspecified location    5. Hyperlipidemia, unspecified hyperlipidemia type    6. Hashimoto's thyroiditis    7. Mycobacterial disease, pulmonary        Plan:  Marly was seen today for follow-up.    Diagnoses and all orders for this visit:    Essential hypertension  Controlled, cont med     Subcutaneous nodule  -     US Soft Tissue Misc; Future    Vitamin D deficiency disease  Cont suppl    Osteopenia, unspecified location  Cont mvi and vit d    Hyperlipidemia, unspecified hyperlipidemia type  Stable, cont statin     Hashimoto's thyroiditis  tsh wnl     Other orders  -     promethazine (PHENERGAN) 12.5 MG Tab; Take 1 tablet (12.5 mg total) by mouth every 6 (six) hours as needed.  -     atorvastatin (LIPITOR) 10 MG tablet; TAKE 1 TABLET BY MOUTH EVERY DAY  -     losartan (COZAAR) 50 MG tablet; Take 0.5 tablets (25 mg total) by mouth once daily.    Keep appt with specialists  shingrix through pharmacy   Flu vaccine     Health Maintenance   Topic Date Due    Pneumococcal Vaccine  (65+ Low/Medium Risk) (2 of 2 - PPSV23) 08/30/2017    DEXA SCAN  04/10/2020    Mammogram  07/22/2020    Lipid Panel  07/09/2023    TETANUS VACCINE  06/14/2026    Colonoscopy  09/04/2028    Hepatitis C Screening  Completed

## 2019-09-05 ENCOUNTER — PATIENT MESSAGE (OUTPATIENT)
Dept: INTERNAL MEDICINE | Facility: CLINIC | Age: 74
End: 2019-09-05

## 2019-09-10 RX ORDER — PROMETHAZINE HYDROCHLORIDE 12.5 MG/1
12.5 TABLET ORAL EVERY 6 HOURS PRN
Qty: 30 TABLET | Refills: 1 | Status: SHIPPED | OUTPATIENT
Start: 2019-09-10 | End: 2019-11-01 | Stop reason: SDUPTHER

## 2019-10-14 ENCOUNTER — TELEPHONE (OUTPATIENT)
Dept: INTERNAL MEDICINE | Facility: CLINIC | Age: 74
End: 2019-10-14

## 2019-10-14 DIAGNOSIS — R00.2 PALPITATIONS: ICD-10-CM

## 2019-10-14 DIAGNOSIS — R53.83 FATIGUE, UNSPECIFIED TYPE: ICD-10-CM

## 2019-10-14 DIAGNOSIS — R00.0 TACHYCARDIA: Primary | ICD-10-CM

## 2019-10-14 NOTE — TELEPHONE ENCOUNTER
Started on clofazimine. Also was taking azithromycin. No longer taking currently due to side effects. Having shortness of breath with tachycardia. Denies CP/ tightness, swelling in ankles. Her son is a cardiologist and recommended an EKG and US heart.  Discussed emergency prompts with patient. Patient verbalized understanding and had no further questions or concerns.

## 2019-10-14 NOTE — TELEPHONE ENCOUNTER
----- Message from Lilia Ramirez sent at 10/14/2019 11:29 AM CDT -----  Contact: Self   Type: Patient Call Back    What is the request in detail: Pt calling to speak to a nurse regarding ordering EKG.       Can the clinic reply by [facility]? No    Would the patient rather a call back or a response via [facility]? Call back     Best call back number: [phone]

## 2019-10-15 NOTE — TELEPHONE ENCOUNTER
Echo and ekg ordered - rec pt keep appt with provider today however per conversation with staff she declined - did agree to f/u with cards - orders signed - please arrange

## 2019-10-15 NOTE — TELEPHONE ENCOUNTER
----- Message from Ben Munoz sent at 10/15/2019  9:30 AM CDT -----  Contact: Pt   Name of Who is Calling: ALAN CORTEZ [[id number]]    What is the request in detail: Patient is requesting a call back regards to she is feeling a lot better but doesn't need to see anyone but still needs the EKG........  Please contact to further discuss and advise      Can the clinic reply by [facility]: Yes     What [facility] to [facility]:  [phone]

## 2019-10-16 ENCOUNTER — HOSPITAL ENCOUNTER (OUTPATIENT)
Dept: CARDIOLOGY | Facility: OTHER | Age: 74
Discharge: HOME | End: 2019-10-16
Attending: INTERNAL MEDICINE
Payer: MEDICARE

## 2019-10-16 VITALS
HEIGHT: 65 IN | BODY MASS INDEX: 20.83 KG/M2 | WEIGHT: 125 LBS | SYSTOLIC BLOOD PRESSURE: 124 MMHG | DIASTOLIC BLOOD PRESSURE: 74 MMHG

## 2019-10-16 DIAGNOSIS — R53.83 FATIGUE, UNSPECIFIED TYPE: ICD-10-CM

## 2019-10-16 DIAGNOSIS — R00.0 TACHYCARDIA: ICD-10-CM

## 2019-10-16 DIAGNOSIS — R00.2 PALPITATIONS: ICD-10-CM

## 2019-10-16 LAB
ASCENDING AORTA: 2.71 CM
AV INDEX (PROSTH): 0.85
AV MEAN GRADIENT: 3 MMHG
AV PEAK GRADIENT: 4 MMHG
AV VALVE AREA: 3.28 CM2
AV VELOCITY RATIO: 0.84
BSA FOR ECHO PROCEDURE: 1.61 M2
CV ECHO LV RWT: 0.39 CM
DOP CALC AO PEAK VEL: 0.96 M/S
DOP CALC AO VTI: 23.36 CM
DOP CALC LVOT AREA: 3.9 CM2
DOP CALC LVOT DIAMETER: 2.22 CM
DOP CALC LVOT PEAK VEL: 0.81 M/S
DOP CALC LVOT STROKE VOLUME: 76.68 CM3
DOP CALCLVOT PEAK VEL VTI: 19.82 CM
E WAVE DECELERATION TIME: 356.38 MSEC
E/A RATIO: 0.77
E/E' RATIO: 6.86 M/S
ECHO LV POSTERIOR WALL: 0.76 CM (ref 0.6–1.1)
FRACTIONAL SHORTENING: 32 % (ref 28–44)
INTERVENTRICULAR SEPTUM: 0.77 CM (ref 0.6–1.1)
IVRT: 0.14 MSEC
LA MAJOR: 2.62 CM
LA WIDTH: 3.12 CM
LEFT ATRIUM SIZE: 3.48 CM
LEFT INTERNAL DIMENSION IN SYSTOLE: 2.61 CM (ref 2.1–4)
LEFT VENTRICLE DIASTOLIC VOLUME INDEX: 39.79 ML/M2
LEFT VENTRICLE DIASTOLIC VOLUME: 64.46 ML
LEFT VENTRICLE MASS INDEX: 51 G/M2
LEFT VENTRICLE SYSTOLIC VOLUME INDEX: 15.4 ML/M2
LEFT VENTRICLE SYSTOLIC VOLUME: 24.88 ML
LEFT VENTRICULAR INTERNAL DIMENSION IN DIASTOLE: 3.86 CM (ref 3.5–6)
LEFT VENTRICULAR MASS: 83.03 G
LV LATERAL E/E' RATIO: 8 M/S
LV SEPTAL E/E' RATIO: 6 M/S
MV PEAK A VEL: 0.62 M/S
MV PEAK E VEL: 0.48 M/S
MV STENOSIS PRESSURE HALF TIME: 103 MS
MV VALVE AREA P 1/2 METHOD: 2.14 CM2
PISA TR MAX VEL: 2.23 M/S
PV PEAK VELOCITY: 0.74 CM/S
RA MAJOR: 3.05 CM
RA WIDTH: 2.69 CM
RIGHT VENTRICULAR END-DIASTOLIC DIMENSION: 2.62 CM
SINUS: 3.07 CM
STJ: 2.75 CM
TDI LATERAL: 0.06 M/S
TDI SEPTAL: 0.08 M/S
TDI: 0.07 M/S
TR MAX PG: 20 MMHG
TRICUSPID ANNULAR PLANE SYSTOLIC EXCURSION: 1.39 CM

## 2019-10-16 PROCEDURE — 93306 ECHO (CUPID ONLY): ICD-10-PCS | Mod: 26,,, | Performed by: INTERNAL MEDICINE

## 2019-10-16 PROCEDURE — 93306 TTE W/DOPPLER COMPLETE: CPT

## 2019-10-16 PROCEDURE — 93010 EKG 12-LEAD: ICD-10-PCS | Mod: ,,, | Performed by: INTERNAL MEDICINE

## 2019-10-16 PROCEDURE — 93306 TTE W/DOPPLER COMPLETE: CPT | Mod: 26,,, | Performed by: INTERNAL MEDICINE

## 2019-10-16 PROCEDURE — 93010 ELECTROCARDIOGRAM REPORT: CPT | Mod: ,,, | Performed by: INTERNAL MEDICINE

## 2019-10-16 PROCEDURE — 93005 ELECTROCARDIOGRAM TRACING: CPT

## 2019-10-21 ENCOUNTER — OFFICE VISIT (OUTPATIENT)
Dept: CARDIOLOGY | Facility: CLINIC | Age: 74
End: 2019-10-21
Attending: INTERNAL MEDICINE
Payer: MEDICARE

## 2019-10-21 VITALS
SYSTOLIC BLOOD PRESSURE: 160 MMHG | BODY MASS INDEX: 20.83 KG/M2 | DIASTOLIC BLOOD PRESSURE: 78 MMHG | HEIGHT: 65 IN | WEIGHT: 125 LBS | HEART RATE: 78 BPM

## 2019-10-21 DIAGNOSIS — I10 ESSENTIAL HYPERTENSION: ICD-10-CM

## 2019-10-21 DIAGNOSIS — A31.0 MYCOBACTERIAL DISEASE, PULMONARY: ICD-10-CM

## 2019-10-21 DIAGNOSIS — E06.3 HASHIMOTO'S THYROIDITIS: ICD-10-CM

## 2019-10-21 DIAGNOSIS — Z86.2 HISTORY OF SARCOIDOSIS: ICD-10-CM

## 2019-10-21 DIAGNOSIS — R06.02 SOB (SHORTNESS OF BREATH): Primary | ICD-10-CM

## 2019-10-21 DIAGNOSIS — R00.2 PALPITATIONS: ICD-10-CM

## 2019-10-21 DIAGNOSIS — I50.32 CHRONIC DIASTOLIC HEART FAILURE: ICD-10-CM

## 2019-10-21 DIAGNOSIS — R00.0 TACHYCARDIA: ICD-10-CM

## 2019-10-21 DIAGNOSIS — J47.9 BRONCHIECTASIS WITHOUT COMPLICATION: Primary | ICD-10-CM

## 2019-10-21 DIAGNOSIS — E21.0 PRIMARY HYPERPARATHYROIDISM: ICD-10-CM

## 2019-10-21 PROCEDURE — 99204 OFFICE O/P NEW MOD 45 MIN: CPT | Mod: S$GLB,,, | Performed by: INTERNAL MEDICINE

## 2019-10-21 PROCEDURE — 99204 PR OFFICE/OUTPT VISIT, NEW, LEVL IV, 45-59 MIN: ICD-10-PCS | Mod: S$GLB,,, | Performed by: INTERNAL MEDICINE

## 2019-10-21 RX ORDER — METOPROLOL SUCCINATE 25 MG/1
25 TABLET, EXTENDED RELEASE ORAL DAILY
Qty: 30 TABLET | Refills: 5 | Status: SHIPPED | OUTPATIENT
Start: 2019-10-21 | End: 2019-11-26

## 2019-10-21 RX ORDER — METOPROLOL SUCCINATE 25 MG/1
25 TABLET, EXTENDED RELEASE ORAL DAILY
COMMUNITY
End: 2019-10-21 | Stop reason: SDUPTHER

## 2019-10-21 NOTE — PROGRESS NOTES
Subjective:    Patient ID:  Marly Maciel is a 73 y.o. female     HPI   Here for a cardiac opinion.    I have had longstanding bronchiectasis.  For the last 9 years I have been treated for non tuberculous, mycobacterial disease.  Since this February I have received intravenous antibiotics, amikacin and imipenem.  Four weeks ago I was started on oral Clofazimine and azithromycin.  In the last week or so I have had severe exertional shortness of breath with a rapid pulse.  I had an electrocardiogram and an echocardiogram performed.  Since I have been on all these antibiotics, I have had near complete relief of the cough.  I have however lost my appetite, and have lost about 15 lb in weight over the last 2 years.    Past history in the 1980s I was told I had sarcoidosis.  I have had high blood pressure for the last few months and have been on oral losartan  I have had high cholesterol and have been on a statin for the last 4 years.  The HDL is in the 90s.  Parathyroidectomy for an adenoma, I had high serum calcium levels  Cholecystectomy, tonsillectomy, appendectomy.  I had a hysterectomy for fibroids  Hashimoto's thyroiditis  Current Outpatient Medications   Medication Sig    amikacin sulfate (AMIKACIN IV) Inject into the vein.    atorvastatin (LIPITOR) 10 MG tablet TAKE 1 TABLET BY MOUTH EVERY DAY    azithromycin (Z-HARSHAD) 250 MG tablet Take 250 mg by mouth 3 (three) times a week.    biotin 5,000 mcg TbDL Take 1 tablet by mouth once daily.    calcium phosphate trib/vit D3 (CALTRATE GUMMY BITES ORAL) Take 2 tablets by mouth once daily.    losartan (COZAAR) 50 MG tablet Take 0.5 tablets (25 mg total) by mouth once daily.    metoprolol succinate (TOPROL-XL) 25 MG 24 hr tablet Take 1 tablet (25 mg total) by mouth once daily.    mv-min/iron/folic/calcium/vitK (WOMEN'S MULTIVITAMIN ORAL) Take 1 tablet by mouth once daily.    promethazine (PHENERGAN) 12.5 MG Tab TAKE 1 TABLET (12.5 MG TOTAL) BY MOUTH EVERY 6  "(SIX) HOURS AS NEEDED.    promethazine-codeine 6.25-10 mg/5 ml (PHENERGAN WITH CODEINE) 6.25-10 mg/5 mL syrup TAKE 5 ML BY MOUTH EVERY 6 HOURS AS NEEDED COUGH. DO NOT EXCEED 30 ML A DAY     No current facility-administered medications for this visit.          Review of Systems   Constitution: Negative for chills, decreased appetite, fever, weight gain and weight loss.   HENT: Negative for congestion, hearing loss and sore throat.    Eyes: Negative for blurred vision, double vision and visual disturbance.   Cardiovascular: Positive for dyspnea on exertion and palpitations. Negative for chest pain, claudication, leg swelling and syncope.   Respiratory: Negative for cough, hemoptysis, shortness of breath, sputum production and wheezing.    Endocrine: Negative for cold intolerance and heat intolerance.   Hematologic/Lymphatic: Negative for bleeding problem. Does not bruise/bleed easily.   Skin: Negative for color change, dry skin, flushing and itching.   Musculoskeletal: Negative for back pain, joint pain and myalgias.   Gastrointestinal: Negative for abdominal pain, anorexia, constipation, diarrhea, dysphagia, nausea and vomiting.        No bleeding per rectum   Genitourinary: Negative for dysuria, flank pain, frequency, hematuria and nocturia.   Neurological: Negative for dizziness, headaches, light-headedness, loss of balance, seizures and tremors.   Psychiatric/Behavioral: Negative for altered mental status and depression.         Vitals:    10/21/19 1415   BP: (!) 160/78   Pulse: 78   Weight: 56.7 kg (125 lb)   Height: 5' 5" (1.651 m)     Objective:    Physical Exam   Constitutional: She is oriented to person, place, and time. She appears well-developed and well-nourished.   HENT:   Head: Normocephalic and atraumatic.   Nose: Nose normal.   Mouth/Throat: Oropharynx is clear and moist.   Eyes: Pupils are equal, round, and reactive to light. Conjunctivae and EOM are normal.   Neck: Neck supple. No tracheal deviation " present. No thyromegaly present.   Cardiovascular: Normal rate, regular rhythm and intact distal pulses. Exam reveals no gallop and no friction rub.   No murmur heard.  Pulmonary/Chest: No respiratory distress. She has no wheezes. She has no rales. She exhibits no tenderness.   Abdominal: Soft. Bowel sounds are normal. She exhibits no distension and no mass. There is no tenderness. There is no rebound and no guarding.   Musculoskeletal: Normal range of motion.   Lymphadenopathy:     She has no cervical adenopathy.   Neurological: She is alert and oriented to person, place, and time.   Skin: Skin is warm and dry.   Psychiatric: Her behavior is normal.       the EKG and echocardiogram were reviewed     BNP in June 2019 was 16.  Assessment:       1. Bronchiectasis without complication    2. Mycobacterial disease, pulmonary    3. History of sarcoidosis    4. Hashimoto's thyroiditis    5. Primary hyperparathyroidism , S/P parathyroidectomy   6. Essential hypertension    7. Chronic diastolic LV dysfunction.        Plan:       Prescribed metoprolol succinate to see if 1 gets any subjective improvement on the breathlessness on effort and the rapid heart rate on effort.  Check a Lexiscan Cardiolite test  Will discuss with Dr.Juzar Taylor

## 2019-10-21 NOTE — LETTER
October 21, 2019      Stacy Pereira MD  [address]  [facility] [address]           [facility]  [address], [address]  [facility] [address]  Phone: [phone]  Fax: [phone]          Patient: Marly Maciel   MR Number: [id number]   YOB: 1945   Date of Visit: 10/21/2019       Dear Dr. Stacy Pereira:    Thank you for referring Marly Maciel to me for evaluation. Attached you will find relevant portions of my assessment and plan of care.    If you have questions, please do not hesitate to call me. I look forward to following Marly Maciel along with you.    Sincerely,    Ben Shultz MD    Enclosure  CC:  No Recipients    If you would like to receive this communication electronically, please contact [email or web address] or [phone] to request more information on [employer] Link access.    For providers and/or their staff who would like to refer a patient to [facility], please contact us through our one-stop-shop provider referral line, [facility], at [phone].    If you feel you have received this communication in error or would no longer like to receive these types of communications, please e-mail [email or web address]

## 2019-10-22 ENCOUNTER — PATIENT MESSAGE (OUTPATIENT)
Dept: CARDIOLOGY | Facility: CLINIC | Age: 74
End: 2019-10-22

## 2019-11-01 RX ORDER — PROMETHAZINE HYDROCHLORIDE 12.5 MG/1
12.5 TABLET ORAL EVERY 6 HOURS PRN
Qty: 30 TABLET | Refills: 1 | Status: SHIPPED | OUTPATIENT
Start: 2019-11-01 | End: 2020-02-11

## 2019-11-07 ENCOUNTER — PATIENT MESSAGE (OUTPATIENT)
Dept: INTERNAL MEDICINE | Facility: CLINIC | Age: 74
End: 2019-11-07

## 2019-11-07 ENCOUNTER — TELEPHONE (OUTPATIENT)
Dept: INTERNAL MEDICINE | Facility: CLINIC | Age: 74
End: 2019-11-07

## 2019-11-07 RX ORDER — LOSARTAN POTASSIUM 50 MG/1
25 TABLET ORAL DAILY
Qty: 45 TABLET | Refills: 2 | Status: CANCELLED | OUTPATIENT
Start: 2019-11-07 | End: 2020-11-06

## 2019-11-07 NOTE — TELEPHONE ENCOUNTER
----- Message from Dayana Billingsley sent at 11/7/2019 11:23 AM CST -----  Contact: Rohit  with Pharmacy  Type: RX Refill Request    Who Called: Rohit     RX Name and Strength:losartan (COZAAR) 50 MG tablet    Preferred Pharmacy with phone number: [address]    Best Call Back Number: [phone]    Additional Information: N/A

## 2019-11-21 ENCOUNTER — CLINICAL SUPPORT (OUTPATIENT)
Dept: CARDIOLOGY | Facility: CLINIC | Age: 74
End: 2019-11-21
Attending: INTERNAL MEDICINE
Payer: MEDICARE

## 2019-11-21 DIAGNOSIS — R00.0 TACHYCARDIA: ICD-10-CM

## 2019-11-21 DIAGNOSIS — R00.2 PALPITATIONS: ICD-10-CM

## 2019-11-21 DIAGNOSIS — R06.02 SOB (SHORTNESS OF BREATH): ICD-10-CM

## 2019-11-21 PROCEDURE — 78452 NUCLEAR STRESS - OLP CLINIC (CUPID ONLY): ICD-10-PCS | Mod: 26,,, | Performed by: INTERNAL MEDICINE

## 2019-11-21 PROCEDURE — A9500 NUCLEAR STRESS - OLP CLINIC (CUPID ONLY): ICD-10-PCS | Mod: S$GLB,,, | Performed by: INTERNAL MEDICINE

## 2019-11-21 PROCEDURE — 78452 HT MUSCLE IMAGE SPECT MULT: CPT | Mod: 26,,, | Performed by: INTERNAL MEDICINE

## 2019-11-21 PROCEDURE — A9500 TC99M SESTAMIBI: HCPCS | Mod: S$GLB,,, | Performed by: INTERNAL MEDICINE

## 2019-11-21 PROCEDURE — 93015 CV STRESS TEST SUPVJ I&R: CPT | Mod: S$GLB,,, | Performed by: INTERNAL MEDICINE

## 2019-11-21 PROCEDURE — 93015 NUCLEAR STRESS - OLP CLINIC (CUPID ONLY): ICD-10-PCS | Mod: S$GLB,,, | Performed by: INTERNAL MEDICINE

## 2019-11-23 LAB
CV STRESS BASE HR: 81 BPM
DIASTOLIC BLOOD PRESSURE: 72 MMHG
NUC REST EJECTION FRACTION: 58
OHS CV CPX 1 MINUTE RECOVERY HEART RATE: 115 BPM
OHS CV CPX 85 PERCENT MAX PREDICTED HEART RATE MALE: 122
OHS CV CPX MAX PREDICTED HEART RATE: 144
OHS CV CPX PATIENT IS FEMALE: 1
OHS CV CPX PATIENT IS MALE: 0
OHS CV CPX PEAK DIASTOLIC BLOOD PRESSURE: 76 MMHG
OHS CV CPX PEAK HEAR RATE: 129 BPM
OHS CV CPX PEAK RATE PRESSURE PRODUCT: NORMAL
OHS CV CPX PEAK SYSTOLIC BLOOD PRESSURE: 102 MMHG
OHS CV CPX PERCENT MAX PREDICTED HEART RATE ACHIEVED: 90
OHS CV CPX RATE PRESSURE PRODUCT PRESENTING: 9558
STRESS ECHO POST EXERCISE DUR MIN: 2 MINUTES
STRESS ECHO POST EXERCISE DUR SEC: 26 SECONDS
SYSTOLIC BLOOD PRESSURE: 118 MMHG

## 2019-11-26 ENCOUNTER — OFFICE VISIT (OUTPATIENT)
Dept: CARDIOLOGY | Facility: CLINIC | Age: 74
End: 2019-11-26
Attending: INTERNAL MEDICINE
Payer: MEDICARE

## 2019-11-26 VITALS
SYSTOLIC BLOOD PRESSURE: 133 MMHG | DIASTOLIC BLOOD PRESSURE: 73 MMHG | HEART RATE: 62 BPM | WEIGHT: 126 LBS | BODY MASS INDEX: 20.99 KG/M2 | HEIGHT: 65 IN

## 2019-11-26 DIAGNOSIS — E06.3 HASHIMOTO'S THYROIDITIS: ICD-10-CM

## 2019-11-26 DIAGNOSIS — Z86.2 HISTORY OF SARCOIDOSIS: ICD-10-CM

## 2019-11-26 DIAGNOSIS — A31.0 MYCOBACTERIAL DISEASE, PULMONARY: ICD-10-CM

## 2019-11-26 DIAGNOSIS — I10 ESSENTIAL HYPERTENSION: ICD-10-CM

## 2019-11-26 DIAGNOSIS — I50.32 CHRONIC DIASTOLIC HEART FAILURE: ICD-10-CM

## 2019-11-26 DIAGNOSIS — J47.9 BRONCHIECTASIS WITHOUT COMPLICATION: Primary | ICD-10-CM

## 2019-11-26 DIAGNOSIS — E78.5 HYPERLIPIDEMIA, UNSPECIFIED HYPERLIPIDEMIA TYPE: ICD-10-CM

## 2019-11-26 PROCEDURE — 99214 PR OFFICE/OUTPT VISIT, EST, LEVL IV, 30-39 MIN: ICD-10-PCS | Mod: S$GLB,,, | Performed by: INTERNAL MEDICINE

## 2019-11-26 PROCEDURE — 1159F PR MEDICATION LIST DOCUMENTED IN MEDICAL RECORD: ICD-10-PCS | Mod: S$GLB,,, | Performed by: INTERNAL MEDICINE

## 2019-11-26 PROCEDURE — 1159F MED LIST DOCD IN RCRD: CPT | Mod: S$GLB,,, | Performed by: INTERNAL MEDICINE

## 2019-11-26 PROCEDURE — 99214 OFFICE O/P EST MOD 30 MIN: CPT | Mod: S$GLB,,, | Performed by: INTERNAL MEDICINE

## 2019-11-26 RX ORDER — METOPROLOL SUCCINATE 50 MG/1
50 TABLET, EXTENDED RELEASE ORAL DAILY
COMMUNITY
End: 2019-11-26

## 2019-11-26 RX ORDER — METOPROLOL SUCCINATE 50 MG/1
50 TABLET, EXTENDED RELEASE ORAL DAILY
Qty: 90 TABLET | Refills: 3 | Status: SHIPPED | OUTPATIENT
Start: 2019-11-26 | End: 2020-02-12

## 2019-11-27 NOTE — PROGRESS NOTES
Subjective:    Patient ID:  Marly Maciel is a 74 y.o. female     HPI   Here for follow-up of hypertension, chronic diastolic heart failure, bronchiectasis, mycobacterial disease, hyperlipidemia, high she motors thyroiditis, sarcoidosis.    When I 1st started metoprolol 25 mg, the palpitations went away.  They have now recurred.  My cough is much better, I have no shortness of breath.    Current Outpatient Medications   Medication Sig    amikacin sulfate (AMIKACIN IV) Inject into the vein.    atorvastatin (LIPITOR) 10 MG tablet TAKE 1 TABLET BY MOUTH EVERY DAY    azithromycin (Z-HARSHAD) 250 MG tablet Take 250 mg by mouth 3 (three) times a week.    biotin 5,000 mcg TbDL Take 1 tablet by mouth once daily.    calcium phosphate trib/vit D3 (CALTRATE GUMMY BITES ORAL) Take 2 tablets by mouth once daily.    FLUARIX QUAD 1633-6017, PF, 60 mcg (15 mcg x 4)/0.5 mL Syrg ADM 0.5ML IM UTD    losartan (COZAAR) 50 MG tablet Take 0.5 tablets (25 mg total) by mouth once daily.    metoprolol succinate (TOPROL-XL) 50 MG 24 hr tablet Take 1 tablet (50 mg total) by mouth once daily.    mv-min/iron/folic/calcium/vitK (WOMEN'S MULTIVITAMIN ORAL) Take 1 tablet by mouth once daily.    promethazine (PHENERGAN) 12.5 MG Tab TAKE 1 TABLET (12.5 MG TOTAL) BY MOUTH EVERY 6 (SIX) HOURS AS NEEDED.    promethazine-codeine 6.25-10 mg/5 ml (PHENERGAN WITH CODEINE) 6.25-10 mg/5 mL syrup TAKE 5 ML BY MOUTH EVERY 6 HOURS AS NEEDED COUGH. DO NOT EXCEED 30 ML A DAY     No current facility-administered medications for this visit.          Review of Systems   Constitution: Negative for chills, decreased appetite, fever, weight gain and weight loss.   HENT: Negative for congestion, hearing loss and sore throat.    Eyes: Negative for blurred vision, double vision and visual disturbance.   Cardiovascular: Positive for palpitations. Negative for chest pain, claudication, dyspnea on exertion, leg swelling and syncope.   Respiratory: Negative for  "cough, hemoptysis, shortness of breath, sputum production and wheezing.    Endocrine: Negative for cold intolerance and heat intolerance.   Hematologic/Lymphatic: Negative for bleeding problem. Does not bruise/bleed easily.   Skin: Negative for color change, dry skin, flushing and itching.   Musculoskeletal: Negative for back pain, joint pain and myalgias.   Gastrointestinal: Negative for abdominal pain, anorexia, constipation, diarrhea, dysphagia, nausea and vomiting.        No bleeding per rectum   Genitourinary: Negative for dysuria, flank pain, frequency, hematuria and nocturia.   Neurological: Negative for dizziness, headaches, light-headedness, loss of balance, seizures and tremors.   Psychiatric/Behavioral: Negative for altered mental status and depression.         Vitals:    11/26/19 1258   BP: 133/73   Pulse: 62   Weight: 57.2 kg (126 lb)   Height: 5' 5" (1.651 m)     Objective:    Physical Exam   Constitutional: She is oriented to person, place, and time. She appears well-developed and well-nourished.   HENT:   Head: Normocephalic and atraumatic.   Nose: Nose normal.   Mouth/Throat: Oropharynx is clear and moist.   Eyes: Pupils are equal, round, and reactive to light. Conjunctivae and EOM are normal.   Neck: Neck supple. No tracheal deviation present. No thyromegaly present.   Cardiovascular: Normal rate, regular rhythm and intact distal pulses. Exam reveals no gallop and no friction rub.   No murmur heard.  Pulmonary/Chest: No respiratory distress. She has no wheezes. She has no rales. She exhibits no tenderness.   Abdominal: Soft. Bowel sounds are normal. She exhibits no distension and no mass. There is no tenderness. There is no rebound and no guarding.   Musculoskeletal: Normal range of motion.   Lymphadenopathy:     She has no cervical adenopathy.   Neurological: She is alert and oriented to person, place, and time.   Skin: Skin is warm and dry.   Psychiatric: Her behavior is normal.       Cardiolite " stress test is negative for ischemia.  Findings discussed with the patient.    Assessment:       1. Bronchiectasis without complication    2. Essential hypertension    3. Mycobacterial disease, pulmonary    4. Chronic diastolic heart failure    5. Hyperlipidemia, unspecified hyperlipidemia type    6. History of sarcoidosis    7. Hashimoto's thyroiditis         Plan:       Increase metoprolol succinate to 50 mg daily.

## 2020-01-28 ENCOUNTER — PATIENT MESSAGE (OUTPATIENT)
Dept: INTERNAL MEDICINE | Facility: CLINIC | Age: 75
End: 2020-01-28

## 2020-01-28 ENCOUNTER — TELEPHONE (OUTPATIENT)
Dept: INTERNAL MEDICINE | Facility: CLINIC | Age: 75
End: 2020-01-28

## 2020-01-28 DIAGNOSIS — Z00.00 ANNUAL PHYSICAL EXAM: Primary | ICD-10-CM

## 2020-01-28 DIAGNOSIS — L65.9 HAIR LOSS: ICD-10-CM

## 2020-01-28 DIAGNOSIS — R25.2 MUSCLE CRAMPS: ICD-10-CM

## 2020-01-28 DIAGNOSIS — E21.0 PRIMARY HYPERPARATHYROIDISM: ICD-10-CM

## 2020-01-28 DIAGNOSIS — E78.5 HYPERLIPIDEMIA, UNSPECIFIED HYPERLIPIDEMIA TYPE: ICD-10-CM

## 2020-01-28 DIAGNOSIS — E55.9 VITAMIN D DEFICIENCY: ICD-10-CM

## 2020-01-29 ENCOUNTER — LAB VISIT (OUTPATIENT)
Dept: LAB | Facility: OTHER | Age: 75
End: 2020-01-29
Attending: INTERNAL MEDICINE
Payer: MEDICARE

## 2020-01-29 DIAGNOSIS — L65.9 HAIR LOSS: ICD-10-CM

## 2020-01-29 DIAGNOSIS — Z00.00 ANNUAL PHYSICAL EXAM: ICD-10-CM

## 2020-01-29 DIAGNOSIS — E78.5 HYPERLIPIDEMIA, UNSPECIFIED HYPERLIPIDEMIA TYPE: ICD-10-CM

## 2020-01-29 LAB
ALBUMIN SERPL BCP-MCNC: 4 G/DL (ref 3.5–5.2)
ALP SERPL-CCNC: 55 U/L (ref 55–135)
ALT SERPL W/O P-5'-P-CCNC: 19 U/L (ref 10–44)
ANION GAP SERPL CALC-SCNC: 6 MMOL/L (ref 8–16)
AST SERPL-CCNC: 23 U/L (ref 10–40)
BASOPHILS # BLD AUTO: 0.05 K/UL (ref 0–0.2)
BASOPHILS NFR BLD: 1 % (ref 0–1.9)
BILIRUB SERPL-MCNC: 0.8 MG/DL (ref 0.1–1)
BUN SERPL-MCNC: 13 MG/DL (ref 8–23)
CALCIUM SERPL-MCNC: 9.4 MG/DL (ref 8.7–10.5)
CHLORIDE SERPL-SCNC: 110 MMOL/L (ref 95–110)
CHOLEST SERPL-MCNC: 181 MG/DL (ref 120–199)
CHOLEST/HDLC SERPL: 2.3 {RATIO} (ref 2–5)
CO2 SERPL-SCNC: 27 MMOL/L (ref 23–29)
CREAT SERPL-MCNC: 0.8 MG/DL (ref 0.5–1.4)
DIFFERENTIAL METHOD: ABNORMAL
EOSINOPHIL # BLD AUTO: 0.2 K/UL (ref 0–0.5)
EOSINOPHIL NFR BLD: 3.1 % (ref 0–8)
ERYTHROCYTE [DISTWIDTH] IN BLOOD BY AUTOMATED COUNT: 17.1 % (ref 11.5–14.5)
EST. GFR  (AFRICAN AMERICAN): >60 ML/MIN/1.73 M^2
EST. GFR  (NON AFRICAN AMERICAN): >60 ML/MIN/1.73 M^2
GLUCOSE SERPL-MCNC: 107 MG/DL (ref 70–110)
HCT VFR BLD AUTO: 43.1 % (ref 37–48.5)
HDLC SERPL-MCNC: 80 MG/DL (ref 40–75)
HDLC SERPL: 44.2 % (ref 20–50)
HGB BLD-MCNC: 14 G/DL (ref 12–16)
IMM GRANULOCYTES # BLD AUTO: 0.01 K/UL (ref 0–0.04)
IMM GRANULOCYTES NFR BLD AUTO: 0.2 % (ref 0–0.5)
LDLC SERPL CALC-MCNC: 83.6 MG/DL (ref 63–159)
LYMPHOCYTES # BLD AUTO: 1.3 K/UL (ref 1–4.8)
LYMPHOCYTES NFR BLD: 25.6 % (ref 18–48)
MCH RBC QN AUTO: 33 PG (ref 27–31)
MCHC RBC AUTO-ENTMCNC: 32.5 G/DL (ref 32–36)
MCV RBC AUTO: 102 FL (ref 82–98)
MONOCYTES # BLD AUTO: 0.3 K/UL (ref 0.3–1)
MONOCYTES NFR BLD: 6.5 % (ref 4–15)
NEUTROPHILS # BLD AUTO: 3.3 K/UL (ref 1.8–7.7)
NEUTROPHILS NFR BLD: 63.6 % (ref 38–73)
NONHDLC SERPL-MCNC: 101 MG/DL
NRBC BLD-RTO: 0 /100 WBC
PLATELET # BLD AUTO: 203 K/UL (ref 150–350)
PMV BLD AUTO: 9.7 FL (ref 9.2–12.9)
POTASSIUM SERPL-SCNC: 4.6 MMOL/L (ref 3.5–5.1)
PROT SERPL-MCNC: 6.6 G/DL (ref 6–8.4)
RBC # BLD AUTO: 4.24 M/UL (ref 4–5.4)
SODIUM SERPL-SCNC: 143 MMOL/L (ref 136–145)
TRIGL SERPL-MCNC: 87 MG/DL (ref 30–150)
TSH SERPL DL<=0.005 MIU/L-ACNC: 1.75 UIU/ML (ref 0.4–4)
WBC # BLD AUTO: 5.2 K/UL (ref 3.9–12.7)

## 2020-01-29 PROCEDURE — 85025 COMPLETE CBC W/AUTO DIFF WBC: CPT

## 2020-01-29 PROCEDURE — 84443 ASSAY THYROID STIM HORMONE: CPT

## 2020-01-29 PROCEDURE — 80061 LIPID PANEL: CPT

## 2020-01-29 PROCEDURE — 36415 COLL VENOUS BLD VENIPUNCTURE: CPT

## 2020-01-29 PROCEDURE — 80053 COMPREHEN METABOLIC PANEL: CPT

## 2020-01-30 ENCOUNTER — TELEPHONE (OUTPATIENT)
Dept: INTERNAL MEDICINE | Facility: CLINIC | Age: 75
End: 2020-01-30

## 2020-01-30 DIAGNOSIS — R53.83 FATIGUE, UNSPECIFIED TYPE: Primary | ICD-10-CM

## 2020-01-30 DIAGNOSIS — Z79.899 OTHER LONG TERM (CURRENT) DRUG THERAPY: ICD-10-CM

## 2020-01-30 DIAGNOSIS — R73.01 IFG (IMPAIRED FASTING GLUCOSE): ICD-10-CM

## 2020-02-04 ENCOUNTER — LAB VISIT (OUTPATIENT)
Dept: LAB | Facility: OTHER | Age: 75
End: 2020-02-04
Attending: INTERNAL MEDICINE
Payer: MEDICARE

## 2020-02-04 DIAGNOSIS — Z00.00 ANNUAL PHYSICAL EXAM: ICD-10-CM

## 2020-02-04 DIAGNOSIS — Z79.899 OTHER LONG TERM (CURRENT) DRUG THERAPY: ICD-10-CM

## 2020-02-04 DIAGNOSIS — E55.9 VITAMIN D DEFICIENCY: ICD-10-CM

## 2020-02-04 DIAGNOSIS — E21.0 PRIMARY HYPERPARATHYROIDISM: ICD-10-CM

## 2020-02-04 DIAGNOSIS — R53.83 FATIGUE, UNSPECIFIED TYPE: ICD-10-CM

## 2020-02-04 DIAGNOSIS — R25.2 MUSCLE CRAMPS: ICD-10-CM

## 2020-02-04 DIAGNOSIS — L65.9 HAIR LOSS: ICD-10-CM

## 2020-02-04 DIAGNOSIS — R73.01 IFG (IMPAIRED FASTING GLUCOSE): ICD-10-CM

## 2020-02-04 LAB
25(OH)D3+25(OH)D2 SERPL-MCNC: 36 NG/ML (ref 30–96)
ESTIMATED AVG GLUCOSE: 82 MG/DL (ref 68–131)
FERRITIN SERPL-MCNC: 52 NG/ML (ref 20–300)
FOLATE SERPL-MCNC: 16.4 NG/ML (ref 4–24)
HBA1C MFR BLD HPLC: 4.5 % (ref 4–5.6)
IRON SERPL-MCNC: 204 UG/DL (ref 30–160)
MAGNESIUM SERPL-MCNC: 1.8 MG/DL (ref 1.6–2.6)
SATURATED IRON: 64 % (ref 20–50)
TOTAL IRON BINDING CAPACITY: 317 UG/DL (ref 250–450)
TRANSFERRIN SERPL-MCNC: 214 MG/DL (ref 200–375)
VIT B12 SERPL-MCNC: 774 PG/ML (ref 210–950)

## 2020-02-04 PROCEDURE — 82607 VITAMIN B-12: CPT

## 2020-02-04 PROCEDURE — 82306 VITAMIN D 25 HYDROXY: CPT

## 2020-02-04 PROCEDURE — 83735 ASSAY OF MAGNESIUM: CPT

## 2020-02-04 PROCEDURE — 83540 ASSAY OF IRON: CPT

## 2020-02-04 PROCEDURE — 83036 HEMOGLOBIN GLYCOSYLATED A1C: CPT

## 2020-02-04 PROCEDURE — 36415 COLL VENOUS BLD VENIPUNCTURE: CPT

## 2020-02-04 PROCEDURE — 82728 ASSAY OF FERRITIN: CPT

## 2020-02-04 PROCEDURE — 82746 ASSAY OF FOLIC ACID SERUM: CPT

## 2020-02-11 ENCOUNTER — OFFICE VISIT (OUTPATIENT)
Dept: INTERNAL MEDICINE | Facility: CLINIC | Age: 75
End: 2020-02-11
Attending: INTERNAL MEDICINE
Payer: MEDICARE

## 2020-02-11 VITALS
HEART RATE: 64 BPM | BODY MASS INDEX: 21.01 KG/M2 | SYSTOLIC BLOOD PRESSURE: 105 MMHG | OXYGEN SATURATION: 99 % | HEIGHT: 65 IN | WEIGHT: 126.13 LBS | DIASTOLIC BLOOD PRESSURE: 64 MMHG

## 2020-02-11 DIAGNOSIS — N95.9 MENOPAUSAL PROBLEM: ICD-10-CM

## 2020-02-11 DIAGNOSIS — Z90.89 H/O PARATHYROIDECTOMY: ICD-10-CM

## 2020-02-11 DIAGNOSIS — I77.810 AORTIC ECTASIA, THORACIC: ICD-10-CM

## 2020-02-11 DIAGNOSIS — I77.9 AORTIC DISEASE: ICD-10-CM

## 2020-02-11 DIAGNOSIS — E21.0 PRIMARY HYPERPARATHYROIDISM: ICD-10-CM

## 2020-02-11 DIAGNOSIS — Z98.890 H/O PARATHYROIDECTOMY: ICD-10-CM

## 2020-02-11 DIAGNOSIS — I10 ESSENTIAL HYPERTENSION: ICD-10-CM

## 2020-02-11 DIAGNOSIS — M85.80 OSTEOPENIA, UNSPECIFIED LOCATION: Primary | ICD-10-CM

## 2020-02-11 DIAGNOSIS — E55.9 VITAMIN D DEFICIENCY DISEASE: ICD-10-CM

## 2020-02-11 DIAGNOSIS — I50.32 CHRONIC DIASTOLIC HEART FAILURE: ICD-10-CM

## 2020-02-11 DIAGNOSIS — J47.9 BRONCHIECTASIS WITHOUT COMPLICATION: ICD-10-CM

## 2020-02-11 DIAGNOSIS — E78.5 HYPERLIPIDEMIA, UNSPECIFIED HYPERLIPIDEMIA TYPE: ICD-10-CM

## 2020-02-11 DIAGNOSIS — A31.0 MYCOBACTERIAL DISEASE, PULMONARY: ICD-10-CM

## 2020-02-11 PROBLEM — E89.2 H/O PARATHYROIDECTOMY: Status: ACTIVE | Noted: 2020-02-11

## 2020-02-11 PROCEDURE — 99213 OFFICE O/P EST LOW 20 MIN: CPT | Mod: PBBFAC | Performed by: INTERNAL MEDICINE

## 2020-02-11 PROCEDURE — 99214 PR OFFICE/OUTPT VISIT, EST, LEVL IV, 30-39 MIN: ICD-10-PCS | Mod: S$PBB,,, | Performed by: INTERNAL MEDICINE

## 2020-02-11 PROCEDURE — 99999 PR PBB SHADOW E&M-EST. PATIENT-LVL III: CPT | Mod: PBBFAC,,, | Performed by: INTERNAL MEDICINE

## 2020-02-11 PROCEDURE — 99999 PR PBB SHADOW E&M-EST. PATIENT-LVL III: ICD-10-PCS | Mod: PBBFAC,,, | Performed by: INTERNAL MEDICINE

## 2020-02-11 PROCEDURE — 99214 OFFICE O/P EST MOD 30 MIN: CPT | Mod: S$PBB,,, | Performed by: INTERNAL MEDICINE

## 2020-02-11 NOTE — PROGRESS NOTES
Subjective:   Patient ID: Marly Maciel is a 74 y.o. female  Chief complaint:   Chief Complaint   Patient presents with    Follow-up       HPI    Here for f/u    Bronchiectasis and pulm MAC and pulm pseudomonas colonization previously  followed by pulm/ID specialists in [place] - Dr. Brayden Marks in [place], [place] and was taking azithromycin 250mg daily at baseline now s/p IV abx for this and followed by Dr. Taylor at [facility] pulm     Completed IV abx for pulm Mac through [facility] clinic  Did not sunshine PO meds after IV meds completed 2/2 to severe sob and other sx   Cultures neg for AFB Oct and Nov 2019 and CT scan cleared   - per pt she reports that she called pulm specialiasts and attrib sx to combo fo oral meds   - then taking clofazimine and azithro - now off of these for 4 weeks after had SE   - then started having tachycardia and severe العراقي   - had EKG - normal - and echo EF 61% and grade 1 DD  - had lexiscan 11/2019 and negative   - started on metoprolol 25mg and seen by Dr. Raymond and inc to 50mg   Around [address] when she saw her son who is a cardiologist she stopped all abx and all sx resolved   - reports only symptoms at this time when standing up for a while and talking will get rapid heart beat but this does not occur with exercise   - currently will walk 2.5 mi per day and asymptomatic   - only when standing and talking but not at other times    - she is currently off of all abx     HTN:   Taking losartan and occ lightheaded when stand quickly    now taking BB     Hypothyroidism:   Hx of hashimotos and hyperparathyroidism dx 2/2 hyperCa diagnosed and treated with surgery 4/8/2014 at [facility] and s/p parathyroidectomy and partial thyroidectomy  - tsh at goal - not requiring levothyroxine at this time      MMG performed at [facility]   - utd 1/2020- ordered by Dr. Reyes - gyn     Osteopenia: taking tyson and MVI   Due for dexa     Aortic ectasia - ascending aorta - seen on CT report 7/2019 from [place]   - bp  "controlled   - sunshine statin   - no cp     Review of Systems    Objective:  Vitals:    02/11/20 1051   BP: 105/64   Pulse: 64   SpO2: 99%   Weight: 57.2 kg (126 lb 1.7 oz)   Height: 5' 5" (1.651 m)     Body mass index is 20.98 kg/m².    Physical Exam   Constitutional: She is oriented to person, place, and time. She appears well-developed and well-nourished.   HENT:   Head: Normocephalic and atraumatic.   Eyes: Conjunctivae and EOM are normal.   Neck: Normal range of motion. Neck supple.   Cardiovascular: Normal rate, regular rhythm and intact distal pulses.   Pulmonary/Chest: Effort normal and breath sounds normal.   Abdominal: Soft. Normal appearance and bowel sounds are normal.   Neurological: She is alert and oriented to person, place, and time. She has normal strength. Gait normal.   Skin: Skin is warm, dry and intact. No cyanosis. Nails show no clubbing.   Psychiatric: She has a normal mood and affect. Her speech is normal and behavior is normal. Cognition and memory are normal.   Vitals reviewed.      Assessment:  1. Osteopenia, unspecified location    2. Menopausal problem    3. Aortic ectasia, thoracic on CT 7/2019 performed in [place]     4. Vitamin D deficiency disease    5. Chronic diastolic heart failure    6. Hyperlipidemia, unspecified hyperlipidemia type    7. Essential hypertension    8. Mycobacterial disease, pulmonary    9. Bronchiectasis without complication    10. Primary hyperparathyroidism    11. H/O parathyroidectomy        Plan:  Marly was seen today for follow-up.    Diagnoses and all orders for this visit:    Osteopenia, unspecified location  -     DXA Bone Density Spine And Hip; Future  Cont vit d   Check dexa     Menopausal problem  -     DXA Bone Density Spine And Hip; Future    Aortic ectasia, thoracic on CT 7/2019 performed in [place]   - due to repeat CT 7/2020  Cont statin and bp control   Stopped asa in 2017 due to hemoptysis that resolved after stopping asa    Vitamin D deficiency " disease  Cont suppl     Chronic diastolic heart failure  Stable, bp controlled   euvolemic     Hyperlipidemia, unspecified hyperlipidemia type  Stable, cont statin     Essential hypertension  Stop arb as bp low and + orthostatic at times   Cont BB    Mycobacterial disease, pulmonary  F/u with pulm     Bronchiectasis without complication  F/u with pulm     Primary hyperparathyroidism  S/p parathyroidectomy - calcium wnl    H/O parathyroidectomy    Discussed palpitations and trigger suspect to be 2/2 to med SE   rec f/u with cards and BB   Er and rtc prompts reviewed   Stop losartan due to orthostatic sx and bp low normal today and at home     Health Maintenance   Topic Date Due    Pneumococcal Vaccine (65+ Low/Medium Risk) (2 of 2 - PPSV23) 08/30/2017    DEXA SCAN  04/10/2020    Mammogram  01/22/2021    Lipid Panel  01/29/2025    TETANUS VACCINE  06/14/2026    Colonoscopy  09/04/2028    Hepatitis C Screening  Completed

## 2020-02-12 ENCOUNTER — HOSPITAL ENCOUNTER (OUTPATIENT)
Dept: RADIOLOGY | Facility: OTHER | Age: 75
Discharge: HOME | End: 2020-02-12
Attending: INTERNAL MEDICINE
Payer: MEDICARE

## 2020-02-12 ENCOUNTER — TELEPHONE (OUTPATIENT)
Dept: INTERNAL MEDICINE | Facility: CLINIC | Age: 75
End: 2020-02-12

## 2020-02-12 DIAGNOSIS — M85.80 OSTEOPENIA, UNSPECIFIED LOCATION: ICD-10-CM

## 2020-02-12 DIAGNOSIS — N95.9 MENOPAUSAL PROBLEM: ICD-10-CM

## 2020-02-12 PROCEDURE — 77080 DEXA BONE DENSITY SPINE HIP: ICD-10-PCS | Mod: 26,,, | Performed by: RADIOLOGY

## 2020-02-12 PROCEDURE — 77080 DXA BONE DENSITY AXIAL: CPT | Mod: TC

## 2020-02-12 PROCEDURE — 77080 DXA BONE DENSITY AXIAL: CPT | Mod: 26,,, | Performed by: RADIOLOGY

## 2020-02-12 RX ORDER — METOPROLOL SUCCINATE 50 MG/1
50 TABLET, EXTENDED RELEASE ORAL DAILY
Qty: 90 TABLET | Refills: 2 | Status: SHIPPED | OUTPATIENT
Start: 2020-02-12 | End: 2021-02-02

## 2020-02-12 NOTE — TELEPHONE ENCOUNTER
Please arrange CTA in 6 months prior to next appt iwht me in 6 months to evaluate/monitor aortic ectasia seen on prior CT scan performed 7/2019 in [place]

## 2020-07-17 DIAGNOSIS — Z71.89 COMPLEX CARE COORDINATION: ICD-10-CM

## 2020-08-20 RX ORDER — ATORVASTATIN CALCIUM 10 MG/1
TABLET, FILM COATED ORAL
Qty: 90 TABLET | Refills: 1 | Status: SHIPPED | OUTPATIENT
Start: 2020-08-20 | End: 2021-02-18

## 2020-08-20 NOTE — TELEPHONE ENCOUNTER
No new care gaps identified.  Powered by [employer]. Reference number: [id number]. 8/20/2020 11:14:13 AM   OCTAVIOT

## 2020-08-20 NOTE — TELEPHONE ENCOUNTER
Refill Authorization Note    is requesting a refill authorization.    Brief assessment and rationale for refill: APPROVE: prr               Medication reconciliation completed: No                       Comments:      Orders Placed This Encounter    atorvastatin (LIPITOR) 10 MG tablet      Requested Prescriptions   Signed Prescriptions Disp Refills    atorvastatin (LIPITOR) 10 MG tablet 90 tablet 1     Sig: TAKE 1 TABLET BY MOUTH EVERY DAY       Cardiovascular:  Antilipid - Statins Passed - 8/20/2020 11:13 AM        Passed - Patient is at least 18 years old        Passed - Office visit in past 12 months or future 90 days.     Recent Outpatient Visits            6 months ago Osteopenia, unspecified location    [facility] [address] Stacy Pereira MD    8 months ago Bronchiectasis without complication    [facility] Ben Shultz MD    10 months ago Bronchiectasis without complication    [facility] Ben Shultz MD    11 months ago Essential hypertension    [facility] [address] Stacy Pereira MD    2 years ago Unintentional weight loss    [facility] [address] Stacy Pereira MD                    Passed - Lipid Panel completed in last 360 days     Lab Results   Component Value Date    CHOL 181 01/29/2020    HDL 80 (H) 01/29/2020    LDLCALC 83.6 01/29/2020    TRIG 87 01/29/2020             Passed - ALT is 94 or below and within 360 days     ALT   Date Value Ref Range Status   01/29/2020 19 10 - 44 U/L Final   06/03/2019 96 (H) 10 - 44 U/L Final   07/09/2018 16 10 - 44 U/L Final              Passed - AST is 54 or below and within 360 days     AST   Date Value Ref Range Status   01/29/2020 23 10 - 40 U/L Final   06/03/2019 96 (H) 10 - 40 U/L Final   07/09/2018 25 10 - 40 U/L Final                  Appointments  past 12m or future 3m with PCP    Date Provider   Last Visit   2/11/2020  Stacy Pereira MD   Next Visit   Visit date not found Stacy Pereira MD   ED visits in past 90 days: [unfilled]     Note composed:4:48 PM 08/20/2020

## 2020-09-23 DIAGNOSIS — R91.8 PULMONARY NODULES: ICD-10-CM

## 2020-09-23 DIAGNOSIS — J47.9 BRONCHIECTASIS WITHOUT COMPLICATION: Primary | ICD-10-CM

## 2020-10-01 ENCOUNTER — PATIENT MESSAGE (OUTPATIENT)
Dept: OTHER | Facility: OTHER | Age: 75
End: 2020-10-01

## 2020-10-07 ENCOUNTER — HOSPITAL ENCOUNTER (OUTPATIENT)
Dept: RADIOLOGY | Facility: OTHER | Age: 75
Discharge: HOME | End: 2020-10-07
Attending: INTERNAL MEDICINE
Payer: MEDICARE

## 2020-10-07 DIAGNOSIS — R91.8 PULMONARY NODULES: ICD-10-CM

## 2020-10-07 DIAGNOSIS — J47.9 BRONCHIECTASIS WITHOUT COMPLICATION: ICD-10-CM

## 2020-10-07 PROCEDURE — 71250 CT THORAX DX C-: CPT | Mod: TC

## 2020-10-07 PROCEDURE — 71250 CT CHEST WITHOUT CONTRAST: ICD-10-PCS | Mod: 26,,, | Performed by: RADIOLOGY

## 2020-10-07 PROCEDURE — 71250 CT THORAX DX C-: CPT | Mod: 26,,, | Performed by: RADIOLOGY

## 2020-10-21 ENCOUNTER — PATIENT MESSAGE (OUTPATIENT)
Dept: INTERNAL MEDICINE | Facility: CLINIC | Age: 75
End: 2020-10-21

## 2020-10-22 ENCOUNTER — TELEPHONE (OUTPATIENT)
Dept: INTERNAL MEDICINE | Facility: CLINIC | Age: 75
End: 2020-10-22

## 2020-10-22 DIAGNOSIS — R79.9 ABNORMAL FINDING OF BLOOD CHEMISTRY, UNSPECIFIED: ICD-10-CM

## 2020-10-22 DIAGNOSIS — R53.83 FATIGUE, UNSPECIFIED TYPE: Primary | ICD-10-CM

## 2020-10-22 DIAGNOSIS — R35.0 URINARY FREQUENCY: ICD-10-CM

## 2020-10-22 DIAGNOSIS — R63.1 INCREASED THIRST: ICD-10-CM

## 2020-10-22 NOTE — TELEPHONE ENCOUNTER
Sputum has been negative since Sept and now +1 AFB and in Oct sputum had Stenotropomonas - started on bactrim about 1 week ago and to take a total o f2 weeks   - now 2-3 weeks ago felt like was dehydrated - weakness, HA, saurav, sob  - has been trying to stay well hydrated   - spoke to her son who is cardiologist in [place]   - had CT scan - right lung with more involvement     - she called remedy room and they came to her house on Tuesday and given IVFs and felt well right after   - then started urinating and still feeling fatigued     No fevers     Weight stable in 123-126   Bactrim causes nausea and decreased PO intake but tolerating when does consume fluids and food - no emesis    - labs and urine ordered     - please arrange labs at [facility] location for tomorrow - nonfasting   - please CALL pt today to arrange - thanks!

## 2020-10-22 NOTE — TELEPHONE ENCOUNTER
----- Message from Vince Arvizu sent at 10/22/2020 12:18 PM CDT -----  Regarding: Appointment  Contact: ALAN CORTEZ [[id number]]  Name of Who is Calling: ALAN CORTEZ [[id number]]      What is the request in detail: Would like to speak with staff in regards to having a virtual appointment today. Patient did not want to reschedule or see another provider      Can the clinic reply by [facility]: no      What Number to Call Back [facility] not [facility]: [phone]

## 2020-10-23 ENCOUNTER — LAB VISIT (OUTPATIENT)
Dept: LAB | Facility: HOSPITAL | Age: 75
End: 2020-10-23
Attending: INTERNAL MEDICINE
Payer: MEDICARE

## 2020-10-23 DIAGNOSIS — R53.83 FATIGUE, UNSPECIFIED TYPE: ICD-10-CM

## 2020-10-23 DIAGNOSIS — R63.1 INCREASED THIRST: ICD-10-CM

## 2020-10-23 DIAGNOSIS — R79.9 ABNORMAL FINDING OF BLOOD CHEMISTRY, UNSPECIFIED: ICD-10-CM

## 2020-10-23 DIAGNOSIS — R35.0 URINARY FREQUENCY: ICD-10-CM

## 2020-10-23 LAB
ALBUMIN SERPL BCP-MCNC: 3.9 G/DL (ref 3.5–5.2)
ALP SERPL-CCNC: 62 U/L (ref 55–135)
ALT SERPL W/O P-5'-P-CCNC: 20 U/L (ref 10–44)
ANION GAP SERPL CALC-SCNC: 9 MMOL/L (ref 8–16)
AST SERPL-CCNC: 26 U/L (ref 10–40)
BASOPHILS # BLD AUTO: 0.05 K/UL (ref 0–0.2)
BASOPHILS NFR BLD: 1 % (ref 0–1.9)
BILIRUB SERPL-MCNC: 0.4 MG/DL (ref 0.1–1)
BNP SERPL-MCNC: 45 PG/ML (ref 0–99)
BUN SERPL-MCNC: 16 MG/DL (ref 8–23)
CALCIUM SERPL-MCNC: 9.3 MG/DL (ref 8.7–10.5)
CHLORIDE SERPL-SCNC: 103 MMOL/L (ref 95–110)
CO2 SERPL-SCNC: 29 MMOL/L (ref 23–29)
CREAT SERPL-MCNC: 1 MG/DL (ref 0.5–1.4)
DIFFERENTIAL METHOD: ABNORMAL
EOSINOPHIL # BLD AUTO: 0.1 K/UL (ref 0–0.5)
EOSINOPHIL NFR BLD: 1.8 % (ref 0–8)
ERYTHROCYTE [DISTWIDTH] IN BLOOD BY AUTOMATED COUNT: 13.2 % (ref 11.5–14.5)
EST. GFR  (AFRICAN AMERICAN): >60 ML/MIN/1.73 M^2
EST. GFR  (NON AFRICAN AMERICAN): 56.4 ML/MIN/1.73 M^2
ESTIMATED AVG GLUCOSE: 91 MG/DL (ref 68–131)
GLUCOSE SERPL-MCNC: 98 MG/DL (ref 70–110)
HBA1C MFR BLD HPLC: 4.8 % (ref 4–5.6)
HCT VFR BLD AUTO: 47.4 % (ref 37–48.5)
HGB BLD-MCNC: 15.2 G/DL (ref 12–16)
IMM GRANULOCYTES # BLD AUTO: 0.01 K/UL (ref 0–0.04)
IMM GRANULOCYTES NFR BLD AUTO: 0.2 % (ref 0–0.5)
LYMPHOCYTES # BLD AUTO: 1.2 K/UL (ref 1–4.8)
LYMPHOCYTES NFR BLD: 24.6 % (ref 18–48)
MAGNESIUM SERPL-MCNC: 1.8 MG/DL (ref 1.6–2.6)
MCH RBC QN AUTO: 32.1 PG (ref 27–31)
MCHC RBC AUTO-ENTMCNC: 32.1 G/DL (ref 32–36)
MCV RBC AUTO: 100 FL (ref 82–98)
MONOCYTES # BLD AUTO: 0.4 K/UL (ref 0.3–1)
MONOCYTES NFR BLD: 7 % (ref 4–15)
NEUTROPHILS # BLD AUTO: 3.3 K/UL (ref 1.8–7.7)
NEUTROPHILS NFR BLD: 65.4 % (ref 38–73)
NRBC BLD-RTO: 0 /100 WBC
PHOSPHATE SERPL-MCNC: 3.6 MG/DL (ref 2.7–4.5)
PLATELET # BLD AUTO: 245 K/UL (ref 150–350)
PMV BLD AUTO: 9.6 FL (ref 9.2–12.9)
POTASSIUM SERPL-SCNC: 5 MMOL/L (ref 3.5–5.1)
PROT SERPL-MCNC: 6.8 G/DL (ref 6–8.4)
RBC # BLD AUTO: 4.73 M/UL (ref 4–5.4)
SARS-COV-2 IGG SERPLBLD QL IA.RAPID: NEGATIVE
SODIUM SERPL-SCNC: 141 MMOL/L (ref 136–145)
TSH SERPL DL<=0.005 MIU/L-ACNC: 1.3 UIU/ML (ref 0.4–4)
WBC # BLD AUTO: 4.99 K/UL (ref 3.9–12.7)

## 2020-10-23 PROCEDURE — 80053 COMPREHEN METABOLIC PANEL: CPT

## 2020-10-23 PROCEDURE — 83036 HEMOGLOBIN GLYCOSYLATED A1C: CPT

## 2020-10-23 PROCEDURE — 84443 ASSAY THYROID STIM HORMONE: CPT

## 2020-10-23 PROCEDURE — 36415 COLL VENOUS BLD VENIPUNCTURE: CPT | Mod: PN

## 2020-10-23 PROCEDURE — 85025 COMPLETE CBC W/AUTO DIFF WBC: CPT

## 2020-10-23 PROCEDURE — 86769 SARS-COV-2 COVID-19 ANTIBODY: CPT

## 2020-10-23 PROCEDURE — 83880 ASSAY OF NATRIURETIC PEPTIDE: CPT

## 2020-10-23 PROCEDURE — 84100 ASSAY OF PHOSPHORUS: CPT

## 2020-10-23 PROCEDURE — 83735 ASSAY OF MAGNESIUM: CPT

## 2020-11-13 ENCOUNTER — TELEPHONE (OUTPATIENT)
Dept: INTERNAL MEDICINE | Facility: CLINIC | Age: 75
End: 2020-11-13

## 2020-11-13 DIAGNOSIS — I10 HYPERTENSION, BENIGN: Primary | ICD-10-CM

## 2020-11-13 NOTE — TELEPHONE ENCOUNTER
----- Message from Sydnie Ashby sent at 11/12/2020  4:36 PM CST -----  Name of Who is Calling: ALAN CORTEZ [[id number]]      What is the request in detail: Pt states she needs an order for a kidney function test placed in the system. Please contact to further discuss and advise.        Can the clinic reply by [facility]: N      What Number to Call Back if not in [facility]:  [phone]

## 2020-11-24 ENCOUNTER — PATIENT MESSAGE (OUTPATIENT)
Dept: INTERNAL MEDICINE | Facility: CLINIC | Age: 75
End: 2020-11-24

## 2020-11-30 ENCOUNTER — LAB VISIT (OUTPATIENT)
Dept: LAB | Facility: HOSPITAL | Age: 75
End: 2020-11-30
Attending: INTERNAL MEDICINE
Payer: MEDICARE

## 2020-11-30 ENCOUNTER — EXTERNAL CHRONIC CARE MANAGEMENT (OUTPATIENT)
Dept: PRIMARY CARE CLINIC | Facility: CLINIC | Age: 75
End: 2020-11-30
Payer: MEDICARE

## 2020-11-30 DIAGNOSIS — I10 HYPERTENSION, BENIGN: ICD-10-CM

## 2020-11-30 LAB
ANION GAP SERPL CALC-SCNC: 11 MMOL/L (ref 8–16)
BUN SERPL-MCNC: 17 MG/DL (ref 8–23)
CALCIUM SERPL-MCNC: 9.2 MG/DL (ref 8.7–10.5)
CHLORIDE SERPL-SCNC: 101 MMOL/L (ref 95–110)
CO2 SERPL-SCNC: 27 MMOL/L (ref 23–29)
CREAT SERPL-MCNC: 0.8 MG/DL (ref 0.5–1.4)
EST. GFR  (AFRICAN AMERICAN): >60 ML/MIN/1.73 M^2
EST. GFR  (NON AFRICAN AMERICAN): >60 ML/MIN/1.73 M^2
GLUCOSE SERPL-MCNC: 95 MG/DL (ref 70–110)
POTASSIUM SERPL-SCNC: 4.6 MMOL/L (ref 3.5–5.1)
SODIUM SERPL-SCNC: 139 MMOL/L (ref 136–145)

## 2020-11-30 PROCEDURE — 99487 CPLX CHRNC CARE 1ST 60 MIN: CPT | Mod: S$PBB,,, | Performed by: INTERNAL MEDICINE

## 2020-11-30 PROCEDURE — 80048 BASIC METABOLIC PNL TOTAL CA: CPT

## 2020-11-30 PROCEDURE — 36415 COLL VENOUS BLD VENIPUNCTURE: CPT | Mod: PN

## 2020-11-30 PROCEDURE — 99487 PR COMPLX CHRON CARE MGMT, 1ST HR, PER MONTH: ICD-10-PCS | Mod: S$PBB,,, | Performed by: INTERNAL MEDICINE

## 2020-11-30 PROCEDURE — 99489 CPLX CHRNC CARE EA ADDL 30: CPT | Mod: PBBFAC | Performed by: INTERNAL MEDICINE

## 2020-11-30 PROCEDURE — 99487 CPLX CHRNC CARE 1ST 60 MIN: CPT | Mod: PBBFAC | Performed by: INTERNAL MEDICINE

## 2020-11-30 PROCEDURE — 99489 PR COMPLX CHRON CARE MGMT, EA ADDTL 30 MIN, PER MONTH: ICD-10-PCS | Mod: S$PBB,,, | Performed by: INTERNAL MEDICINE

## 2020-11-30 PROCEDURE — 99489 CPLX CHRNC CARE EA ADDL 30: CPT | Mod: S$PBB,,, | Performed by: INTERNAL MEDICINE

## 2020-12-01 ENCOUNTER — PATIENT MESSAGE (OUTPATIENT)
Dept: INTERNAL MEDICINE | Facility: CLINIC | Age: 75
End: 2020-12-01

## 2020-12-11 ENCOUNTER — PATIENT MESSAGE (OUTPATIENT)
Dept: OTHER | Facility: OTHER | Age: 75
End: 2020-12-11

## 2020-12-31 ENCOUNTER — EXTERNAL CHRONIC CARE MANAGEMENT (OUTPATIENT)
Dept: PRIMARY CARE CLINIC | Facility: CLINIC | Age: 75
End: 2020-12-31
Payer: MEDICARE

## 2020-12-31 PROCEDURE — 99490 PR CHRONIC CARE MGMT, 1ST 20 MIN: ICD-10-PCS | Mod: S$PBB,,, | Performed by: INTERNAL MEDICINE

## 2020-12-31 PROCEDURE — 99490 CHRNC CARE MGMT STAFF 1ST 20: CPT | Mod: PBBFAC | Performed by: INTERNAL MEDICINE

## 2020-12-31 PROCEDURE — 99490 CHRNC CARE MGMT STAFF 1ST 20: CPT | Mod: S$PBB,,, | Performed by: INTERNAL MEDICINE

## 2021-01-10 ENCOUNTER — IMMUNIZATION (OUTPATIENT)
Dept: INTERNAL MEDICINE | Facility: CLINIC | Age: 76
End: 2021-01-10
Payer: MEDICARE

## 2021-01-10 DIAGNOSIS — Z23 NEED FOR VACCINATION: ICD-10-CM

## 2021-01-10 PROCEDURE — 91300 COVID-19, MRNA, LNP-S, PF, 30 MCG/0.3 ML DOSE VACCINE: CPT | Mod: PBBFAC

## 2021-01-31 ENCOUNTER — EXTERNAL CHRONIC CARE MANAGEMENT (OUTPATIENT)
Dept: PRIMARY CARE CLINIC | Facility: CLINIC | Age: 76
End: 2021-01-31
Payer: MEDICARE

## 2021-01-31 ENCOUNTER — IMMUNIZATION (OUTPATIENT)
Dept: INTERNAL MEDICINE | Facility: CLINIC | Age: 76
End: 2021-01-31
Payer: MEDICARE

## 2021-01-31 DIAGNOSIS — Z23 NEED FOR VACCINATION: Primary | ICD-10-CM

## 2021-01-31 PROCEDURE — 99490 CHRNC CARE MGMT STAFF 1ST 20: CPT | Mod: PBBFAC | Performed by: INTERNAL MEDICINE

## 2021-01-31 PROCEDURE — 0002A COVID-19, MRNA, LNP-S, PF, 30 MCG/0.3 ML DOSE VACCINE: CPT | Mod: PBBFAC

## 2021-01-31 PROCEDURE — 99490 CHRNC CARE MGMT STAFF 1ST 20: CPT | Mod: S$PBB,,, | Performed by: INTERNAL MEDICINE

## 2021-01-31 PROCEDURE — 99490 PR CHRONIC CARE MGMT, 1ST 20 MIN: ICD-10-PCS | Mod: S$PBB,,, | Performed by: INTERNAL MEDICINE

## 2021-01-31 PROCEDURE — 91300 COVID-19, MRNA, LNP-S, PF, 30 MCG/0.3 ML DOSE VACCINE: CPT | Mod: PBBFAC

## 2021-02-28 ENCOUNTER — EXTERNAL CHRONIC CARE MANAGEMENT (OUTPATIENT)
Dept: PRIMARY CARE CLINIC | Facility: CLINIC | Age: 76
End: 2021-02-28
Payer: MEDICARE

## 2021-02-28 PROCEDURE — 99490 CHRNC CARE MGMT STAFF 1ST 20: CPT | Mod: PBBFAC | Performed by: INTERNAL MEDICINE

## 2021-02-28 PROCEDURE — 99490 PR CHRONIC CARE MGMT, 1ST 20 MIN: ICD-10-PCS | Mod: S$PBB,,, | Performed by: INTERNAL MEDICINE

## 2021-02-28 PROCEDURE — 99490 CHRNC CARE MGMT STAFF 1ST 20: CPT | Mod: S$PBB,,, | Performed by: INTERNAL MEDICINE

## 2021-03-18 ENCOUNTER — PATIENT MESSAGE (OUTPATIENT)
Dept: RESEARCH | Facility: HOSPITAL | Age: 76
End: 2021-03-18

## 2021-03-26 ENCOUNTER — PATIENT MESSAGE (OUTPATIENT)
Dept: RESEARCH | Facility: HOSPITAL | Age: 76
End: 2021-03-26

## 2021-03-31 ENCOUNTER — EXTERNAL CHRONIC CARE MANAGEMENT (OUTPATIENT)
Dept: PRIMARY CARE CLINIC | Facility: CLINIC | Age: 76
End: 2021-03-31
Payer: MEDICARE

## 2021-03-31 PROCEDURE — 99490 CHRNC CARE MGMT STAFF 1ST 20: CPT | Mod: S$PBB,,, | Performed by: INTERNAL MEDICINE

## 2021-03-31 PROCEDURE — 99490 CHRNC CARE MGMT STAFF 1ST 20: CPT | Mod: PBBFAC | Performed by: INTERNAL MEDICINE

## 2021-03-31 PROCEDURE — 99490 PR CHRONIC CARE MGMT, 1ST 20 MIN: ICD-10-PCS | Mod: S$PBB,,, | Performed by: INTERNAL MEDICINE

## 2021-05-20 RX ORDER — ATORVASTATIN CALCIUM 10 MG/1
TABLET, FILM COATED ORAL
Qty: 90 TABLET | Refills: 0 | Status: SHIPPED | OUTPATIENT
Start: 2021-05-20 | End: 2021-08-18

## 2021-05-24 ENCOUNTER — PES CALL (OUTPATIENT)
Dept: ADMINISTRATIVE | Facility: CLINIC | Age: 76
End: 2021-05-24

## 2021-06-01 ENCOUNTER — TELEPHONE (OUTPATIENT)
Dept: ADMINISTRATIVE | Facility: CLINIC | Age: 76
End: 2021-06-01

## 2021-06-02 ENCOUNTER — OFFICE VISIT (OUTPATIENT)
Dept: HOME HEALTH SERVICES | Facility: CLINIC | Age: 76
End: 2021-06-02
Payer: MEDICARE

## 2021-06-02 VITALS — HEIGHT: 65 IN | BODY MASS INDEX: 20.69 KG/M2 | WEIGHT: 124.19 LBS

## 2021-06-02 DIAGNOSIS — E89.0 S/P PARTIAL THYROIDECTOMY: ICD-10-CM

## 2021-06-02 DIAGNOSIS — J47.9 BRONCHIECTASIS WITHOUT COMPLICATION: ICD-10-CM

## 2021-06-02 DIAGNOSIS — I50.32 CHRONIC DIASTOLIC HEART FAILURE: ICD-10-CM

## 2021-06-02 DIAGNOSIS — M85.80 OSTEOPENIA, UNSPECIFIED LOCATION: ICD-10-CM

## 2021-06-02 DIAGNOSIS — I10 ESSENTIAL HYPERTENSION: ICD-10-CM

## 2021-06-02 DIAGNOSIS — A31.0 MYCOBACTERIAL DISEASE, PULMONARY: ICD-10-CM

## 2021-06-02 DIAGNOSIS — I70.0 ATHEROSCLEROSIS OF AORTA: ICD-10-CM

## 2021-06-02 DIAGNOSIS — Z90.89 H/O PARATHYROIDECTOMY: ICD-10-CM

## 2021-06-02 DIAGNOSIS — Z86.2 HISTORY OF SARCOIDOSIS: ICD-10-CM

## 2021-06-02 DIAGNOSIS — Z00.00 ENCOUNTER FOR PREVENTIVE HEALTH EXAMINATION: Primary | ICD-10-CM

## 2021-06-02 DIAGNOSIS — E78.5 HYPERLIPIDEMIA, UNSPECIFIED HYPERLIPIDEMIA TYPE: ICD-10-CM

## 2021-06-02 DIAGNOSIS — Z98.890 H/O PARATHYROIDECTOMY: ICD-10-CM

## 2021-06-02 PROCEDURE — G0439 PPPS, SUBSEQ VISIT: HCPCS | Mod: 95,,, | Performed by: NURSE PRACTITIONER

## 2021-06-02 PROCEDURE — G0439 PR MEDICARE ANNUAL WELLNESS SUBSEQUENT VISIT: ICD-10-PCS | Mod: 95,,, | Performed by: NURSE PRACTITIONER

## 2021-06-02 RX ORDER — BIOTIN 1 MG
1000 TABLET ORAL 3 TIMES DAILY
COMMUNITY
End: 2024-02-22

## 2021-06-03 PROBLEM — I70.0 ATHEROSCLEROSIS OF AORTA: Status: ACTIVE | Noted: 2021-06-03

## 2021-06-29 ENCOUNTER — OFFICE VISIT (OUTPATIENT)
Dept: INTERNAL MEDICINE | Facility: CLINIC | Age: 76
End: 2021-06-29
Attending: INTERNAL MEDICINE
Payer: MEDICARE

## 2021-06-29 VITALS
WEIGHT: 127.19 LBS | HEART RATE: 79 BPM | DIASTOLIC BLOOD PRESSURE: 82 MMHG | BODY MASS INDEX: 21.19 KG/M2 | OXYGEN SATURATION: 98 % | HEIGHT: 65 IN | SYSTOLIC BLOOD PRESSURE: 122 MMHG

## 2021-06-29 DIAGNOSIS — E21.0 PRIMARY HYPERPARATHYROIDISM: ICD-10-CM

## 2021-06-29 DIAGNOSIS — E89.0 S/P PARTIAL THYROIDECTOMY: ICD-10-CM

## 2021-06-29 DIAGNOSIS — Z98.890 H/O PARATHYROIDECTOMY: ICD-10-CM

## 2021-06-29 DIAGNOSIS — I10 ESSENTIAL HYPERTENSION: ICD-10-CM

## 2021-06-29 DIAGNOSIS — E78.5 HYPERLIPIDEMIA, UNSPECIFIED HYPERLIPIDEMIA TYPE: Primary | ICD-10-CM

## 2021-06-29 DIAGNOSIS — M85.80 OSTEOPENIA, UNSPECIFIED LOCATION: ICD-10-CM

## 2021-06-29 DIAGNOSIS — R73.9 HYPERGLYCEMIA: ICD-10-CM

## 2021-06-29 DIAGNOSIS — A31.0 MYCOBACTERIAL DISEASE, PULMONARY: ICD-10-CM

## 2021-06-29 DIAGNOSIS — Z90.89 H/O PARATHYROIDECTOMY: ICD-10-CM

## 2021-06-29 PROBLEM — I51.89 DIASTOLIC DYSFUNCTION: Status: ACTIVE | Noted: 2021-06-29

## 2021-06-29 PROCEDURE — 99214 OFFICE O/P EST MOD 30 MIN: CPT | Mod: S$PBB,,, | Performed by: INTERNAL MEDICINE

## 2021-06-29 PROCEDURE — 99999 PR PBB SHADOW E&M-EST. PATIENT-LVL III: ICD-10-PCS | Mod: PBBFAC,,, | Performed by: INTERNAL MEDICINE

## 2021-06-29 PROCEDURE — 99999 PR PBB SHADOW E&M-EST. PATIENT-LVL III: CPT | Mod: PBBFAC,,, | Performed by: INTERNAL MEDICINE

## 2021-06-29 PROCEDURE — 99213 OFFICE O/P EST LOW 20 MIN: CPT | Mod: PBBFAC | Performed by: INTERNAL MEDICINE

## 2021-06-29 PROCEDURE — 99214 PR OFFICE/OUTPT VISIT, EST, LEVL IV, 30-39 MIN: ICD-10-PCS | Mod: S$PBB,,, | Performed by: INTERNAL MEDICINE

## 2021-06-29 RX ORDER — CELECOXIB 200 MG/1
200 CAPSULE ORAL
COMMUNITY
Start: 2021-06-04

## 2021-06-30 ENCOUNTER — EXTERNAL CHRONIC CARE MANAGEMENT (OUTPATIENT)
Dept: PRIMARY CARE CLINIC | Facility: CLINIC | Age: 76
End: 2021-06-30
Payer: MEDICARE

## 2021-06-30 PROCEDURE — 99490 CHRNC CARE MGMT STAFF 1ST 20: CPT | Mod: S$PBB,,, | Performed by: INTERNAL MEDICINE

## 2021-06-30 PROCEDURE — 99490 PR CHRONIC CARE MGMT, 1ST 20 MIN: ICD-10-PCS | Mod: S$PBB,,, | Performed by: INTERNAL MEDICINE

## 2021-06-30 PROCEDURE — 99490 CHRNC CARE MGMT STAFF 1ST 20: CPT | Mod: PBBFAC | Performed by: INTERNAL MEDICINE

## 2021-07-13 ENCOUNTER — LAB VISIT (OUTPATIENT)
Dept: LAB | Facility: HOSPITAL | Age: 76
End: 2021-07-13
Attending: INTERNAL MEDICINE
Payer: MEDICARE

## 2021-07-13 DIAGNOSIS — R73.9 HYPERGLYCEMIA: ICD-10-CM

## 2021-07-13 DIAGNOSIS — I10 ESSENTIAL HYPERTENSION: ICD-10-CM

## 2021-07-13 DIAGNOSIS — E78.5 HYPERLIPIDEMIA, UNSPECIFIED HYPERLIPIDEMIA TYPE: ICD-10-CM

## 2021-07-13 LAB
ANION GAP SERPL CALC-SCNC: 8 MMOL/L (ref 8–16)
BUN SERPL-MCNC: 18 MG/DL (ref 8–23)
CALCIUM SERPL-MCNC: 9.7 MG/DL (ref 8.7–10.5)
CHLORIDE SERPL-SCNC: 102 MMOL/L (ref 95–110)
CHOLEST SERPL-MCNC: 167 MG/DL (ref 120–199)
CHOLEST/HDLC SERPL: 2.3 {RATIO} (ref 2–5)
CO2 SERPL-SCNC: 29 MMOL/L (ref 23–29)
CREAT SERPL-MCNC: 0.7 MG/DL (ref 0.5–1.4)
EST. GFR  (AFRICAN AMERICAN): >60 ML/MIN/1.73 M^2
EST. GFR  (NON AFRICAN AMERICAN): >60 ML/MIN/1.73 M^2
ESTIMATED AVG GLUCOSE: 97 MG/DL (ref 68–131)
GLUCOSE SERPL-MCNC: 102 MG/DL (ref 70–110)
HBA1C MFR BLD: 5 % (ref 4–5.6)
HDLC SERPL-MCNC: 72 MG/DL (ref 40–75)
HDLC SERPL: 43.1 % (ref 20–50)
LDLC SERPL CALC-MCNC: 73.8 MG/DL (ref 63–159)
NONHDLC SERPL-MCNC: 95 MG/DL
POTASSIUM SERPL-SCNC: 4.5 MMOL/L (ref 3.5–5.1)
SODIUM SERPL-SCNC: 139 MMOL/L (ref 136–145)
TRIGL SERPL-MCNC: 106 MG/DL (ref 30–150)
TSH SERPL DL<=0.005 MIU/L-ACNC: 1.14 UIU/ML (ref 0.4–4)

## 2021-07-13 PROCEDURE — 84443 ASSAY THYROID STIM HORMONE: CPT | Performed by: INTERNAL MEDICINE

## 2021-07-13 PROCEDURE — 80061 LIPID PANEL: CPT | Performed by: INTERNAL MEDICINE

## 2021-07-13 PROCEDURE — 83036 HEMOGLOBIN GLYCOSYLATED A1C: CPT | Performed by: INTERNAL MEDICINE

## 2021-07-13 PROCEDURE — 36415 COLL VENOUS BLD VENIPUNCTURE: CPT | Mod: PN | Performed by: INTERNAL MEDICINE

## 2021-07-13 PROCEDURE — 80048 BASIC METABOLIC PNL TOTAL CA: CPT | Performed by: INTERNAL MEDICINE

## 2021-07-23 DIAGNOSIS — A31.8 MYCOBACTERIUM ABSCESSUS INFECTION: ICD-10-CM

## 2021-07-23 DIAGNOSIS — J98.4 CAVITARY LUNG DISEASE: Primary | ICD-10-CM

## 2021-07-26 ENCOUNTER — TELEPHONE (OUTPATIENT)
Dept: INTERVENTIONAL RADIOLOGY/VASCULAR | Facility: OTHER | Age: 76
End: 2021-07-26

## 2021-07-27 ENCOUNTER — HOSPITAL ENCOUNTER (OUTPATIENT)
Facility: OTHER | Age: 76
Discharge: HOME | End: 2021-07-27
Attending: RADIOLOGY | Admitting: RADIOLOGY
Payer: MEDICARE

## 2021-07-27 VITALS
TEMPERATURE: 98 F | HEART RATE: 65 BPM | HEIGHT: 65 IN | OXYGEN SATURATION: 96 % | WEIGHT: 123 LBS | SYSTOLIC BLOOD PRESSURE: 157 MMHG | RESPIRATION RATE: 16 BRPM | BODY MASS INDEX: 20.49 KG/M2 | DIASTOLIC BLOOD PRESSURE: 72 MMHG

## 2021-07-27 DIAGNOSIS — J98.4 CAVITARY PNEUMONIA: ICD-10-CM

## 2021-07-27 DIAGNOSIS — J98.4 CAVITARY LUNG DISEASE: ICD-10-CM

## 2021-07-27 DIAGNOSIS — J18.9 CAVITARY PNEUMONIA: ICD-10-CM

## 2021-07-27 DIAGNOSIS — A31.8 MYCOBACTERIUM ABSCESSUS INFECTION: ICD-10-CM

## 2021-07-27 PROCEDURE — 25000003 PHARM REV CODE 250: Performed by: RADIOLOGY

## 2021-07-27 PROCEDURE — C1894 INTRO/SHEATH, NON-LASER: HCPCS | Performed by: RADIOLOGY

## 2021-07-27 PROCEDURE — 25500020 PHARM REV CODE 255: Performed by: RADIOLOGY

## 2021-07-27 PROCEDURE — C1751 CATH, INF, PER/CENT/MIDLINE: HCPCS | Performed by: RADIOLOGY

## 2021-07-27 DEVICE — POWERPICC® CATHETER WITH SHERLOCK TIP LOCATION SYSTEM (TLS) STYLET 5F DUAL-LUMEN FULL TRAY
Type: IMPLANTABLE DEVICE | Site: ARM | Status: FUNCTIONAL
Brand: POWERPICC® CATHETER

## 2021-07-27 RX ORDER — IODIXANOL 270 MG/ML
INJECTION, SOLUTION INTRAVASCULAR
Status: DISCONTINUED | OUTPATIENT
Start: 2021-07-27 | End: 2021-07-27 | Stop reason: HOSPADM

## 2021-07-27 RX ORDER — LIDOCAINE HYDROCHLORIDE 10 MG/ML
INJECTION INFILTRATION; PERINEURAL
Status: DISCONTINUED | OUTPATIENT
Start: 2021-07-27 | End: 2021-07-27 | Stop reason: HOSPADM

## 2021-07-31 ENCOUNTER — EXTERNAL CHRONIC CARE MANAGEMENT (OUTPATIENT)
Dept: PRIMARY CARE CLINIC | Facility: CLINIC | Age: 76
End: 2021-07-31
Payer: MEDICARE

## 2021-07-31 PROCEDURE — 99490 CHRNC CARE MGMT STAFF 1ST 20: CPT | Mod: S$PBB,,, | Performed by: INTERNAL MEDICINE

## 2021-07-31 PROCEDURE — 99490 CHRNC CARE MGMT STAFF 1ST 20: CPT | Mod: PBBFAC | Performed by: INTERNAL MEDICINE

## 2021-07-31 PROCEDURE — 99490 PR CHRONIC CARE MGMT, 1ST 20 MIN: ICD-10-PCS | Mod: S$PBB,,, | Performed by: INTERNAL MEDICINE

## 2021-08-13 ENCOUNTER — DOCUMENT SCAN (OUTPATIENT)
Dept: HOME HEALTH SERVICES | Facility: HOSPITAL | Age: 76
End: 2021-08-13
Payer: MEDICARE

## 2021-08-17 DIAGNOSIS — I10 ESSENTIAL HYPERTENSION: Primary | ICD-10-CM

## 2021-08-17 DIAGNOSIS — E78.5 HYPERLIPIDEMIA, UNSPECIFIED HYPERLIPIDEMIA TYPE: ICD-10-CM

## 2021-08-18 RX ORDER — ATORVASTATIN CALCIUM 10 MG/1
TABLET, FILM COATED ORAL
Qty: 90 TABLET | Refills: 0 | Status: SHIPPED | OUTPATIENT
Start: 2021-08-18 | End: 2021-11-16

## 2021-08-19 ENCOUNTER — PATIENT MESSAGE (OUTPATIENT)
Dept: INTERVENTIONAL RADIOLOGY/VASCULAR | Facility: OTHER | Age: 76
End: 2021-08-19

## 2021-08-19 ENCOUNTER — TELEPHONE (OUTPATIENT)
Dept: INTERVENTIONAL RADIOLOGY/VASCULAR | Facility: OTHER | Age: 76
End: 2021-08-19

## 2021-08-19 DIAGNOSIS — J47.9 BRONCHIECTASIS WITHOUT COMPLICATION: ICD-10-CM

## 2021-08-19 DIAGNOSIS — A31.0 MYCOBACTERIAL DISEASE, PULMONARY: Primary | ICD-10-CM

## 2021-08-20 ENCOUNTER — HOSPITAL ENCOUNTER (OUTPATIENT)
Facility: OTHER | Age: 76
Discharge: HOME | End: 2021-08-20
Attending: RADIOLOGY | Admitting: RADIOLOGY
Payer: MEDICARE

## 2021-08-20 VITALS
WEIGHT: 123 LBS | TEMPERATURE: 98 F | BODY MASS INDEX: 20.49 KG/M2 | HEIGHT: 65 IN | SYSTOLIC BLOOD PRESSURE: 165 MMHG | OXYGEN SATURATION: 98 % | RESPIRATION RATE: 18 BRPM | DIASTOLIC BLOOD PRESSURE: 71 MMHG | HEART RATE: 71 BPM

## 2021-08-20 DIAGNOSIS — J47.9 BRONCHIECTASIS WITHOUT COMPLICATION: ICD-10-CM

## 2021-08-20 DIAGNOSIS — A31.0 MYCOBACTERIAL DISEASE, PULMONARY: ICD-10-CM

## 2021-08-20 DIAGNOSIS — T82.9XXA COMPLICATION ASSOCIATED WITH PERIPHERALLY INSERTED CENTRAL CATHETER (PICC): ICD-10-CM

## 2021-08-20 LAB — SARS-COV-2 RDRP RESP QL NAA+PROBE: NEGATIVE

## 2021-08-20 PROCEDURE — C1751 CATH, INF, PER/CENT/MIDLINE: HCPCS | Performed by: RADIOLOGY

## 2021-08-20 PROCEDURE — C1894 INTRO/SHEATH, NON-LASER: HCPCS | Performed by: RADIOLOGY

## 2021-08-20 PROCEDURE — U0002 COVID-19 LAB TEST NON-CDC: HCPCS | Performed by: RADIOLOGY

## 2021-08-20 PROCEDURE — C1769 GUIDE WIRE: HCPCS | Performed by: RADIOLOGY

## 2021-08-31 ENCOUNTER — EXTERNAL CHRONIC CARE MANAGEMENT (OUTPATIENT)
Dept: PRIMARY CARE CLINIC | Facility: CLINIC | Age: 76
End: 2021-08-31
Payer: MEDICARE

## 2021-08-31 PROCEDURE — 99490 PR CHRONIC CARE MGMT, 1ST 20 MIN: ICD-10-PCS | Mod: S$PBB,,, | Performed by: INTERNAL MEDICINE

## 2021-08-31 PROCEDURE — 99490 CHRNC CARE MGMT STAFF 1ST 20: CPT | Mod: S$PBB,,, | Performed by: INTERNAL MEDICINE

## 2021-08-31 PROCEDURE — 99490 CHRNC CARE MGMT STAFF 1ST 20: CPT | Mod: PBBFAC | Performed by: INTERNAL MEDICINE

## 2021-09-30 ENCOUNTER — EXTERNAL CHRONIC CARE MANAGEMENT (OUTPATIENT)
Dept: PRIMARY CARE CLINIC | Facility: CLINIC | Age: 76
End: 2021-09-30
Payer: MEDICARE

## 2021-09-30 PROCEDURE — 99490 CHRNC CARE MGMT STAFF 1ST 20: CPT | Mod: S$PBB,,, | Performed by: INTERNAL MEDICINE

## 2021-09-30 PROCEDURE — 99490 PR CHRONIC CARE MGMT, 1ST 20 MIN: ICD-10-PCS | Mod: S$PBB,,, | Performed by: INTERNAL MEDICINE

## 2021-09-30 PROCEDURE — 99490 CHRNC CARE MGMT STAFF 1ST 20: CPT | Mod: PBBFAC | Performed by: INTERNAL MEDICINE

## 2021-10-01 ENCOUNTER — EXTERNAL HOME HEALTH (OUTPATIENT)
Dept: HOME HEALTH SERVICES | Facility: HOSPITAL | Age: 76
End: 2021-10-01
Payer: MEDICARE

## 2021-10-31 ENCOUNTER — EXTERNAL CHRONIC CARE MANAGEMENT (OUTPATIENT)
Dept: PRIMARY CARE CLINIC | Facility: CLINIC | Age: 76
End: 2021-10-31
Payer: MEDICARE

## 2021-10-31 PROCEDURE — 99490 CHRNC CARE MGMT STAFF 1ST 20: CPT | Mod: PBBFAC | Performed by: INTERNAL MEDICINE

## 2021-10-31 PROCEDURE — 99490 CHRNC CARE MGMT STAFF 1ST 20: CPT | Mod: S$PBB,,, | Performed by: INTERNAL MEDICINE

## 2021-10-31 PROCEDURE — 99490 PR CHRONIC CARE MGMT, 1ST 20 MIN: ICD-10-PCS | Mod: S$PBB,,, | Performed by: INTERNAL MEDICINE

## 2021-11-16 DIAGNOSIS — E78.5 HYPERLIPIDEMIA, UNSPECIFIED HYPERLIPIDEMIA TYPE: ICD-10-CM

## 2021-11-16 RX ORDER — ATORVASTATIN CALCIUM 10 MG/1
TABLET, FILM COATED ORAL
Qty: 90 TABLET | Refills: 3 | Status: SHIPPED | OUTPATIENT
Start: 2021-11-16 | End: 2022-06-29 | Stop reason: SDUPTHER

## 2021-11-19 ENCOUNTER — PATIENT MESSAGE (OUTPATIENT)
Dept: INTERNAL MEDICINE | Facility: CLINIC | Age: 76
End: 2021-11-19
Payer: MEDICARE

## 2021-11-19 DIAGNOSIS — L72.9 CYST OF SKIN: Primary | ICD-10-CM

## 2021-11-30 ENCOUNTER — EXTERNAL CHRONIC CARE MANAGEMENT (OUTPATIENT)
Dept: PRIMARY CARE CLINIC | Facility: CLINIC | Age: 76
End: 2021-11-30
Payer: MEDICARE

## 2021-11-30 PROCEDURE — 99490 PR CHRONIC CARE MGMT, 1ST 20 MIN: ICD-10-PCS | Mod: S$PBB,,, | Performed by: INTERNAL MEDICINE

## 2021-11-30 PROCEDURE — 99490 CHRNC CARE MGMT STAFF 1ST 20: CPT | Mod: PBBFAC | Performed by: INTERNAL MEDICINE

## 2021-11-30 PROCEDURE — 99490 CHRNC CARE MGMT STAFF 1ST 20: CPT | Mod: S$PBB,,, | Performed by: INTERNAL MEDICINE

## 2021-12-27 ENCOUNTER — PATIENT MESSAGE (OUTPATIENT)
Dept: INTERNAL MEDICINE | Facility: CLINIC | Age: 76
End: 2021-12-27
Payer: MEDICARE

## 2021-12-27 DIAGNOSIS — R05.9 COUGH: ICD-10-CM

## 2021-12-27 DIAGNOSIS — Z20.822 EXPOSURE TO COVID-19 VIRUS: Primary | ICD-10-CM

## 2021-12-27 NOTE — TELEPHONE ENCOUNTER
The was the second message    I forgot to mention I had to take cough medicine last night, which I havent had to do for the last 5 months because of the IV meds Im on.  I am still coughing this morning but otherwise feel okay.  I dont know if this is indicative of anything!  Praying its not Covid!    PCR pended

## 2021-12-28 ENCOUNTER — PATIENT MESSAGE (OUTPATIENT)
Dept: ADMINISTRATIVE | Facility: OTHER | Age: 76
End: 2021-12-28
Payer: MEDICARE

## 2021-12-28 ENCOUNTER — PATIENT MESSAGE (OUTPATIENT)
Dept: INTERNAL MEDICINE | Facility: CLINIC | Age: 76
End: 2021-12-28
Payer: MEDICARE

## 2021-12-29 ENCOUNTER — OFFICE VISIT (OUTPATIENT)
Dept: INTERNAL MEDICINE | Facility: CLINIC | Age: 76
End: 2021-12-29
Attending: INTERNAL MEDICINE
Payer: MEDICARE

## 2021-12-29 DIAGNOSIS — A31.0 MYCOBACTERIAL DISEASE, PULMONARY: ICD-10-CM

## 2021-12-29 DIAGNOSIS — M85.80 OSTEOPENIA, UNSPECIFIED LOCATION: ICD-10-CM

## 2021-12-29 DIAGNOSIS — E89.0 S/P PARTIAL THYROIDECTOMY: ICD-10-CM

## 2021-12-29 DIAGNOSIS — E55.9 VITAMIN D DEFICIENCY DISEASE: ICD-10-CM

## 2021-12-29 DIAGNOSIS — I70.0 ATHEROSCLEROSIS OF AORTA: ICD-10-CM

## 2021-12-29 DIAGNOSIS — I10 HYPERTENSION, UNSPECIFIED TYPE: Primary | ICD-10-CM

## 2021-12-29 PROCEDURE — 99214 OFFICE O/P EST MOD 30 MIN: CPT | Mod: 95,,, | Performed by: INTERNAL MEDICINE

## 2021-12-29 PROCEDURE — 99214 PR OFFICE/OUTPT VISIT, EST, LEVL IV, 30-39 MIN: ICD-10-PCS | Mod: 95,,, | Performed by: INTERNAL MEDICINE

## 2021-12-30 ENCOUNTER — TELEPHONE (OUTPATIENT)
Dept: INTERNAL MEDICINE | Facility: CLINIC | Age: 76
End: 2021-12-30
Payer: MEDICARE

## 2021-12-30 DIAGNOSIS — U07.1 COVID-19: Primary | ICD-10-CM

## 2021-12-30 PROBLEM — I51.89 DIASTOLIC DYSFUNCTION: Status: RESOLVED | Noted: 2021-06-29 | Resolved: 2021-12-30

## 2021-12-31 ENCOUNTER — EXTERNAL CHRONIC CARE MANAGEMENT (OUTPATIENT)
Dept: PRIMARY CARE CLINIC | Facility: CLINIC | Age: 76
End: 2021-12-31
Payer: MEDICARE

## 2021-12-31 PROCEDURE — 99490 CHRNC CARE MGMT STAFF 1ST 20: CPT | Mod: S$PBB,,, | Performed by: INTERNAL MEDICINE

## 2021-12-31 PROCEDURE — 99490 PR CHRONIC CARE MGMT, 1ST 20 MIN: ICD-10-PCS | Mod: S$PBB,,, | Performed by: INTERNAL MEDICINE

## 2021-12-31 PROCEDURE — 99490 CHRNC CARE MGMT STAFF 1ST 20: CPT | Mod: PBBFAC | Performed by: INTERNAL MEDICINE

## 2022-02-23 DIAGNOSIS — D84.9 IMMUNOSUPPRESSED STATUS: ICD-10-CM

## 2022-02-28 ENCOUNTER — EXTERNAL CHRONIC CARE MANAGEMENT (OUTPATIENT)
Dept: PRIMARY CARE CLINIC | Facility: CLINIC | Age: 77
End: 2022-02-28
Payer: MEDICARE

## 2022-02-28 PROCEDURE — 99490 CHRNC CARE MGMT STAFF 1ST 20: CPT | Mod: S$PBB,,, | Performed by: INTERNAL MEDICINE

## 2022-02-28 PROCEDURE — 99490 CHRNC CARE MGMT STAFF 1ST 20: CPT | Mod: PBBFAC | Performed by: INTERNAL MEDICINE

## 2022-02-28 PROCEDURE — 99490 PR CHRONIC CARE MGMT, 1ST 20 MIN: ICD-10-PCS | Mod: S$PBB,,, | Performed by: INTERNAL MEDICINE

## 2022-03-30 ENCOUNTER — PATIENT MESSAGE (OUTPATIENT)
Dept: INTERNAL MEDICINE | Facility: CLINIC | Age: 77
End: 2022-03-30
Payer: MEDICARE

## 2022-03-31 ENCOUNTER — EXTERNAL CHRONIC CARE MANAGEMENT (OUTPATIENT)
Dept: PRIMARY CARE CLINIC | Facility: CLINIC | Age: 77
End: 2022-03-31
Payer: MEDICARE

## 2022-03-31 PROCEDURE — 99490 PR CHRONIC CARE MGMT, 1ST 20 MIN: ICD-10-PCS | Mod: S$PBB,,, | Performed by: INTERNAL MEDICINE

## 2022-03-31 PROCEDURE — 99490 CHRNC CARE MGMT STAFF 1ST 20: CPT | Mod: PBBFAC | Performed by: INTERNAL MEDICINE

## 2022-03-31 PROCEDURE — 99490 CHRNC CARE MGMT STAFF 1ST 20: CPT | Mod: S$PBB,,, | Performed by: INTERNAL MEDICINE

## 2022-04-01 DIAGNOSIS — J47.9 BRONCHIECTASIS: Primary | ICD-10-CM

## 2022-04-22 ENCOUNTER — HOSPITAL ENCOUNTER (OUTPATIENT)
Dept: PULMONOLOGY | Facility: OTHER | Age: 77
Discharge: HOME | End: 2022-04-22
Attending: INTERNAL MEDICINE
Payer: MEDICARE

## 2022-04-22 VITALS — HEIGHT: 65 IN | WEIGHT: 123 LBS | BODY MASS INDEX: 20.49 KG/M2

## 2022-04-22 DIAGNOSIS — J47.9 BRONCHIECTASIS: ICD-10-CM

## 2022-04-22 DIAGNOSIS — J47.9 BRONCHIECTASIS: Primary | ICD-10-CM

## 2022-04-22 PROCEDURE — 94618 PULMONARY STRESS TESTING: CPT

## 2022-04-22 NOTE — PROGRESS NOTES
"[facility]  Six Minute Walk     SUMMARY     Ordering Provider: Tad Taylor      Performing nurse/tech/RT: SID Lockwood RRT  Diagnosis: Shortness of Breath  Height: 5' 5" (165.1 cm)  Weight: 55.8 kg (123 lb)  BMI (Calculated): 20.5   Patient Race:             Phase Oxygen Assessment Supplemental O2 Heart   Rate Blood Pressure Ghislaine Dyspnea Scale Rating   Resting 95 % Room Air 80 bpm 136/82 0   Exercise        Minute        1           2           3           4           5           6  89 %   113 bpm 174/85 5-6   Recovery        Minute        1           2           3           4 98 %   77 bpm 136/76 1     Six Minute Walk Summary  6MWT Status: completed without stopping  Patient Reported: No complaints   Distance: 1778 ft.     Interpretation:                                                     Predicted Distance Meters (Calculated): 459.88 meters                        "

## 2022-04-30 ENCOUNTER — EXTERNAL CHRONIC CARE MANAGEMENT (OUTPATIENT)
Dept: PRIMARY CARE CLINIC | Facility: CLINIC | Age: 77
End: 2022-04-30
Payer: MEDICARE

## 2022-04-30 PROCEDURE — 99490 PR CHRONIC CARE MGMT, 1ST 20 MIN: ICD-10-PCS | Mod: S$PBB,,, | Performed by: INTERNAL MEDICINE

## 2022-04-30 PROCEDURE — 99490 CHRNC CARE MGMT STAFF 1ST 20: CPT | Mod: PBBFAC | Performed by: INTERNAL MEDICINE

## 2022-04-30 PROCEDURE — 99490 CHRNC CARE MGMT STAFF 1ST 20: CPT | Mod: S$PBB,,, | Performed by: INTERNAL MEDICINE

## 2022-06-27 ENCOUNTER — PES CALL (OUTPATIENT)
Dept: ADMINISTRATIVE | Facility: CLINIC | Age: 77
End: 2022-06-27
Payer: MEDICARE

## 2022-06-29 ENCOUNTER — OFFICE VISIT (OUTPATIENT)
Dept: INTERNAL MEDICINE | Facility: CLINIC | Age: 77
End: 2022-06-29
Attending: INTERNAL MEDICINE
Payer: MEDICARE

## 2022-06-29 ENCOUNTER — PATIENT MESSAGE (OUTPATIENT)
Dept: INTERNAL MEDICINE | Facility: CLINIC | Age: 77
End: 2022-06-29

## 2022-06-29 VITALS
HEART RATE: 67 BPM | HEIGHT: 65 IN | OXYGEN SATURATION: 99 % | WEIGHT: 129.88 LBS | BODY MASS INDEX: 21.64 KG/M2 | DIASTOLIC BLOOD PRESSURE: 72 MMHG | SYSTOLIC BLOOD PRESSURE: 120 MMHG

## 2022-06-29 DIAGNOSIS — E78.5 HYPERLIPIDEMIA, UNSPECIFIED HYPERLIPIDEMIA TYPE: ICD-10-CM

## 2022-06-29 DIAGNOSIS — N95.9 MENOPAUSAL PROBLEM: ICD-10-CM

## 2022-06-29 DIAGNOSIS — L65.9 HAIR LOSS: ICD-10-CM

## 2022-06-29 DIAGNOSIS — I10 HYPERTENSION, UNSPECIFIED TYPE: ICD-10-CM

## 2022-06-29 DIAGNOSIS — E55.9 VITAMIN D DEFICIENCY DISEASE: ICD-10-CM

## 2022-06-29 DIAGNOSIS — A31.0 MYCOBACTERIAL DISEASE, PULMONARY: ICD-10-CM

## 2022-06-29 DIAGNOSIS — R00.2 PALPITATIONS: ICD-10-CM

## 2022-06-29 DIAGNOSIS — E89.0 S/P PARTIAL THYROIDECTOMY: Primary | ICD-10-CM

## 2022-06-29 PROBLEM — T82.9XXA COMPLICATION ASSOCIATED WITH PERIPHERALLY INSERTED CENTRAL CATHETER (PICC): Status: RESOLVED | Noted: 2021-08-20 | Resolved: 2022-06-29

## 2022-06-29 PROCEDURE — 99999 PR PBB SHADOW E&M-EST. PATIENT-LVL IV: ICD-10-PCS | Mod: PBBFAC,,, | Performed by: INTERNAL MEDICINE

## 2022-06-29 PROCEDURE — 99214 OFFICE O/P EST MOD 30 MIN: CPT | Mod: PBBFAC | Performed by: INTERNAL MEDICINE

## 2022-06-29 PROCEDURE — 99214 OFFICE O/P EST MOD 30 MIN: CPT | Mod: S$PBB,,, | Performed by: INTERNAL MEDICINE

## 2022-06-29 PROCEDURE — 99214 PR OFFICE/OUTPT VISIT, EST, LEVL IV, 30-39 MIN: ICD-10-PCS | Mod: S$PBB,,, | Performed by: INTERNAL MEDICINE

## 2022-06-29 PROCEDURE — 99999 PR PBB SHADOW E&M-EST. PATIENT-LVL IV: CPT | Mod: PBBFAC,,, | Performed by: INTERNAL MEDICINE

## 2022-06-29 RX ORDER — AZITHROMYCIN 250 MG/1
250 TABLET, FILM COATED ORAL
COMMUNITY
Start: 2022-05-09 | End: 2022-10-24

## 2022-06-29 RX ORDER — ATORVASTATIN CALCIUM 10 MG/1
10 TABLET, FILM COATED ORAL DAILY
Qty: 90 TABLET | Refills: 3 | Status: SHIPPED | OUTPATIENT
Start: 2022-06-29 | End: 2023-01-10

## 2022-06-29 RX ORDER — METOPROLOL SUCCINATE 50 MG/1
50 TABLET, EXTENDED RELEASE ORAL DAILY
Qty: 90 TABLET | Refills: 3 | Status: SHIPPED | OUTPATIENT
Start: 2022-06-29 | End: 2023-02-07 | Stop reason: SDUPTHER

## 2022-06-29 RX ORDER — AMIKACIN SULFATE 250 MG/ML
INJECTION, SOLUTION INTRAMUSCULAR; INTRAVENOUS
COMMUNITY
Start: 2022-01-17 | End: 2023-06-07

## 2022-06-29 NOTE — PROGRESS NOTES
Subjective:   Patient ID: Marly Maciel is a 76 y.o. female  Chief complaint:   Chief Complaint   Patient presents with    Annual Exam       HPI    Here for 6 month f/u of hypothyroidism:    Bronchiectasis and pulm MAC and pulm pseudomonas colonization previously followed by pulm/ID specialists in [place] - Dr. Brayden Marks in [place], [place] and was taking azithromycin 250mg daily at baseline now s/p IV abx for this and followed by Dr. Taylor at [facility] pulm:  Today: going to [place] to see specialist Dr. Santana 7/24/2022 - now at [facility] to see if can be enrolled in clinical trial   - tried mult meds - iv and po and on chronic azithro and clofazimine  - lung cavity lesion inc in size slightly  Previously:    - Completed IV abx for pulm Mac through [facility] clinic  Did not sunshine PO meds after IV meds completed 2/2 to severe sob and other sx   Cultures neg for AFB Oct and Nov 2019 and CT scan cleared   - per pt she reports that she called pulm specialiasts and attrib sx to combo fo oral meds   - then taking clofazimine and azithro - d/c'd after had SE   - then started having tachycardia and severe العراقي   - had EKG - normal - and echo EF 61% and grade 1 DD  - had lexiscan 11/2019 and negative   - started on metoprolol 25mg and seen by Dr. Raymond and inc to 50mg   Around [place] when she saw her son who is a cardiologist she stopped all abx and all sx resolved   At lcv:   - has been receiving IV abx again for 6 months after CT scan at [facility] through her pulmonologist Dr. Ibrahim showed worsening RUPA findings and she has f/u with her pulmonologist soon to discuss plan moving forward  - Ct 6/15/2021  - had telemed visit 11/19/2021  - today reports feeling well with no change in baseline symptom of cough or in any fevers    HTN, hx of palpitations:   - now taking metoprolol 50 mg daily - started by cardiologist when seen for palpitations - sx well controlled and discussed that I can send in refills and take over rx    - prev was taking losartan and occ lightheaded when stood quickly and it was stopped     Hypothyroidism, hx of hashimotos and hyperparathyroidism dx 2/2 hyperCa diagnosed and treated with surgery 4/8/2014 at [facility] and s/p parathyroidectomy and partial thyroidectomy:  - tsh at goal 7/2021  - not requiring levothyroxine at this time      Osteopenia:   - taking caltrate, biotin, and MVI   - Dexa 2/12/2020    Aortic ectasia - ascending aorta - seen on CT report 7/2019 from [place] and reviewed last few CT scans - none seen on recent CT scan this year   - bp controlled   - sunshine statin   - no cp     Macular degeneration:   - followed by Dr. Nascimento - on eye supplement     HLD:  - tolerating Lipitor    HM:   - due for shingrix vaccine  - mmg utd 6/2/2022 - ordered by Dr. Morse - seeing annually and mmg stable ans has appt to follow up next week   - had covid booster 4/4/2022    Cervical DJD:   Attended PT   Followed by ortho - seen by ortho after PT at [facility]  Had CT scan of neck and went to receive injection - not improved and managing at this time    Noticed more hair loss recently - taking biotin suppl   not on oral iron      Review of Systems   Constitutional: Negative for activity change and unexpected weight change.   HENT: Negative for hearing loss, rhinorrhea and trouble swallowing.    Eyes: Negative for discharge and visual disturbance.   Respiratory: Negative for chest tightness and wheezing.    Cardiovascular: Negative for chest pain and palpitations.   Gastrointestinal: Negative for blood in stool, constipation, diarrhea and vomiting.   Endocrine: Negative for polydipsia and polyuria.   Genitourinary: Negative for difficulty urinating, dysuria, hematuria and menstrual problem.   Musculoskeletal: Negative for arthralgias, joint swelling and neck pain.   Neurological: Negative for weakness and headaches.   Psychiatric/Behavioral: Negative for confusion and dysphoric mood.  "      Objective:  Vitals:    06/29/22 0854   BP: 120/72   BP Location: Left arm   Patient Position: Sitting   Pulse: 67   SpO2: 99%   Weight: 58.9 kg (129 lb 13.6 oz)   Height: 5' 5" (1.651 m)     Body mass index is 21.61 kg/m².    Physical Exam  Vitals reviewed.   Constitutional:       General: She is not in acute distress.     Appearance: Normal appearance. She is well-developed. She is not diaphoretic.   HENT:      Head: Normocephalic and atraumatic.      Right Ear: Tympanic membrane, ear canal and external ear normal.      Left Ear: Tympanic membrane, ear canal and external ear normal.      Nose: Nose normal. No congestion.      Mouth/Throat:      Mouth: Mucous membranes are moist.      Pharynx: Oropharynx is clear. No oropharyngeal exudate.   Eyes:      General:         Right eye: No discharge.         Left eye: No discharge.      Extraocular Movements: Extraocular movements intact.      Conjunctiva/sclera: Conjunctivae normal.   Neck:      Thyroid: No thyromegaly.   Cardiovascular:      Rate and Rhythm: Normal rate and regular rhythm.      Pulses: Normal pulses.      Heart sounds: Normal heart sounds.   Pulmonary:      Effort: Pulmonary effort is normal. No respiratory distress.   Abdominal:      General: Bowel sounds are normal.      Palpations: Abdomen is soft.   Musculoskeletal:         General: No swelling or tenderness.      Cervical back: Neck supple.   Lymphadenopathy:      Cervical: No cervical adenopathy.   Skin:     General: Skin is warm and dry.      Capillary Refill: Capillary refill takes less than 2 seconds.      Findings: No erythema or rash.   Neurological:      General: No focal deficit present.      Mental Status: She is alert and oriented to person, place, and time. Mental status is at baseline.   Psychiatric:         Behavior: Behavior normal.         Thought Content: Thought content normal.         Judgment: Judgment normal.         Assessment:  1. S/P partial thyroidectomy    2. " Hyperlipidemia, unspecified hyperlipidemia type    3. Hypertension, unspecified type    4. Menopausal problem    5. Palpitations    6. Hair loss    7. Vitamin D deficiency disease    8. Mycobacterial disease, pulmonary        Plan:  Marly was seen today for annual exam.    Diagnoses and all orders for this visit:    S/P partial thyroidectomy  -     TSH; Future  Stable - due for tsh     Hyperlipidemia, unspecified hyperlipidemia type  -     Lipid Panel; Future  -     atorvastatin (LIPITOR) 10 MG tablet; Take 1 tablet (10 mg total) by mouth once daily.  Stable   Cont med and check labs     Hypertension, unspecified type  Stable on med - cont     Menopausal problem  -     DXA Bone Density Spine And Hip; Future    Palpitations  -     metoprolol succinate (TOPROL-XL) 50 MG 24 hr tablet; Take 1 tablet (50 mg total) by mouth once daily.    Hair loss  -     Ferritin; Future  -     Iron and TIBC; Future  -     Vitam B7, H (Biotin); Future  Check tsh     Vitamin D deficiency disease  -     Vitamin D; Future    Mycobacterial disease, pulmonary  Followed by specialists     Cont current meds and supplements  Follow-up with specialists   Labs at [facility]   Topic Date Due    Mammogram  06/01/2022    DEXA Scan  02/12/2023    High Dose Statin  06/29/2023    TETANUS VACCINE  06/14/2026    Lipid Panel  07/13/2026    Hepatitis C Screening  Completed

## 2022-07-05 ENCOUNTER — LAB VISIT (OUTPATIENT)
Dept: LAB | Facility: HOSPITAL | Age: 77
End: 2022-07-05
Attending: INTERNAL MEDICINE
Payer: MEDICARE

## 2022-07-05 DIAGNOSIS — E78.5 HYPERLIPIDEMIA, UNSPECIFIED HYPERLIPIDEMIA TYPE: ICD-10-CM

## 2022-07-05 DIAGNOSIS — E89.0 S/P PARTIAL THYROIDECTOMY: ICD-10-CM

## 2022-07-05 DIAGNOSIS — L65.9 HAIR LOSS: ICD-10-CM

## 2022-07-05 DIAGNOSIS — E55.9 VITAMIN D DEFICIENCY DISEASE: ICD-10-CM

## 2022-07-05 LAB
25(OH)D3+25(OH)D2 SERPL-MCNC: 32 NG/ML (ref 30–96)
CHOLEST SERPL-MCNC: 135 MG/DL (ref 120–199)
CHOLEST/HDLC SERPL: 2 {RATIO} (ref 2–5)
FERRITIN SERPL-MCNC: 273 NG/ML (ref 20–300)
HDLC SERPL-MCNC: 66 MG/DL (ref 40–75)
HDLC SERPL: 48.9 % (ref 20–50)
IRON SERPL-MCNC: 44 UG/DL (ref 30–160)
LDLC SERPL CALC-MCNC: 53.8 MG/DL (ref 63–159)
NONHDLC SERPL-MCNC: 69 MG/DL
SATURATED IRON: 16 % (ref 20–50)
TOTAL IRON BINDING CAPACITY: 269 UG/DL (ref 250–450)
TRANSFERRIN SERPL-MCNC: 182 MG/DL (ref 200–375)
TRIGL SERPL-MCNC: 76 MG/DL (ref 30–150)
TSH SERPL DL<=0.005 MIU/L-ACNC: 1.75 UIU/ML (ref 0.4–4)

## 2022-07-05 PROCEDURE — 82728 ASSAY OF FERRITIN: CPT | Performed by: INTERNAL MEDICINE

## 2022-07-05 PROCEDURE — 84466 ASSAY OF TRANSFERRIN: CPT | Performed by: INTERNAL MEDICINE

## 2022-07-05 PROCEDURE — 84591 ASSAY OF NOS VITAMIN: CPT | Mod: 59 | Performed by: INTERNAL MEDICINE

## 2022-07-05 PROCEDURE — 82306 VITAMIN D 25 HYDROXY: CPT | Performed by: INTERNAL MEDICINE

## 2022-07-05 PROCEDURE — 84443 ASSAY THYROID STIM HORMONE: CPT | Performed by: INTERNAL MEDICINE

## 2022-07-05 PROCEDURE — 80061 LIPID PANEL: CPT | Performed by: INTERNAL MEDICINE

## 2022-07-05 PROCEDURE — 36415 COLL VENOUS BLD VENIPUNCTURE: CPT | Mod: PN | Performed by: INTERNAL MEDICINE

## 2022-07-06 ENCOUNTER — HOSPITAL ENCOUNTER (OUTPATIENT)
Dept: RADIOLOGY | Facility: OTHER | Age: 77
Discharge: HOME | End: 2022-07-06
Attending: INTERNAL MEDICINE
Payer: MEDICARE

## 2022-07-06 DIAGNOSIS — N95.9 MENOPAUSAL PROBLEM: ICD-10-CM

## 2022-07-06 PROCEDURE — 77080 DXA BONE DENSITY AXIAL: CPT | Mod: 26,,, | Performed by: RADIOLOGY

## 2022-07-06 PROCEDURE — 77080 DXA BONE DENSITY AXIAL: CPT | Mod: TC

## 2022-07-06 PROCEDURE — 77080 DEXA BONE DENSITY SPINE HIP: ICD-10-PCS | Mod: 26,,, | Performed by: RADIOLOGY

## 2022-07-13 LAB — BIOTIN SERPL-SCNC: 1548.9 PG/ML (ref 221–3004)

## 2022-07-29 ENCOUNTER — PATIENT MESSAGE (OUTPATIENT)
Dept: INTERNAL MEDICINE | Facility: CLINIC | Age: 77
End: 2022-07-29
Payer: MEDICARE

## 2022-07-29 DIAGNOSIS — A31.0 PULMONARY MYCOBACTERIUM AVIUM COMPLEX (MAC) INFECTION: Primary | ICD-10-CM

## 2022-07-29 DIAGNOSIS — Z79.2 LONG TERM (CURRENT) USE OF ANTIBIOTICS: ICD-10-CM

## 2022-08-02 NOTE — TELEPHONE ENCOUNTER
Reviewed case with IR via secure chat to discuss picc vs port - rec to order IR consult and order for IR tunneled catheter   - informed pt of orders signed and that IR staff will review case so best line of access can be determined and scheduled   All questions were answered and pt verbalized understanding of plan.

## 2022-08-02 NOTE — TELEPHONE ENCOUNTER
----- Message from Herlinda Wright sent at 8/2/2022  8:23 AM CDT -----  Regarding: self [phone]  .Type:  Patient Returning Call    Who Called: self     Who Left Message for Patient:  nurse     Does the patient know what this is regarding? yes  Would the patient rather a call back or a response via [facility]?  Call back     Best Call Back Number: .[phone]

## 2022-08-02 NOTE — TELEPHONE ENCOUNTER
Patient will like to inform Dr. Pereira for her message below. Please see message and its entirety. Routed to Dr. Pereira for further instructions. Thanks.       Dr Pereira, I am in [place] [facility], Ive been here all week doing testing for my lungs.  Apparently I need a chest wall catheter for the IV antibiotics and possible surgery at a later date.  Can you order the catheter procedure for me at [facility]?  I dont want to have it done at [facility] and Dr Taylor does not have privileges at [facility].  If this is an inconvenience or inappropriate I certainly understand.  Dr Marks wants the procedure done as soon as possible so I can begin the drug regimen.  I fly home tomorrow and can be ready for procedure any time next week.  If you want further information please call me at [phone]  FYI:  Dr Marks may be calling you about this.  Thank you for your consideration.  Marly

## 2022-08-11 ENCOUNTER — TELEPHONE (OUTPATIENT)
Dept: INTERNAL MEDICINE | Facility: CLINIC | Age: 77
End: 2022-08-11
Payer: MEDICARE

## 2022-08-11 ENCOUNTER — HOSPITAL ENCOUNTER (OUTPATIENT)
Facility: OTHER | Age: 77
Discharge: HOME | End: 2022-08-11
Attending: RADIOLOGY | Admitting: RADIOLOGY
Payer: MEDICARE

## 2022-08-11 VITALS
DIASTOLIC BLOOD PRESSURE: 66 MMHG | OXYGEN SATURATION: 97 % | HEIGHT: 65 IN | SYSTOLIC BLOOD PRESSURE: 132 MMHG | TEMPERATURE: 98 F | RESPIRATION RATE: 16 BRPM | BODY MASS INDEX: 21.49 KG/M2 | WEIGHT: 129 LBS | HEART RATE: 57 BPM

## 2022-08-11 DIAGNOSIS — A31.9 MYCOBACTERIA, ATYPICAL: ICD-10-CM

## 2022-08-11 DIAGNOSIS — A15.9 ATYPICAL TB: ICD-10-CM

## 2022-08-11 DIAGNOSIS — A31.0 PULMONARY MYCOBACTERIUM AVIUM COMPLEX (MAC) INFECTION: ICD-10-CM

## 2022-08-11 DIAGNOSIS — Z79.2 LONG TERM (CURRENT) USE OF ANTIBIOTICS: ICD-10-CM

## 2022-08-11 PROCEDURE — C1894 INTRO/SHEATH, NON-LASER: HCPCS | Performed by: RADIOLOGY

## 2022-08-11 PROCEDURE — C1751 CATH, INF, PER/CENT/MIDLINE: HCPCS | Performed by: RADIOLOGY

## 2022-08-11 PROCEDURE — 99152 MOD SED SAME PHYS/QHP 5/>YRS: CPT | Performed by: RADIOLOGY

## 2022-08-11 PROCEDURE — 63600175 PHARM REV CODE 636 W HCPCS: Performed by: RADIOLOGY

## 2022-08-11 PROCEDURE — 25000003 PHARM REV CODE 250: Performed by: RADIOLOGY

## 2022-08-11 RX ORDER — CEFAZOLIN SODIUM 1 G/50ML
SOLUTION INTRAVENOUS
Status: DISCONTINUED | OUTPATIENT
Start: 2022-08-11 | End: 2022-08-11 | Stop reason: HOSPADM

## 2022-08-11 RX ORDER — FENTANYL CITRATE 50 UG/ML
INJECTION, SOLUTION INTRAMUSCULAR; INTRAVENOUS
Status: DISCONTINUED | OUTPATIENT
Start: 2022-08-11 | End: 2022-08-11 | Stop reason: HOSPADM

## 2022-08-11 RX ORDER — MIDAZOLAM HYDROCHLORIDE 1 MG/ML
INJECTION INTRAMUSCULAR; INTRAVENOUS
Status: DISCONTINUED | OUTPATIENT
Start: 2022-08-11 | End: 2022-08-11 | Stop reason: HOSPADM

## 2022-08-11 RX ORDER — LIDOCAINE HYDROCHLORIDE 10 MG/ML
INJECTION INFILTRATION; PERINEURAL
Status: DISCONTINUED | OUTPATIENT
Start: 2022-08-11 | End: 2022-08-11 | Stop reason: HOSPADM

## 2022-08-11 NOTE — DISCHARGE SUMMARY
Radiology Discharge Summary      Hospital Course: No complications    Admit Date: 8/11/2022  Discharge Date: 08/11/2022     Instructions Given to Patient: Yes  Diet: Resume prior diet  Activity: activity as tolerated and no driving for today    Description of Condition on Discharge: Stable  Vital Signs (Most Recent): Temp: 97.8 °F (36.6 °C) (08/11/22 0922)  Pulse: 65 (08/11/22 1030)  Resp: 16 (08/11/22 1030)  BP: (!) 168/70 (08/11/22 1030)  SpO2: 99 % (08/11/22 1030)    Discharge Disposition: Home    Discharge Diagnosis: MAC     Follow-up: per ID, PICC ready for use    @SIG@

## 2022-08-11 NOTE — TELEPHONE ENCOUNTER
----- Message from Vince Arvizu sent at 8/11/2022  4:20 PM CDT -----  Regarding: Physician Call  Contact: Dr. Brayden Buck ([place])    Name of Who is Calling: Dr. Brayden Buck ([place])      What is the request in detail: Would like to speak with Dr. Pereira in regards to mutual patient.       Can the clinic reply by [facility]: no      What Number to Call Back if not in [facility]: [phone]

## 2022-08-11 NOTE — H&P
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     Past Medical History:   Diagnosis Date    Hashimoto's thyroiditis     Hyperlipidemia     Hypertension     Internal hemorrhoids     Osteopenia      Past Surgical History:   Procedure Laterality Date    APPENDECTOMY  1960    CHOLECYSTECTOMY  1997    [facility]    ESOPHAGOGASTRODUODENOSCOPY  09/04/2018    [facility] - bx with gastric mucosa with mild chronic inflammation, neg for h/pylori    EYE SURGERY  2001    INSERTION OF TUNNELED CENTRAL VENOUS CATHETER (CVC) WITH SUBCUTANEOUS PORT Left 7/27/2021    Procedure: INSERTION, PORT-A-CATH;  Surgeon: Marv Pearl MD;  Location: [facility] CATH LAB;  Service: Radiology;  Laterality: Left;  PICC LINE PLACEMENT    INSERTION OF TUNNELED CENTRAL VENOUS CATHETER (CVC) WITH SUBCUTANEOUS PORT Right 8/20/2021    Procedure: INSERTION, PORT-A-CATH;  Surgeon: Marv Pearl MD;  Location: [facility] CATH LAB;  Service: Radiology;  Laterality: Right;  PICC CHECK/REPLACEMENT    partial parathyroidectomy Right     PERIPHERALLY INSERTED CENTRAL CATHETER INSERTION  5/7/2019    Procedure: Insertion-picc;  Surgeon: Kendrick Mccollum MD;  Location: [facility] CATH LAB;  Service: Radiology;;    REMOVAL OF PERIPHERALLY INSERTED CENTRAL CATHETER Left 8/20/2021    Procedure: Removal-catheter-picc;  Surgeon: Marv Pearl MD;  Location: [facility] CATH LAB;  Service: Radiology;  Laterality: Left;    TONSILLECTOMY, ADENOIDECTOMY  1963    TOTAL ABDOMINAL HYSTERECTOMY  1990    TUBAL LIGATION  1979     Family History   Problem Relation Age of Onset    Stroke Mother 86    Heart attack Father 67    Heart disease Father      Social History     Tobacco Use    Smoking status: Never Smoker    Smokeless tobacco: Never  Used   Substance Use Topics    Alcohol use: Yes     Alcohol/week: 1.0 standard drink     Types: 1 Glasses of wine per week     Comment: weekly    Drug use: No       Review of Systems:  Constitutional/General:No fever, chills, change in appetite or weight loss.  Hematological/Immuno: no known coagulopathies  Respiratory: positive for - cough  Cardiovascular: no chest pain  Gastrointestinal: no abdominal pain  Genito-Urinary: no dysuria  Musculoskeletal: negative  Skin: Negative for rash, itching, pigmentation changes, nail or hair changes.  Neurological: no TIA or stroke symptoms  Psychiatric: normal mood/affect, good insight/judgement      OBJECTIVE:     Vital Signs Range (Last 24H):  Temp:  [97.8 °F (36.6 °C)]   Pulse:  [79]   Resp:  [16]   BP: (151)/(71)   SpO2:  [99 %]     Physical Exam:  General- Patient alert and oriented x3 in NAD  ENT- PERRLA,  Neck- No masses  CV- Regular rate and rhythm  Resp-  No increased WOB  GI- Non tender/non-distended  Extrem- No cyanosis, clubbing, edema.   Derm- No rashes, masses, or lesions noted  Neuro-  No focal deficits noted.     Physical Exam  Body mass index is 21.47 kg/m².    Scheduled Meds:   Continuous Infusions:   PRN Meds:    Allergies:   Review of patient's allergies indicates:   Allergen Reactions    Adhesive Rash     Tape & bandaids  Tape & bandaids    Albuterol Other (See Comments)     faint    Ciprofloxacin Other (See Comments)     Central nervous system issues       Labs:  No results for input(s): INR in the last 168 hours.    Invalid input(s):  PT,  PTT  No results for input(s): WBC, HGB, HCT, MCV, PLT in the last 168 hours. No results for input(s): GLU, NA, K, CL, CO2, BUN, CREATININE, CALCIUM, MG, ALT, AST, ALBUMIN, BILITOT, BILIDIR in the last 168 hours.    Vitals (Most Recent):  Temp: 97.8 °F (36.6 °C) (08/11/22 0922)  Pulse: 79 (08/11/22 0922)  Resp: 16 (08/11/22 0922)  BP: (!) 151/71 (08/11/22 0922)  SpO2: 99 % (08/11/22 0922)    ASA: 2  Mallampati:  2    Consent obtained    ASSESSMENT/PLAN:     After discussion with patient and , we will plan to place a R chest tunneled PICC with moderate sedation.    I think that this will be easiest to manage noting that they are familiar with PICC care as they have had arm PICCs in the past and given that she will require multiple antibiotic infusions per day for 3 months.      There are no hospital problems to display for this patient.          Marv Pearl MD

## 2022-08-11 NOTE — TELEPHONE ENCOUNTER
Called and spoke to Dr. Marks - he will be contacting her pulm specialist Dr. Taylor at [facility] to cood abx through HH

## 2022-08-11 NOTE — PROCEDURES
Radiology Post-Procedure Note    Pre Op Diagnosis: MAC  Post Op Diagnosis: Same    Procedure: tunneled PICC placement    Procedure performed by: Marv Pearl MD    Written Informed Consent Obtained: Yes  Specimen Removed: NO  Estimated Blood Loss: Minimal    Findings:   R chest tunneled PICC, ready for use.    Patient tolerated procedure well.    @SIG@

## 2022-08-25 ENCOUNTER — PATIENT MESSAGE (OUTPATIENT)
Dept: INTERNAL MEDICINE | Facility: CLINIC | Age: 77
End: 2022-08-25
Payer: MEDICARE

## 2022-08-26 NOTE — TELEPHONE ENCOUNTER
I agree with monitoring this - if your counts worsen or do not improve in the next 2-4 weeks then please let me know   - I can also order labs on my end if you prefer to complete them at the [facility] lab early next week (to repeat the blood counts and check iron labs) - just let me know

## 2022-08-31 ENCOUNTER — EXTERNAL CHRONIC CARE MANAGEMENT (OUTPATIENT)
Dept: PRIMARY CARE CLINIC | Facility: CLINIC | Age: 77
End: 2022-08-31
Payer: MEDICARE

## 2022-08-31 PROCEDURE — 99490 CHRNC CARE MGMT STAFF 1ST 20: CPT | Mod: PBBFAC | Performed by: INTERNAL MEDICINE

## 2022-08-31 PROCEDURE — 99490 CHRNC CARE MGMT STAFF 1ST 20: CPT | Mod: S$PBB,,, | Performed by: INTERNAL MEDICINE

## 2022-08-31 PROCEDURE — 99490 PR CHRONIC CARE MGMT, 1ST 20 MIN: ICD-10-PCS | Mod: S$PBB,,, | Performed by: INTERNAL MEDICINE

## 2022-09-01 NOTE — TELEPHONE ENCOUNTER
Please thank her for the update!   Her hemoglobin and hematocrit levels improved when compared to those from her pulmonologist   - we will continue monitoring her counts as planned by her specialists   - I am suspect that those results from her pulmonologist out of town are falsely low but will keep monitoring

## 2022-09-19 ENCOUNTER — PATIENT MESSAGE (OUTPATIENT)
Dept: INTERNAL MEDICINE | Facility: CLINIC | Age: 77
End: 2022-09-19
Payer: MEDICARE

## 2022-09-19 RX ORDER — HYDROCHLOROTHIAZIDE 25 MG/1
25 TABLET ORAL DAILY
COMMUNITY
End: 2022-10-24 | Stop reason: SDUPTHER

## 2022-09-19 RX ORDER — LOSARTAN POTASSIUM 50 MG/1
50 TABLET ORAL DAILY
Qty: 90 TABLET | Refills: 3 | Status: SHIPPED | OUTPATIENT
Start: 2022-09-19 | End: 2022-10-13

## 2022-09-19 NOTE — TELEPHONE ENCOUNTER
Ok to start losartan 50 mg to be taken nightly. Please ask the patient to let us know the name and the dosage of the diuretic she has been taking. Please offer her a virtual or audio visit to discuss further. Thanks BRENDA

## 2022-09-20 NOTE — TELEPHONE ENCOUNTER
Agree with plan   - please help schedule her an appt with me in 2-3 weeks for blood pressure follow up and let her know that it can take a few weeks to see that maximum effect of losartan but she should see improvement after starting  - please ask her to bring her home cuff to that appt as well as her home bp log     I recommend:  monitor bp closely at home and record blood pressure log to bring to appt  only check blood pressure when you can rest for at least 5 minutes (ideally 10-15 minutes) prior to checking blood pressure  Sit up in comfortable chair with feet flat on ground and arm resting at side on table  Avoid checking bp at times when you are nervous, in pain, frustrated or excited    Please empty your bladder prior to checking blood pressure as well   Do not talk or watch TV or check emails while resting prior to and when measuring blood pressure

## 2022-09-30 ENCOUNTER — EXTERNAL CHRONIC CARE MANAGEMENT (OUTPATIENT)
Dept: PRIMARY CARE CLINIC | Facility: CLINIC | Age: 77
End: 2022-09-30
Payer: MEDICARE

## 2022-09-30 PROCEDURE — 99490 PR CHRONIC CARE MGMT, 1ST 20 MIN: ICD-10-PCS | Mod: S$PBB,,, | Performed by: INTERNAL MEDICINE

## 2022-09-30 PROCEDURE — 99490 CHRNC CARE MGMT STAFF 1ST 20: CPT | Mod: PBBFAC | Performed by: INTERNAL MEDICINE

## 2022-09-30 PROCEDURE — 99490 CHRNC CARE MGMT STAFF 1ST 20: CPT | Mod: S$PBB,,, | Performed by: INTERNAL MEDICINE

## 2022-10-13 ENCOUNTER — OFFICE VISIT (OUTPATIENT)
Dept: INTERNAL MEDICINE | Facility: CLINIC | Age: 77
End: 2022-10-13
Attending: INTERNAL MEDICINE
Payer: MEDICARE

## 2022-10-13 ENCOUNTER — PATIENT MESSAGE (OUTPATIENT)
Dept: INTERNAL MEDICINE | Facility: CLINIC | Age: 77
End: 2022-10-13

## 2022-10-13 VITALS
HEART RATE: 74 BPM | DIASTOLIC BLOOD PRESSURE: 90 MMHG | HEIGHT: 65 IN | SYSTOLIC BLOOD PRESSURE: 130 MMHG | OXYGEN SATURATION: 99 % | WEIGHT: 128.5 LBS | BODY MASS INDEX: 21.41 KG/M2

## 2022-10-13 DIAGNOSIS — Z79.2 LONG TERM (CURRENT) USE OF ANTIBIOTICS: ICD-10-CM

## 2022-10-13 DIAGNOSIS — Z20.822 EXPOSURE TO COVID-19 VIRUS: ICD-10-CM

## 2022-10-13 DIAGNOSIS — I10 HYPERTENSION, UNSPECIFIED TYPE: Primary | ICD-10-CM

## 2022-10-13 DIAGNOSIS — A31.0 MYCOBACTERIAL DISEASE, PULMONARY: ICD-10-CM

## 2022-10-13 PROCEDURE — 99999 PR PBB SHADOW E&M-EST. PATIENT-LVL IV: CPT | Mod: PBBFAC,,, | Performed by: INTERNAL MEDICINE

## 2022-10-13 PROCEDURE — 99214 OFFICE O/P EST MOD 30 MIN: CPT | Mod: S$PBB,,, | Performed by: INTERNAL MEDICINE

## 2022-10-13 PROCEDURE — 99999 PR PBB SHADOW E&M-EST. PATIENT-LVL IV: ICD-10-PCS | Mod: PBBFAC,,, | Performed by: INTERNAL MEDICINE

## 2022-10-13 PROCEDURE — 99214 OFFICE O/P EST MOD 30 MIN: CPT | Mod: PBBFAC | Performed by: INTERNAL MEDICINE

## 2022-10-13 PROCEDURE — 99214 PR OFFICE/OUTPT VISIT, EST, LEVL IV, 30-39 MIN: ICD-10-PCS | Mod: S$PBB,,, | Performed by: INTERNAL MEDICINE

## 2022-10-13 RX ORDER — OMADACYCLINE 150 MG/1
TABLET, FILM COATED ORAL
COMMUNITY
Start: 2022-07-25 | End: 2022-10-24 | Stop reason: SDUPTHER

## 2022-10-13 RX ORDER — HYDROCHLOROTHIAZIDE 25 MG/1
25 TABLET ORAL DAILY
Qty: 90 TABLET | Refills: 3 | Status: SHIPPED | OUTPATIENT
Start: 2022-10-13 | End: 2023-02-07 | Stop reason: SDUPTHER

## 2022-10-13 NOTE — PROGRESS NOTES
Subjective:   Patient ID: Marly Maciel is a 76 y.o. female  Chief complaint:   Chief Complaint   Patient presents with    Hypertension     Thinks her antibiotics are raising her BP       HPI  Here for 6 month f/u of HTN:      To have lobectomy at [facility] and to f/u at [facility]     HTn:  Drinking 64oz of water per day   Started diuretic after given by son   Felt LH after starting this  and stopped   Did not start losartan at all after this   Home readings:   108/67  132/77  154/85   117/69  102/62  135/90  117/74  105/63   121/69  114/64  Previously:   HTN, hx of palpitations:   - now taking metoprolol 50 mg daily - started by cardiologist when seen for palpitations - sx well controlled and discussed that I can send in refills and take over rx   - prev was taking losartan and occ lightheaded when stood quickly and it was stopped  Needs pcr covid test prior to traveling to [place] for lung - lobectomy     Needs ekg q3m for clofazamine   Last was at [facility] - discussed that I can take over ordering this when needed post op     Bronchiectasis and pulm MAC and pulm pseudomonas colonization previously followed by pulm/ID specialists in [place] - Dr. Brayden Marks in [place], [place] and was taking azithromycin 250mg daily at baseline now s/p IV abx for this and followed by [facility]. [facility]:  Today: going to [place] to see specialist Dr. Santana - now at [facility]   - plan is for surgery as above   - tried mult meds - iv and po and on chronic azithro and clofazimine  - lung cavity lesion inc in size slightly  Previously:    - Completed IV abx for pulm Mac through [facility]  Did not sunshine PO meds after IV meds completed 2/2 to severe sob and other sx   Cultures neg for AFB Oct and Nov 2019 and CT scan cleared   - per pt she reports that she called pulm specialiasts and attrib sx to combo fo oral meds   - then taking clofazimine and azithro - d/c'd after had SE   - then started having tachycardia and severe  "[place]   - had EKG - normal - and echo EF 61% and grade 1 DD  - had lexiscan 11/2019 and negative   - started on metoprolol 25mg and seen by Dr. Raymond and inc to 50mg   Around [place] when she saw her son who is a cardiologist she stopped all abx and all sx resolved   At [facility]cv:   - has been receiving IV abx again for 6 months after CT scan at [facility] through her pulmonologist Dr. Ibrahim showed worsening RUPA findings and she has f/u with her pulmonologist soon to discuss plan moving forward  - Ct 6/15/2021  - had telemed visit 11/19/2021  - today reports feeling well with no change in baseline symptom of cough or in any fevers     HM:   - due for shingrix vaccine      Review of Systems   Constitutional:  Negative for activity change and unexpected weight change.   HENT:  Negative for hearing loss, rhinorrhea and trouble swallowing.    Eyes:  Negative for discharge and visual disturbance.   Respiratory:  Negative for chest tightness and wheezing.    Cardiovascular:  Negative for chest pain and palpitations.   Gastrointestinal:  Negative for blood in stool, constipation, diarrhea and vomiting.   Endocrine: Negative for polydipsia and polyuria.   Genitourinary:  Negative for difficulty urinating, dysuria, hematuria and menstrual problem.   Musculoskeletal:  Negative for arthralgias, joint swelling and neck pain.   Neurological:  Negative for weakness and headaches.   Psychiatric/Behavioral:  Negative for confusion and dysphoric mood.      Objective:  Vitals:    10/13/22 0932   BP: (!) 130/90   BP Location: Left arm   Patient Position: Sitting   Pulse: 74   SpO2: 99%   Weight: 58.3 kg (128 lb 8.5 oz)   Height: 5' 5" (1.651 m)     Body mass index is 21.39 kg/m².    Physical Exam  Vitals reviewed.   Constitutional:       General: She is not in acute distress.     Appearance: Normal appearance. She is well-developed. She is not diaphoretic.   HENT:      Head: Normocephalic and atraumatic.   Eyes:      General:         Right " eye: No discharge.         Left eye: No discharge.      Extraocular Movements: Extraocular movements intact.      Conjunctiva/sclera: Conjunctivae normal.   Neck:      Thyroid: No thyromegaly.   Cardiovascular:      Rate and Rhythm: Normal rate and regular rhythm.      Pulses: Normal pulses.      Heart sounds: Normal heart sounds.   Pulmonary:      Effort: Pulmonary effort is normal. No respiratory distress.   Abdominal:      General: Bowel sounds are normal.      Palpations: Abdomen is soft.   Musculoskeletal:         General: No swelling or tenderness.      Cervical back: Neck supple.   Lymphadenopathy:      Cervical: No cervical adenopathy.   Skin:     General: Skin is warm and dry.      Capillary Refill: Capillary refill takes less than 2 seconds.      Findings: No erythema or rash.   Neurological:      General: No focal deficit present.      Mental Status: She is alert and oriented to person, place, and time. Mental status is at baseline.   Psychiatric:         Behavior: Behavior normal.         Thought Content: Thought content normal.         Judgment: Judgment normal.       Assessment:  1. Hypertension, unspecified type    2. Exposure to COVID-19 virus    3. Long term (current) use of antibiotics    4. Mycobacterial disease, pulmonary        Plan:  Marly was seen today for hypertension.    Diagnoses and all orders for this visit:    Hypertension, unspecified type  -     hydroCHLOROthiazide (HYDRODIURIL) 25 MG tablet; Take 1 tablet (25 mg total) by mouth once daily.  Home readings at goal   Stay well hydrated   Cont BB     Exposure to COVID-19 virus  -     Cancel: COVID-19 Routine Screening; Future    Long term (current) use of antibiotics  -     SCHEDULED EKG 12-LEAD (to Muse); Standing  Will order q3m while taking Rx for pulm mac managed by pulm     Mycobacterial disease, pulmonary  To have lobectomy in [place]  stable    Health Maintenance   Topic Date Due    Mammogram  06/02/2023    High Dose Statin  10/13/2023     DEXA Scan  07/06/2025    TETANUS VACCINE  06/14/2026    Lipid Panel  07/05/2027    Hepatitis C Screening  Completed

## 2022-10-21 ENCOUNTER — SPECIALTY PHARMACY (OUTPATIENT)
Dept: PHARMACY | Facility: CLINIC | Age: 77
End: 2022-10-21
Payer: MEDICARE

## 2022-10-21 DIAGNOSIS — A31.8 MYCOBACTERIUM ABSCESSUS INFECTION: Primary | ICD-10-CM

## 2022-10-21 NOTE — TELEPHONE ENCOUNTER
[facility] notified [facility] that Nuzyra Rx was being transferred from them to [facility]. Order set pended to begin work up.

## 2022-10-21 NOTE — TELEPHONE ENCOUNTER
Nuzyra (omadcycline) test claim - $386.54 copay for 15 DS. PA not required. Benefits investigation not required ([employer]).     Incoming call from patient to [facility] PCA team member stating she has enough to last through Monday (10/24/22). [facility] attempted to call back to determine where the patient has been receiving medication from since dispense history shows med was last filled for 3 DS in July 2022. [facility] also needs to confirm how she is taking medication since current Rx is written for 1 tablet BID but dose administration is usually prescribed as once daily.     Also of note, Sivextro (Tedizolid) is being filled at [facility].

## 2022-10-24 NOTE — TELEPHONE ENCOUNTER
10/21/22: Incoming call back from patient. She confirms she has been taking Nuzya (Omadacycline) 1 tablet BID and also has been paying $386.54 copay each 15 day fill. She was receiving medication from local [facility] but was having difficulty in receiving refills in time. Patient only has enough doses to last through Monday (10/24/22). Patient is aware that she must fill Sivextro through [facility].     Patient knows about proper administration of Nuzyra and that is must be administered on an empty stomach. [facility] proceeding with refill/initial fill at [facility] to avoid therapy gaps.     [facility] will ensure she knows about DDIs with Nuzyra + Multivitamin and Ca/VitD at time of : Avoid dairy and other products with multivalent cations (eg, antacids, multivitamins) for 2 hours before or 4 hours after administration.     [facility] outside provider referral form faxed to provider (Tad Taylor MD) - [phone] (Fax). Fax receipt received at 9:57 AM.     Intervention opened.

## 2022-10-24 NOTE — TELEPHONE ENCOUNTER
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 female, who is followed by the specialty pharmacy service for management and education.    Recent Encounters       Date Type Provider Description    10/21/2022 Specialty Pharmacy Leatha Gonzalez PharmD Referral Authorization; Initial Clinical Assessment; Clinical Intervention          Clinical call attempts since last clinical assessment   No call attempts found.     Current Outpatient Medications   Medication Sig    amikacin 1,000 mg/4 mL Soln     atorvastatin (LIPITOR) 10 MG tablet Take 1 tablet (10 mg total) by mouth once daily.    biotin 1 mg tablet Take 1,000 mcg by mouth 3 (three) times daily.    calcium phosphate trib/vit D3 (CALTRATE GUMMY BITES ORAL) Take 2 tablets by mouth once daily.    celecoxib (CELEBREX) 200 MG capsule Take 200 mg by mouth.    CLOFAZIMINE ORAL Take 50 mg by mouth 2 (two) times a day.    hydroCHLOROthiazide (HYDRODIURIL) 25 MG tablet Take 1 tablet (25 mg total) by mouth once daily.    metoprolol succinate (TOPROL-XL) 50 MG 24 hr tablet TAKE 1 TABLET (50 MG TOTAL) BY MOUTH ONCE DAILY.    metoprolol succinate (TOPROL-XL) 50 MG 24 hr tablet Take 1 tablet (50 mg total) by mouth once daily.    mv-min/iron/folic/calcium/vitK (WOMEN'S MULTIVITAMIN ORAL) Take 1 tablet by mouth once daily.    omadacycline (NUZYRA) 150 mg Tab Take 1 tablet in the morning and 1 tablet in the evening by mouth. Take after meals for chronic pulminary infection.    tedizolid (SIVEXTRO) 200 mg Tab Take 1 tablet Every Monday, Wednesday, and Friday by mouth   Last reviewed on 10/24/2022  12:12 PM by Leatha Gonzalez, PharmD    Review of patient's allergies indicates:   Allergen Reactions    Adhesive Rash     Tape & bandaids  Tape & bandaids    Albuterol Other (See Comments)     faint    Ciprofloxacin Other (See Comments)     Central nervous system issues   Last reviewed on  10/13/2022 10:14 AM by Stacy Pereira    Drug Interactions    Drug interactions evaluated: yes  Clinically relevant drug interactions identified: yes  Drug management plan: Multivitamin + Ca/VitD: Administer the polyvalent cation-containing multivitamin at least 2 hours before or 4 hours after Nuzyra dose.    Provided the patient with educational material regarding drug interactions: not applicable         Adverse Effects    Constitution: System reviewed and is negative  Skin: System reviewed and is negative  Eyes: System reviewed and is negative  Endocrine and Metabolic: System reviewed and is negative  Gastrointestinal: System reviewed and is negative  Genitourinary: System reviewed and is negative  Cardiovascular: System reviewed and is negative  Hematologic: System reviewed and is negative  Musculoskeletal: System reviewed and is negative  HENT: System reviewed and is negative  Neurological: System reviewed and is negative  Psychiatric: System reviewed and is negative  Respiratory: System reviewed and is negative       Assessment Questions - Documented Responses      Flowsheet Row Most Recent Value   Assessment    Medication Reconciliation completed for patient Yes   During the past 4 weeks, has patient missed any activities due to condition or medication? No   During the past 4 weeks, did patient have any of the following urgent care visits? None   Goals of Therapy Status Achieving   Status of the patients ability to self-administer: Is Able   All education points have been covered with patient? No, patient declined- printed education provided  [Patient already on medication and has previous knowledge of med.]   Welcome  "packet contents reviewed and discussed with patient? Yes   Assesment completed? Yes   Plan Therapy continued   Do you need to open a clinical intervention (i-vent)? No   Do you want to schedule first shipment? Yes          Refill Questions - Documented Responses      Flowsheet Row Most Recent Value   Refill Screening Questions    When does the patient need to receive the medication? 10/24/22   Refill Delivery Questions    How will the patient receive the medication? Pickup   When does the patient need to receive the medication? 10/24/22   Expected Copay ($) 386.74   Is the patient able to afford the medication copay? Yes   Payment Method at pickup   Days supply of Refill 15   Supplies needed? No supplies needed   Refill activity completed? Yes   Refill activity plan Refill scheduled   Shipment/Pickup Date: 10/24/22            Objective    She has a past medical history of Hashimoto's thyroiditis, Hyperlipidemia, Hypertension, Internal hemorrhoids, and Osteopenia.    Tried/failed medications: Linezolid, Azithromycin, Amikacin, Clofazimine    BP Readings from Last 4 Encounters:   10/13/22 (!) 130/90   08/11/22 132/66   06/29/22 120/72   08/20/21 (!) 165/71     Ht Readings from Last 4 Encounters:   10/13/22 5' 5" (1.651 m)   08/11/22 5' 5" (1.651 m)   06/29/22 5' 5" (1.651 m)   04/22/22 5' 5" (1.651 m)     Wt Readings from Last 4 Encounters:   10/13/22 58.3 kg (128 lb 8.5 oz)   08/11/22 58.5 kg (129 lb)   06/29/22 58.9 kg (129 lb 13.6 oz)   04/22/22 55.8 kg (123 lb)       The goals of prescribed drug therapy management include:  Supporting patient to meet the prescriber's medical treatment objectives  Improving or maintaining quality of life  Maintaining optimal therapy adherence  Minimizing and managing side effects      Goals of Therapy Status: Achieving    Assessment/Plan  Patient plans to continue therapy without changes    Interventions added this encounter   Open: [facility] Provider Intervention: omadacycline (NUZYRA) " 150 mg Tab     Indication, dosage, appropriateness, effectiveness, safety and convenience of her specialty medication(s) were reviewed today.     Patient Education   Pharmacist offer to  patient was declined. Printed educational materials will be provided with medication.      Clinical Review:  Diagnosis: Mycobacterium abscessus infection  Renal/Hepatic: There is no renal/hepatic dosing adjustments per package labeling.    Tasks added this encounter   11/2/2022 - Refill Call (Auto Added)   Tasks due within next 3 months   No tasks due.     Leatha Gonzalez, PharmD  [facility]  [address] [facility] [address]  Phone: [phone]  Fax: [phone]

## 2022-10-27 ENCOUNTER — CLINICAL SUPPORT (OUTPATIENT)
Dept: INTERNAL MEDICINE | Facility: CLINIC | Age: 77
End: 2022-10-27
Payer: MEDICARE

## 2022-10-27 DIAGNOSIS — Z20.822 EXPOSURE TO COVID-19 VIRUS: ICD-10-CM

## 2022-10-27 LAB — SARS-COV-2 RNA RESP QL NAA+PROBE: NOT DETECTED

## 2022-10-27 PROCEDURE — U0003 INFECTIOUS AGENT DETECTION BY NUCLEIC ACID (DNA OR RNA); SEVERE ACUTE RESPIRATORY SYNDROME CORONAVIRUS 2 (SARS-COV-2) (CORONAVIRUS DISEASE [COVID-19]), AMPLIFIED PROBE TECHNIQUE, MAKING USE OF HIGH THROUGHPUT TECHNOLOGIES AS DESCRIBED BY CMS-2020-01-R: HCPCS | Performed by: INTERNAL MEDICINE

## 2022-10-27 PROCEDURE — U0005 INFEC AGEN DETEC AMPLI PROBE: HCPCS | Performed by: INTERNAL MEDICINE

## 2022-10-31 PROBLEM — E89.2 H/O PARATHYROIDECTOMY: Status: RESOLVED | Noted: 2020-02-11 | Resolved: 2022-10-31

## 2022-10-31 PROBLEM — Z98.890 H/O PARATHYROIDECTOMY: Status: RESOLVED | Noted: 2020-02-11 | Resolved: 2022-10-31

## 2022-10-31 PROBLEM — Z90.89 H/O PARATHYROIDECTOMY: Status: RESOLVED | Noted: 2020-02-11 | Resolved: 2022-10-31

## 2022-10-31 PROBLEM — I50.32 CHRONIC DIASTOLIC HEART FAILURE: Status: RESOLVED | Noted: 2019-10-21 | Resolved: 2022-10-31

## 2022-11-02 ENCOUNTER — SPECIALTY PHARMACY (OUTPATIENT)
Dept: PHARMACY | Facility: CLINIC | Age: 77
End: 2022-11-02
Payer: MEDICARE

## 2022-11-02 NOTE — TELEPHONE ENCOUNTER
11/2 WWB  Pt reports that she will be released and home from [facility] surgery by 11/8. Pt requested a call back on 11/9 to schedule refill. Will also route to the [facility].

## 2022-11-07 ENCOUNTER — PATIENT MESSAGE (OUTPATIENT)
Dept: INTERNAL MEDICINE | Facility: CLINIC | Age: 77
End: 2022-11-07
Payer: MEDICARE

## 2022-11-08 NOTE — TELEPHONE ENCOUNTER
Specialty Pharmacy - Refill Coordination    Specialty Medication Orders Linked to Encounter      Flowsheet Row Most Recent Value   Medication #1 omadacycline (NUZYRA) 150 mg Tab (Order#[id number], Rx#[id number])            Refill Questions - Documented Responses      Flowsheet Row Most Recent Value   Patient Availability and HIPAA Verification    Does patient want to proceed with activity? Yes   HIPAA/medical authority confirmed? Yes   Relationship to patient of person spoken to? Self   Refill Screening Questions    Changes to allergies? No   Changes to medications? No   New conditions since last clinic visit? No   Unplanned office visit, urgent care, ED, or hospital admission in the last 4 weeks? No   How does patient/caregiver feel medication is working? Good   Financial problems or insurance changes? No   How many doses of your specialty medications were missed in the last 4 weeks? 0   Would patient like to speak to a pharmacist? No   When does the patient need to receive the medication? 11/09/22   Refill Delivery Questions    How will the patient receive the medication? Pickup   When does the patient need to receive the medication? 11/09/22   Shipping Address Home   Address in [facility] confirmed and updated if neccessary? Yes   Expected Copay ($) 386.54   Is the patient able to afford the medication copay? Yes   Payment Method at pickup   Days supply of Refill 30   Supplies needed? No supplies needed   Refill activity completed? Yes   Refill activity plan Refill scheduled   Shipment/Pickup Date: 11/09/22            Current Outpatient Medications   Medication Sig    amikacin 1,000 mg/4 mL Soln     atorvastatin (LIPITOR) 10 MG tablet Take 1 tablet (10 mg total) by mouth once daily.    biotin 1 mg tablet Take 1,000 mcg by mouth 3 (three) times daily.    calcium phosphate trib/vit D3 (CALTRATE GUMMY BITES ORAL) Take 2 tablets by mouth once daily.    celecoxib (CELEBREX) 200 MG capsule Take 200 mg by mouth.     CLOFAZIMINE ORAL Take 50 mg by mouth 2 (two) times a day.    hydroCHLOROthiazide (HYDRODIURIL) 25 MG tablet Take 1 tablet (25 mg total) by mouth once daily.    metoprolol succinate (TOPROL-XL) 50 MG 24 hr tablet TAKE 1 TABLET (50 MG TOTAL) BY MOUTH ONCE DAILY.    metoprolol succinate (TOPROL-XL) 50 MG 24 hr tablet Take 1 tablet (50 mg total) by mouth once daily.    mv-min/iron/folic/calcium/vitK (WOMEN'S MULTIVITAMIN ORAL) Take 1 tablet by mouth once daily.    omadacycline (NUZYRA) 150 mg Tab Take 1 tablet in the morning and 1 tablet in the evening by mouth. Take after meals for chronic pulminary infection.    tedizolid (SIVEXTRO) 200 mg Tab Take 1 tablet Every Monday, Wednesday, and Friday by mouth   Last reviewed on 10/31/2022  4:39 PM by Stacy Pereira MD    Review of patient's allergies indicates:   Allergen Reactions    Adhesive Rash     Tape & bandaids  Tape & bandaids    Albuterol Other (See Comments)     faint    Ciprofloxacin Other (See Comments)     Central nervous system issues    Last reviewed on  10/31/2022 4:38 PM by Stacy Pereira      Tasks added this encounter   12/2/2022 - Refill Call (Auto Added)  11/10/2022 - Pickup Reminder   Tasks due within next 3 months   No tasks due.     Aggie Fulton [facility] - Specialty Pharmacy  [address] [place] [id number]  Phone: [phone]  Fax: [phone]

## 2022-11-10 NOTE — PROGRESS NOTES
"Subjective:   Patient ID: Marly Maciel is a 76 y.o. female  Chief complaint:   Chief Complaint   Patient presents with    Suture / Staple Removal       HPI  Here for suture removal     Had lobectomy and here for removal of sutures from chest tube 11/1/2022 - CT tube removecd 11/2  No drainage and no pain    Staying on iv amikacin for 1 more month - started yesterday  Then to switch to inhaled amikacin  Then to start on 3 oral meds for 1 year     Started sneezing and burning itchy watery eyes and anti-histamines ineffective - no fever or sore throat - resolved next day     To continue with ekg x3 through our office due to clofozamine    Patient presents for suture removal. 3 were removed   the wound is without signs of infection  3 sutures are removed.  Wound care and activity instructions given. Return prn.    Review of Systems    Objective:  Vitals:    11/11/22 1132   BP: 116/74   BP Location: Left arm   Patient Position: Sitting   Pulse: 71   SpO2: 97%   Weight: 59 kg (130 lb 1.1 oz)   Height: 5' 5" (1.651 m)     Body mass index is 21.64 kg/m².    Physical Exam  Constitutional:       General: She is not in acute distress.     Appearance: She is well-developed. She is not diaphoretic.   Eyes:      General:         Right eye: No discharge.         Left eye: No discharge.      Conjunctiva/sclera: Conjunctivae normal.   Pulmonary:      Effort: Pulmonary effort is normal. No respiratory distress.   Skin:     Findings: No erythema.      Comments: Incisions at abd and thorax without inc warmth, purulent drainage, fluctuance or redness  3 sutures in place - no surrounding redness, drainage, warmth or fluctuance   Neurological:      Mental Status: She is alert and oriented to person, place, and time.   Psychiatric:         Behavior: Behavior normal.         Thought Content: Thought content normal.         Judgment: Judgment normal.       Assessment:  1. Status post partial lobectomy of lung    2. Mycobacterium avium " complex    3. Visit for suture removal        Plan:  Marly was seen today for suture / staple removal.    Diagnoses and all orders for this visit:    Status post partial lobectomy of lung  Healing well     Mycobacterium avium complex  Stable - f/u with specialists and cont meds  Check ekg q3m as scheduled     Visit for suture removal  Counseled to monitor for si/sx of infection   If any concerns arise do not hesitate to let me know   Keep area clean and dry - do not soak - pat dry after shower     Health Maintenance   Topic Date Due    Mammogram  06/02/2023    High Dose Statin  11/11/2023    DEXA Scan  07/06/2025    TETANUS VACCINE  06/14/2026    Lipid Panel  07/05/2027    Hepatitis C Screening  Completed

## 2022-11-11 ENCOUNTER — OFFICE VISIT (OUTPATIENT)
Dept: INTERNAL MEDICINE | Facility: CLINIC | Age: 77
End: 2022-11-11
Attending: INTERNAL MEDICINE
Payer: MEDICARE

## 2022-11-11 VITALS
WEIGHT: 130.06 LBS | HEART RATE: 71 BPM | OXYGEN SATURATION: 97 % | HEIGHT: 65 IN | BODY MASS INDEX: 21.67 KG/M2 | SYSTOLIC BLOOD PRESSURE: 116 MMHG | DIASTOLIC BLOOD PRESSURE: 74 MMHG

## 2022-11-11 DIAGNOSIS — Z90.2 STATUS POST PARTIAL LOBECTOMY OF LUNG: Primary | ICD-10-CM

## 2022-11-11 DIAGNOSIS — Z48.02 VISIT FOR SUTURE REMOVAL: ICD-10-CM

## 2022-11-11 DIAGNOSIS — A31.0 MYCOBACTERIUM AVIUM COMPLEX: ICD-10-CM

## 2022-11-11 PROCEDURE — 99213 OFFICE O/P EST LOW 20 MIN: CPT | Mod: S$PBB,,, | Performed by: INTERNAL MEDICINE

## 2022-11-11 PROCEDURE — 99999 PR PBB SHADOW E&M-EST. PATIENT-LVL III: CPT | Mod: PBBFAC,,, | Performed by: INTERNAL MEDICINE

## 2022-11-11 PROCEDURE — 99213 PR OFFICE/OUTPT VISIT, EST, LEVL III, 20-29 MIN: ICD-10-PCS | Mod: S$PBB,,, | Performed by: INTERNAL MEDICINE

## 2022-11-11 PROCEDURE — 99999 PR PBB SHADOW E&M-EST. PATIENT-LVL III: ICD-10-PCS | Mod: PBBFAC,,, | Performed by: INTERNAL MEDICINE

## 2022-11-11 PROCEDURE — 99213 OFFICE O/P EST LOW 20 MIN: CPT | Mod: PBBFAC | Performed by: INTERNAL MEDICINE

## 2022-11-11 RX ORDER — PREGABALIN 25 MG/1
25 CAPSULE ORAL 2 TIMES DAILY
COMMUNITY
Start: 2022-11-03 | End: 2023-02-07

## 2022-11-29 ENCOUNTER — SPECIALTY PHARMACY (OUTPATIENT)
Dept: PHARMACY | Facility: CLINIC | Age: 77
End: 2022-11-29
Payer: MEDICARE

## 2022-11-29 NOTE — TELEPHONE ENCOUNTER
Specialty Pharmacy - Refill Coordination    Specialty Medication Orders Linked to Encounter      Flowsheet Row Most Recent Value   Medication #1 omadacycline (NUZYRA) 150 mg Tab (Order#[id number], Rx#[id number])            Refill Questions - Documented Responses      Flowsheet Row Most Recent Value   Patient Availability and HIPAA Verification    Does patient want to proceed with activity? Yes   HIPAA/medical authority confirmed? Yes   Relationship to patient of person spoken to? Self   Refill Screening Questions    Changes to allergies? No   Changes to medications? No   New conditions since last clinic visit? No   Unplanned office visit, urgent care, ED, or hospital admission in the last 4 weeks? No   How does patient/caregiver feel medication is working? Too soon to tell   Financial problems or insurance changes? No   How many doses of your specialty medications were missed in the last 4 weeks? 0   Would patient like to speak to a pharmacist? No   When does the patient need to receive the medication? 12/05/22   Refill Delivery Questions    How will the patient receive the medication? MEDRx   When does the patient need to receive the medication? 12/05/22   Shipping Address Home   Address in [facility] confirmed and updated if neccessary? Yes   Expected Copay ($) 386.54   Is the patient able to afford the medication copay? Yes   Payment Method CC on file   Days supply of Refill 15   Supplies needed? No supplies needed   Refill activity completed? Yes   Refill activity plan Refill scheduled   Shipment/Pickup Date: 11/30/22            Current Outpatient Medications   Medication Sig    amikacin 1,000 mg/4 mL Soln     atorvastatin (LIPITOR) 10 MG tablet Take 1 tablet (10 mg total) by mouth once daily.    biotin 1 mg tablet Take 1,000 mcg by mouth 3 (three) times daily.    calcium phosphate trib/vit D3 (CALTRATE GUMMY BITES ORAL) Take 2 tablets by mouth once daily.    celecoxib (CELEBREX) 200 MG capsule Take 200  mg by mouth.    CLOFAZIMINE ORAL Take 50 mg by mouth 2 (two) times a day.    hydroCHLOROthiazide (HYDRODIURIL) 25 MG tablet Take 1 tablet (25 mg total) by mouth once daily.    metoprolol succinate (TOPROL-XL) 50 MG 24 hr tablet Take 1 tablet (50 mg total) by mouth once daily.    mv-min/iron/folic/calcium/vitK (WOMEN'S MULTIVITAMIN ORAL) Take 1 tablet by mouth once daily.    omadacycline (NUZYRA) 150 mg Tab Take 1 tablet in the morning and 1 tablet in the evening by mouth. Take after meals for chronic pulminary infection.    pregabalin (LYRICA) 25 MG capsule Take 25 mg by mouth 2 (two) times a day.    tedizolid (SIVEXTRO) 200 mg Tab Take 1 tablet Every Monday, Wednesday, and Friday by mouth   Last reviewed on 11/11/2022  9:49 PM by Stacy Pereira MD    Review of patient's allergies indicates:   Allergen Reactions    Adhesive Rash     Tape & bandaids  Tape & bandaids    Albuterol Other (See Comments)     faint    Ciprofloxacin Other (See Comments)     Central nervous system issues    Last reviewed on  11/11/2022 9:49 PM by Stacy Pereira      Tasks added this encounter   12/13/2022 - Refill Call (Auto Added)   Tasks due within next 3 months   No tasks due.     Gaby Olivares, PharmD  [facility] - Specialty Pharmacy  [address] [place] [id number]  Phone: [phone]  Fax: [phone]

## 2022-12-07 ENCOUNTER — PATIENT MESSAGE (OUTPATIENT)
Dept: INTERNAL MEDICINE | Facility: CLINIC | Age: 77
End: 2022-12-07
Payer: MEDICARE

## 2022-12-07 DIAGNOSIS — A31.0 PULMONARY MYCOBACTERIUM AVIUM COMPLEX (MAC) INFECTION: Primary | ICD-10-CM

## 2022-12-07 NOTE — TELEPHONE ENCOUNTER
I signed orders for port removal and IR consult to help arrange this.  She should be contacted by the IR team - if she has not in the next 1-2 days then please let me know!  - I am happy to hear that it can be removed and hope that she is continuing to feel well!!!

## 2022-12-13 ENCOUNTER — SPECIALTY PHARMACY (OUTPATIENT)
Dept: PHARMACY | Facility: CLINIC | Age: 77
End: 2022-12-13
Payer: MEDICARE

## 2022-12-13 NOTE — TELEPHONE ENCOUNTER
Specialty Pharmacy - Refill Coordination    Specialty Medication Orders Linked to Encounter      Flowsheet Row Most Recent Value   Medication #1 omadacycline (NUZYRA) 150 mg Tab (Order#[id number], Rx#[id number])            Refill Questions - Documented Responses      Flowsheet Row Most Recent Value   Patient Availability and HIPAA Verification    Does patient want to proceed with activity? Yes   HIPAA/medical authority confirmed? Yes   Relationship to patient of person spoken to? Self   Refill Screening Questions    Changes to allergies? No   Changes to medications? No   New conditions since last clinic visit? No   Unplanned office visit, urgent care, ED, or hospital admission in the last 4 weeks? No   How does patient/caregiver feel medication is working? Good   Financial problems or insurance changes? No   How many doses of your specialty medications were missed in the last 4 weeks? 0   Would patient like to speak to a pharmacist? No   When does the patient need to receive the medication? 12/16/22   Refill Delivery Questions    How will the patient receive the medication? MEDRx   When does the patient need to receive the medication? 12/16/22   Shipping Address Home   Address in [facility] confirmed and updated if neccessary? Yes   Expected Copay ($) 386.54   Is the patient able to afford the medication copay? Yes   Payment Method CC on file   Days supply of Refill 15   Refill activity completed? Yes   Refill activity plan Refill scheduled   Shipment/Pickup Date: 12/14/22            Current Outpatient Medications   Medication Sig    amikacin 1,000 mg/4 mL Soln     atorvastatin (LIPITOR) 10 MG tablet Take 1 tablet (10 mg total) by mouth once daily.    biotin 1 mg tablet Take 1,000 mcg by mouth 3 (three) times daily.    calcium phosphate trib/vit D3 (CALTRATE GUMMY BITES ORAL) Take 2 tablets by mouth once daily.    celecoxib (CELEBREX) 200 MG capsule Take 200 mg by mouth.    CLOFAZIMINE ORAL Take 50 mg by  mouth 2 (two) times a day.    hydroCHLOROthiazide (HYDRODIURIL) 25 MG tablet Take 1 tablet (25 mg total) by mouth once daily.    metoprolol succinate (TOPROL-XL) 50 MG 24 hr tablet Take 1 tablet (50 mg total) by mouth once daily.    mv-min/iron/folic/calcium/vitK (WOMEN'S MULTIVITAMIN ORAL) Take 1 tablet by mouth once daily.    omadacycline (NUZYRA) 150 mg Tab Take 1 tablet in the morning and 1 tablet in the evening by mouth. Take after meals for chronic pulminary infection.    pregabalin (LYRICA) 25 MG capsule Take 25 mg by mouth 2 (two) times a day.    tedizolid (SIVEXTRO) 200 mg Tab Take 1 tablet Every Monday, Wednesday, and Friday by mouth   Last reviewed on 11/11/2022  9:49 PM by Stacy Pereira MD    Review of patient's allergies indicates:   Allergen Reactions    Adhesive Rash     Tape & bandaids  Tape & bandaids    Albuterol Other (See Comments)     faint    Ciprofloxacin Other (See Comments)     Central nervous system issues    Last reviewed on  11/11/2022 9:49 PM by Stacy Pereira      Tasks added this encounter   12/24/2022 - Refill Call (Auto Added)   Tasks due within next 3 months   No tasks due.     Aggie Fulton [facility] - Specialty Pharmacy  [address] [place] [id number]  Phone: [phone]  Fax: [phone]

## 2022-12-19 ENCOUNTER — HOSPITAL ENCOUNTER (OUTPATIENT)
Facility: OTHER | Age: 77
Discharge: HOME | End: 2022-12-19
Attending: RADIOLOGY | Admitting: RADIOLOGY
Payer: MEDICARE

## 2022-12-19 VITALS
WEIGHT: 130.06 LBS | DIASTOLIC BLOOD PRESSURE: 60 MMHG | BODY MASS INDEX: 21.67 KG/M2 | TEMPERATURE: 98 F | SYSTOLIC BLOOD PRESSURE: 136 MMHG | HEIGHT: 65 IN | HEART RATE: 60 BPM | OXYGEN SATURATION: 96 % | RESPIRATION RATE: 18 BRPM

## 2022-12-19 DIAGNOSIS — A31.0 PULMONARY MYCOBACTERIUM AVIUM COMPLEX (MAC) INFECTION: ICD-10-CM

## 2022-12-19 NOTE — H&P
Consult/H&P Note  Interventional Radiology    Consult Requested By: ID    Reason for Consult: tunneled line removal    SUBJECTIVE:     Chief Complaint: MAC    History of Present Illness: 76 yo F with MAC infection.  R IJ tunneled line in place since August, no longer needed.     Past Medical History:   Diagnosis Date    Hashimoto's thyroiditis     Hyperlipidemia     Hypertension     Internal hemorrhoids     Osteopenia      Past Surgical History:   Procedure Laterality Date    APPENDECTOMY  1960    CHOLECYSTECTOMY  1997    COLONOSCOPY  09/04/2018    [facility]    ESOPHAGOGASTRODUODENOSCOPY  09/04/2018    [facility] - bx with gastric mucosa with mild chronic inflammation, neg for h/pylori    EYE SURGERY  2001    INSERTION OF TUNNELED CENTRAL VENOUS CATHETER (CVC) WITH SUBCUTANEOUS PORT Left 07/27/2021    Procedure: INSERTION, PORT-A-CATH;  Surgeon: Marv Pearl MD;  Location: [facility] CATH LAB;  Service: Radiology;  Laterality: Left;  PICC LINE PLACEMENT    INSERTION OF TUNNELED CENTRAL VENOUS CATHETER (CVC) WITH SUBCUTANEOUS PORT Right 08/20/2021    Procedure: INSERTION, PORT-A-CATH;  Surgeon: Marv Pearl MD;  Location: [facility] CATH LAB;  Service: Radiology;  Laterality: Right;  PICC CHECK/REPLACEMENT    LUNG REMOVAL, PARTIAL Right 11/01/2022    right lower lobe    partial parathyroidectomy Right     PERIPHERALLY INSERTED CENTRAL CATHETER INSERTION  05/07/2019    Procedure: Insertion-picc;  Surgeon: Kendrick Mccollum MD;  Location: [facility] CATH LAB;  Service: Radiology;;    PERIPHERALLY INSERTED CENTRAL CATHETER INSERTION Right 08/11/2022    Procedure: INSERTION, PICC;  Surgeon: Marv Pearl MD;  Location: [facility] CATH LAB;  Service: Radiology;  Laterality: Right;  TUNNELED PICC PLACEMENT???    REMOVAL OF PERIPHERALLY INSERTED CENTRAL CATHETER Left 08/20/2021    Procedure: Removal-catheter-picc;  Surgeon: Marv Pearl MD;  Location: [facility] CATH LAB;  Service: Radiology;  Laterality: Left;    TONSILLECTOMY,  ADENOIDECTOMY  1963    TOTAL ABDOMINAL HYSTERECTOMY  1990    TUBAL LIGATION  1979     Family History   Problem Relation Age of Onset    Stroke Mother 86    Heart attack Father 67    Heart disease Father      Social History     Tobacco Use    Smoking status: Never    Smokeless tobacco: Never   Substance Use Topics    Alcohol use: Yes     Alcohol/week: 1.0 standard drink     Types: 1 Glasses of wine per week     Comment: weekly    Drug use: No       Review of Systems:  Constitutional/General:No fever, chills, change in appetite or weight loss.  Hematological/Immuno: no known coagulopathies  Respiratory: no shortness of breath  Cardiovascular: no chest pain  Gastrointestinal: no abdominal pain  Genito-Urinary: no dysuria  Musculoskeletal: negative  Skin: Negative for rash, itching, pigmentation changes, nail or hair changes.  Neurological: no TIA or stroke symptoms  Psychiatric: normal mood/affect, good insight/judgement      OBJECTIVE:     Vital Signs Range (Last 24H):  Temp:  [97.6 °F (36.4 °C)]   Pulse:  [60]   Resp:  [18]   BP: (136)/(60)   SpO2:  [96 %]     Physical Exam:  General- Patient alert and oriented x3 in NAD  ENT- PERRLA,  Neck- No masses  CV- Regular rate and rhythm  Resp-  No increased WOB  GI- Non tender/non-distended  Extrem- No cyanosis, clubbing, edema.   Derm- No rashes, masses, or lesions noted  Neuro-  No focal deficits noted.     Physical Exam  Body mass index is 21.64 kg/m².    Scheduled Meds:   Continuous Infusions:   PRN Meds:    Allergies:   Review of patient's allergies indicates:   Allergen Reactions    Adhesive Rash     Tape & bandaids  Tape & bandaids    Albuterol Other (See Comments)     faint    Ciprofloxacin Other (See Comments)     Central nervous system issues       Labs:  No results for input(s): INR in the last 168 hours.    Invalid input(s):  PT,  PTT  No results for input(s): WBC, HGB, HCT, MCV, PLT in the last 168 hours. No results for input(s): GLU, NA, K, CL, CO2, BUN,  CREATININE, CALCIUM, MG, ALT, AST, ALBUMIN, BILITOT, BILIDIR in the last 168 hours.    Vitals (Most Recent):  Temp: 97.6 °F (36.4 °C) (12/19/22 0718)  Pulse: 60 (12/19/22 0718)  Resp: 18 (12/19/22 0718)  BP: 136/60 (12/19/22 0719)  SpO2: 96 % (12/19/22 0718)      Consent obtained    ASSESSMENT/PLAN:     Tunneled catheter removal. Local aneshtesia.    There are no hospital problems to display for this patient.          Marv Pearl MD

## 2022-12-19 NOTE — PROCEDURES
Radiology Post-Procedure Note    Pre Op Diagnosis: MAC  Post Op Diagnosis: Same    Procedure: tunneled line removal    Procedure performed by: Marv Pearl MD    Written Informed Consent Obtained: Yes  Specimen Removed: YES tunneled PICC line  Estimated Blood Loss: Minimal    Findings:   Removal of tunneled line    Patient tolerated procedure well.    @SIG@

## 2022-12-19 NOTE — DISCHARGE SUMMARY
Radiology Discharge Summary      Hospital Course: No complications    Admit Date: 12/19/2022  Discharge Date: 12/19/2022     Instructions Given to Patient: Yes  Diet: Resume prior diet  Activity: activity as tolerated    Description of Condition on Discharge: Stable  Vital Signs (Most Recent): Temp: 97.6 °F (36.4 °C) (12/19/22 0718)  Pulse: 60 (12/19/22 0718)  Resp: 18 (12/19/22 0718)  BP: 136/60 (12/19/22 0719)  SpO2: 96 % (12/19/22 0718)    Discharge Disposition: Home    Discharge Diagnosis: MAC     Follow-up: per pulmonology    @SIG@

## 2022-12-27 ENCOUNTER — SPECIALTY PHARMACY (OUTPATIENT)
Dept: PHARMACY | Facility: CLINIC | Age: 77
End: 2022-12-27
Payer: MEDICARE

## 2022-12-27 NOTE — TELEPHONE ENCOUNTER
Outgoing call to pt regarding nuzyra refill.  Pt stated she had her port removed on 12/19 and was informed by provider to hold all medications until appt on 1/3. Refill request sent to provider for nuzyra. Will follow up on 1/3 after appt. Routed to [facility].

## 2023-01-04 NOTE — TELEPHONE ENCOUNTER
Attempted (attempt 1) to contact patient to determine if she is to continue Nuzyra after f/u appt on 1/3. NA, LVM. New Rx is required if she is to continue.

## 2023-01-09 NOTE — TELEPHONE ENCOUNTER
Patient states she is currently being treated for another virus/bronchitis with another set of medications. She is currently holding Nuzyra and Sivextro per provider's instruction. She will call [facility] when she is in need of refill if she is instructed to restart Nuzyra. She knows [facility] will need new Rx since current Rx is out of refills. No other questions or concerns at this time.

## 2023-01-10 DIAGNOSIS — E78.5 HYPERLIPIDEMIA, UNSPECIFIED HYPERLIPIDEMIA TYPE: ICD-10-CM

## 2023-01-10 RX ORDER — ATORVASTATIN CALCIUM 10 MG/1
TABLET, FILM COATED ORAL
Qty: 90 TABLET | Refills: 2 | Status: SHIPPED | OUTPATIENT
Start: 2023-01-10 | End: 2023-10-07

## 2023-01-10 NOTE — TELEPHONE ENCOUNTER
Care Due:                  Date            Visit Type   Department     Provider  --------------------------------------------------------------------------------                                EP -                              PRIMARY      [facility] INTERNAL  Last Visit: 11-      CARE ([facility])   MEDICINE       Stacy Pereira                              EP -                              PRIMARY      [facility] INTERNAL  Next Visit: 02-      CARE ([facility])   MEDICINE       Stacy Pereira                                                            Last  Test          Frequency    Reason                     Performed    Due Date  --------------------------------------------------------------------------------    CMP.........  12 months..  atorvastatin,              Not Found    Overdue                             hydroCHLOROthiazide......    [employer] Embedded Care Gaps. Reference number: [id number]. 1/10/2023   11:33:43 AM CST

## 2023-01-10 NOTE — TELEPHONE ENCOUNTER
Refill Routing Note   Medication(s) are not appropriate for processing by [facility] for the following reason(s):      - Required laboratory values are outdated    [facility] action(s):  Defer Medication-related problems identified: Requires labs     Medication Therapy Plan: cmp  Medication reconciliation completed: No     Appointments  past 12m or future 3m with PCP    Date Provider   Last Visit   11/11/2022 Stacy Pereira MD   Next Visit   2/7/2023 Stacy Pereira MD   ED visits in past 90 days: 0        Note composed:12:17 PM 01/10/2023           Alert-The patient is alert, awake and responds to voice. The patient is oriented to time, place, and person. The triage nurse is able to obtain subjective information.

## 2023-01-31 ENCOUNTER — SPECIALTY PHARMACY (OUTPATIENT)
Dept: PHARMACY | Facility: CLINIC | Age: 78
End: 2023-01-31
Payer: MEDICARE

## 2023-01-31 DIAGNOSIS — A31.8 MYCOBACTERIUM ABSCESSUS INFECTION: Primary | ICD-10-CM

## 2023-01-31 DIAGNOSIS — A31.0 MYCOBACTERIAL DISEASE, PULMONARY: ICD-10-CM

## 2023-01-31 NOTE — TELEPHONE ENCOUNTER
Incoming call from the patient asking for refill on Nuzyra. Advised refill request was sent to [facility] however waiting on response. Patient has 3 days of medication remaining. [facility] to call for refill once received    Routing to assigned [facility] for f/u

## 2023-02-01 NOTE — TELEPHONE ENCOUNTER
Specialty Pharmacy - Refill Coordination    Specialty Medication Orders Linked to Encounter      Flowsheet Row Most Recent Value   Medication #1 omadacycline (NUZYRA) 150 mg Tab (Order#[id number], Rx#[id number])            Refill Questions - Documented Responses      Flowsheet Row Most Recent Value   Patient Availability and HIPAA Verification    Does patient want to proceed with activity? Yes   HIPAA/medical authority confirmed? Yes   Relationship to patient of person spoken to? Self   Refill Screening Questions    Changes to allergies? No   Changes to medications? No   New conditions since last clinic visit? No   Unplanned office visit, urgent care, ED, or hospital admission in the last 4 weeks? No   How does patient/caregiver feel medication is working? Good   Financial problems or insurance changes? No   How many doses of your specialty medications were missed in the last 4 weeks? 0   Would patient like to speak to a pharmacist? No   When does the patient need to receive the medication? 02/02/23   Refill Delivery Questions    How will the patient receive the medication? MEDRx   When does the patient need to receive the medication? 02/02/23   Shipping Address Home   Address in [facility] confirmed and updated if neccessary? Yes   Expected Copay ($) 2048.69   Is the patient able to afford the medication copay? Yes   Payment Method CC on file   Days supply of Refill 15   Supplies needed? No supplies needed   Refill activity completed? Yes   Refill activity plan Refill scheduled   Shipment/Pickup Date: 02/01/23            Current Outpatient Medications   Medication Sig    amikacin 1,000 mg/4 mL Soln     atorvastatin (LIPITOR) 10 MG tablet TAKE 1 TABLET BY MOUTH EVERY DAY    biotin 1 mg tablet Take 1,000 mcg by mouth 3 (three) times daily.    calcium phosphate trib/vit D3 (CALTRATE GUMMY BITES ORAL) Take 2 tablets by mouth once daily.    celecoxib (CELEBREX) 200 MG capsule Take 200 mg by mouth.    CLOFAZIMINE  ORAL Take 50 mg by mouth 2 (two) times a day.    hydroCHLOROthiazide (HYDRODIURIL) 25 MG tablet Take 1 tablet (25 mg total) by mouth once daily.    metoprolol succinate (TOPROL-XL) 50 MG 24 hr tablet Take 1 tablet (50 mg total) by mouth once daily.    mv-min/iron/folic/calcium/vitK (WOMEN'S MULTIVITAMIN ORAL) Take 1 tablet by mouth once daily.    omadacycline (NUZYRA) 150 mg Tab Take 1 tablet (150 mg) by mouth twice daily after meals for chronic pulmonary infection.    pregabalin (LYRICA) 25 MG capsule Take 25 mg by mouth 2 (two) times a day.    tedizolid (SIVEXTRO) 200 mg Tab Take 1 tablet Every Monday, Wednesday, and Friday by mouth   Last reviewed on 12/19/2022  7:13 AM by Alison Beckman RN    Review of patient's allergies indicates:   Allergen Reactions    Adhesive Rash     Tape & bandaids  Tape & bandaids    Albuterol Other (See Comments)     faint    Ciprofloxacin Other (See Comments)     Central nervous system issues    Last reviewed on  12/19/2022 7:07 AM by Alison Beckman      Tasks added this encounter   No tasks added.   Tasks due within next 3 months   No tasks due.     Stephanie Yost, PharmD  [facility]  [address] [place], [place] [address]  [facility] [address]  Phone: [phone]  Fax: [phone]

## 2023-02-01 NOTE — TELEPHONE ENCOUNTER
New order for Nuzyra called in by Dr. Taylor and entered into [facility].      Outgoing call to pt to inform her approval received.  Pt confirmed she has restarted Nuzyra.  Informed pt of high copay.  Pt agreed to pay copay amount.  Proceeding with refill.

## 2023-02-06 NOTE — PROGRESS NOTES
"Subjective:   Patient ID: Marly Maciel is a 77 y.o. female  Chief complaint:   Chief Complaint   Patient presents with    Hypertension     F/u       HPI  Here for 3 month follow up    Walking 1.5 mi on most days     HTN:  Bb and hctz 3 times per week  Home bp at goal     No need ekg as not on clofazamine at this time - if resume will let me know and will reorder ekg at that time to check q 1-3 months moving forward    Had flat mole left suprapubic region   No drainage   Not itchy   Just noticed mole - fairly new   Derm: [facility]     Had bronchitis after Christmas 26 out of 33 were ill with res illness - given doxy and pdn and cough med   - reports fever for 1 week   - stopped all abx meds for 3-4 weeks  - now 6 weeks ago she resumed abx  - to f/u with pulm 2/21      - cough better - will have times will notice wheezing   - will get bronchospastic cough  - allx to albuterol     At v:   Had lobectomy and here for removal of sutures from chest tube 11/1/2022 - CT tube removecd 11/2  No drainage and no pain    Staying on iv amikacin for 1 more month - started yesterday  Then to switch to inhaled amikacin  Then to start on 3 oral meds for 1 year     [facility]    Atherosclerosis, HLD:   sunshine statin     HM:   Shingrix     Review of Systems    Objective:  Vitals:    02/07/23 0817 02/07/23 0851   BP: (!) 144/82 124/72   BP Location: Left arm    Patient Position: Sitting    Pulse: 73    SpO2: 98%    Weight: 59.3 kg (130 lb 11.7 oz)    Height: 5' 5" (1.651 m)      Body mass index is 21.76 kg/m².    Physical Exam  Vitals reviewed.   Constitutional:       Appearance: Normal appearance. She is well-developed.   HENT:      Head: Normocephalic and atraumatic.      Right Ear: Tympanic membrane, ear canal and external ear normal.      Left Ear: Tympanic membrane, ear canal and external ear normal.      Nose: Nose normal. No congestion.      Mouth/Throat:      Mouth: Mucous membranes are " moist.      Pharynx: Oropharynx is clear. No oropharyngeal exudate.   Eyes:      Extraocular Movements: Extraocular movements intact.      Conjunctiva/sclera: Conjunctivae normal.   Neck:      Thyroid: No thyromegaly.   Cardiovascular:      Rate and Rhythm: Normal rate and regular rhythm.      Pulses: Normal pulses.      Heart sounds: Normal heart sounds.   Pulmonary:      Effort: Pulmonary effort is normal. No respiratory distress.      Breath sounds: Normal breath sounds. No stridor. No wheezing or rales.      Comments: Talking in complete sentences  Abdominal:      General: Bowel sounds are normal.      Palpations: Abdomen is soft.   Musculoskeletal:         General: No swelling or tenderness.      Cervical back: Neck supple.   Lymphadenopathy:      Cervical: No cervical adenopathy.   Skin:     General: Skin is warm and dry.      Capillary Refill: Capillary refill takes less than 2 seconds.   Neurological:      General: No focal deficit present.      Mental Status: She is alert and oriented to person, place, and time. Mental status is at baseline.   Psychiatric:         Behavior: Behavior normal.         Thought Content: Thought content normal.       Assessment:  1. Hyperlipidemia, unspecified hyperlipidemia type    2. Hypertension, unspecified type    3. Palpitations    4. Atherosclerosis of aorta    5. Mycobacterium avium complex    6. Status post partial lobectomy of lung          Plan:  Marly was seen today for hypertension.    Diagnoses and all orders for this visit:    Hyperlipidemia, unspecified hyperlipidemia type  Stable   Cont statin     Hypertension, unspecified type  -     hydroCHLOROthiazide (HYDRODIURIL) 25 MG tablet; Take 1 tablet (25 mg total) by mouth once daily.  Stable   Cont med     Palpitations  -     metoprolol succinate (TOPROL-XL) 50 MG 24 hr tablet; Take 1 tablet (50 mg total) by mouth once daily.  Stable   Cont bb     Atherosclerosis of aorta  Stable   Cont statin     Mycobacterium avium  complex  Stable   Cont f/u with specialists     Status post partial lobectomy of lung  Stable   Cont f/u with specialists     Health Maintenance   Topic Date Due    Mammogram  06/02/2023    High Dose Statin  02/07/2024    DEXA Scan  07/06/2025    TETANUS VACCINE  06/14/2026    Lipid Panel  07/05/2027    Hepatitis C Screening  Completed

## 2023-02-07 ENCOUNTER — OFFICE VISIT (OUTPATIENT)
Dept: INTERNAL MEDICINE | Facility: CLINIC | Age: 78
End: 2023-02-07
Attending: INTERNAL MEDICINE
Payer: MEDICARE

## 2023-02-07 VITALS
HEIGHT: 65 IN | DIASTOLIC BLOOD PRESSURE: 72 MMHG | HEART RATE: 73 BPM | SYSTOLIC BLOOD PRESSURE: 124 MMHG | BODY MASS INDEX: 21.79 KG/M2 | WEIGHT: 130.75 LBS | OXYGEN SATURATION: 98 %

## 2023-02-07 DIAGNOSIS — E78.5 HYPERLIPIDEMIA, UNSPECIFIED HYPERLIPIDEMIA TYPE: Primary | ICD-10-CM

## 2023-02-07 DIAGNOSIS — R00.2 PALPITATIONS: ICD-10-CM

## 2023-02-07 DIAGNOSIS — Z90.2 STATUS POST PARTIAL LOBECTOMY OF LUNG: ICD-10-CM

## 2023-02-07 DIAGNOSIS — I10 HYPERTENSION, UNSPECIFIED TYPE: ICD-10-CM

## 2023-02-07 DIAGNOSIS — I70.0 ATHEROSCLEROSIS OF AORTA: ICD-10-CM

## 2023-02-07 DIAGNOSIS — A31.0 MYCOBACTERIUM AVIUM COMPLEX: ICD-10-CM

## 2023-02-07 PROCEDURE — 99213 OFFICE O/P EST LOW 20 MIN: CPT | Mod: PBBFAC | Performed by: INTERNAL MEDICINE

## 2023-02-07 PROCEDURE — 99999 PR PBB SHADOW E&M-EST. PATIENT-LVL III: CPT | Mod: PBBFAC,,, | Performed by: INTERNAL MEDICINE

## 2023-02-07 PROCEDURE — 99214 PR OFFICE/OUTPT VISIT, EST, LEVL IV, 30-39 MIN: ICD-10-PCS | Mod: S$PBB,,, | Performed by: INTERNAL MEDICINE

## 2023-02-07 PROCEDURE — 99999 PR PBB SHADOW E&M-EST. PATIENT-LVL III: ICD-10-PCS | Mod: PBBFAC,,, | Performed by: INTERNAL MEDICINE

## 2023-02-07 PROCEDURE — 99214 OFFICE O/P EST MOD 30 MIN: CPT | Mod: S$PBB,,, | Performed by: INTERNAL MEDICINE

## 2023-02-07 RX ORDER — HYDROCHLOROTHIAZIDE 25 MG/1
25 TABLET ORAL DAILY
Qty: 90 TABLET | Refills: 3 | Status: SHIPPED | OUTPATIENT
Start: 2023-02-07 | End: 2024-03-19

## 2023-02-07 RX ORDER — METOPROLOL SUCCINATE 50 MG/1
50 TABLET, EXTENDED RELEASE ORAL DAILY
Qty: 90 TABLET | Refills: 3 | Status: SHIPPED | OUTPATIENT
Start: 2023-02-07 | End: 2024-03-13 | Stop reason: SDUPTHER

## 2023-02-10 ENCOUNTER — SPECIALTY PHARMACY (OUTPATIENT)
Dept: PHARMACY | Facility: CLINIC | Age: 78
End: 2023-02-10
Payer: MEDICARE

## 2023-02-10 NOTE — TELEPHONE ENCOUNTER
Outgoing call regarding Nuzyra refill. Insurance rejected RTS till 2/13/23, pt agreed a call back on 2/13/23 for follow up.

## 2023-02-14 NOTE — TELEPHONE ENCOUNTER
Specialty Pharmacy - Refill Coordination    Specialty Medication Orders Linked to Encounter      Flowsheet Row Most Recent Value   Medication #1 omadacycline (NUZYRA) 150 mg Tab (Order#[id number], Rx#[id number])            Refill Questions - Documented Responses      Flowsheet Row Most Recent Value   Patient Availability and HIPAA Verification    Does patient want to proceed with activity? Yes   HIPAA/medical authority confirmed? Yes   Relationship to patient of person spoken to? Self   Refill Screening Questions    Changes to allergies? No   Changes to medications? No   New conditions since last clinic visit? No   Unplanned office visit, urgent care, ED, or hospital admission in the last 4 weeks? No   How does patient/caregiver feel medication is working? Good   Financial problems or insurance changes? No   How many doses of your specialty medications were missed in the last 4 weeks? 0   Would patient like to speak to a pharmacist? No   When does the patient need to receive the medication? 02/17/23   Refill Delivery Questions    How will the patient receive the medication? MEDRx   When does the patient need to receive the medication? 02/17/23   Shipping Address Home   Address in [facility] confirmed and updated if neccessary? Yes   Expected Copay ($) 398.14   Is the patient able to afford the medication copay? Yes   Payment Method CC on file   Days supply of Refill 15   Supplies needed? No supplies needed   Refill activity completed? Yes   Refill activity plan Refill scheduled   Shipment/Pickup Date: 02/16/23            Current Outpatient Medications   Medication Sig    amikacin 1,000 mg/4 mL Soln     amikacin liposomal/neb.accessr (ARIKAYCE INHL) Inhale into the lungs.    atorvastatin (LIPITOR) 10 MG tablet TAKE 1 TABLET BY MOUTH EVERY DAY    biotin 1 mg tablet Take 1,000 mcg by mouth 3 (three) times daily.    calcium phosphate trib/vit D3 (CALTRATE GUMMY BITES ORAL) Take 2 tablets by mouth once daily.     celecoxib (CELEBREX) 200 MG capsule Take 200 mg by mouth.    CLOFAZIMINE ORAL Take 50 mg by mouth 2 (two) times a day.    hydroCHLOROthiazide (HYDRODIURIL) 25 MG tablet Take 1 tablet (25 mg total) by mouth once daily.    metoprolol succinate (TOPROL-XL) 50 MG 24 hr tablet Take 1 tablet (50 mg total) by mouth once daily.    mv-min/iron/folic/calcium/vitK (WOMEN'S MULTIVITAMIN ORAL) Take 1 tablet by mouth once daily.    omadacycline (NUZYRA) 150 mg Tab Take 1 tablet (150 mg) by mouth twice daily after meals for chronic pulmonary infection.    tedizolid (SIVEXTRO) 200 mg Tab Take 1 tablet Every Monday, Wednesday, and Friday by mouth   Last reviewed on 2/8/2023  7:27 PM by Stacy Pereira MD    Review of patient's allergies indicates:   Allergen Reactions    Adhesive Rash     Tape & bandaids  Tape & bandaids    Albuterol Other (See Comments)     faint    Ciprofloxacin Other (See Comments)     Central nervous system issues    Last reviewed on  2/8/2023 7:22 PM by Stacy Pereira      Tasks added this encounter   2/25/2023 - Refill Call (Auto Added)   Tasks due within next 3 months   No tasks due.     [facility]  [address] [place] [address]  Phone: [phone]  Fax: [phone]

## 2023-02-23 ENCOUNTER — TELEPHONE (OUTPATIENT)
Dept: FAMILY MEDICINE | Facility: CLINIC | Age: 78
End: 2023-02-23
Payer: MEDICARE

## 2023-02-23 NOTE — TELEPHONE ENCOUNTER
Left a voicemail message to inform the Patient about the EAWV appointment and to schedule.  Requested the Patient to call the office back to be schedule.  Sent a detailed message thru [employer] as well.

## 2023-02-27 ENCOUNTER — SPECIALTY PHARMACY (OUTPATIENT)
Dept: PHARMACY | Facility: CLINIC | Age: 78
End: 2023-02-27
Payer: MEDICARE

## 2023-02-27 NOTE — TELEPHONE ENCOUNTER
Specialty Pharmacy - Refill Coordination    Specialty Medication Orders Linked to Encounter      Flowsheet Row Most Recent Value   Medication #1 omadacycline (NUZYRA) 150 mg Tab (Order#[id number], Rx#[id number])            Refill Questions - Documented Responses      Flowsheet Row Most Recent Value   Patient Availability and HIPAA Verification    Does patient want to proceed with activity? Yes   HIPAA/medical authority confirmed? Yes   Relationship to patient of person spoken to? Self   Refill Screening Questions    Changes to allergies? No   Changes to medications? No   New conditions since last clinic visit? No   How does patient/caregiver feel medication is working? Good   Financial problems or insurance changes? No   How many doses of your specialty medications were missed in the last 4 weeks? 0   Would patient like to speak to a pharmacist? No   When does the patient need to receive the medication? 03/01/23   Refill Delivery Questions    How will the patient receive the medication? MEDRx   When does the patient need to receive the medication? 03/01/23   Shipping Address Home   Address in [facility] confirmed and updated if neccessary? Yes   Expected Copay ($) 398.14   Is the patient able to afford the medication copay? Yes   Payment Method CC on file   Days supply of Refill 15   Refill activity completed? Yes   Refill activity plan Refill scheduled   Shipment/Pickup Date: 02/28/23            Current Outpatient Medications   Medication Sig    amikacin 1,000 mg/4 mL Soln     amikacin liposomal/neb.accessr (ARIKAYCE INHL) Inhale into the lungs.    atorvastatin (LIPITOR) 10 MG tablet TAKE 1 TABLET BY MOUTH EVERY DAY    biotin 1 mg tablet Take 1,000 mcg by mouth 3 (three) times daily.    calcium phosphate trib/vit D3 (CALTRATE GUMMY BITES ORAL) Take 2 tablets by mouth once daily.    celecoxib (CELEBREX) 200 MG capsule Take 200 mg by mouth.    CLOFAZIMINE ORAL Take 50 mg by mouth 2 (two) times a day.     hydroCHLOROthiazide (HYDRODIURIL) 25 MG tablet Take 1 tablet (25 mg total) by mouth once daily.    metoprolol succinate (TOPROL-XL) 50 MG 24 hr tablet Take 1 tablet (50 mg total) by mouth once daily.    mv-min/iron/folic/calcium/vitK (WOMEN'S MULTIVITAMIN ORAL) Take 1 tablet by mouth once daily.    omadacycline (NUZYRA) 150 mg Tab Take 1 tablet (150 mg) by mouth twice daily after meals for chronic pulmonary infection.    tedizolid (SIVEXTRO) 200 mg Tab Take 1 tablet Every Monday, Wednesday, and Friday by mouth   Last reviewed on 2/8/2023  7:27 PM by Stacy Pereira MD    Review of patient's allergies indicates:   Allergen Reactions    Adhesive Rash     Tape & bandaids  Tape & bandaids    Albuterol Other (See Comments)     faint    Ciprofloxacin Other (See Comments)     Central nervous system issues    Last reviewed on  2/8/2023 7:22 PM by Stacy Pereira      Tasks added this encounter   3/8/2023 - Refill Call (Auto Added)   Tasks due within next 3 months   No tasks due.     [facility]  [address] [place] [address]  Phone: [phone]  Fax: [phone]

## 2023-03-08 ENCOUNTER — SPECIALTY PHARMACY (OUTPATIENT)
Dept: PHARMACY | Facility: CLINIC | Age: 78
End: 2023-03-08
Payer: MEDICARE

## 2023-03-13 ENCOUNTER — PES CALL (OUTPATIENT)
Dept: ADMINISTRATIVE | Facility: CLINIC | Age: 78
End: 2023-03-13
Payer: MEDICARE

## 2023-03-13 NOTE — TELEPHONE ENCOUNTER
Specialty Pharmacy - Refill Coordination    Specialty Medication Orders Linked to Encounter      Flowsheet Row Most Recent Value   Medication #1 omadacycline (NUZYRA) 150 mg Tab (Order#[id number], Rx#[id number])            Refill Questions - Documented Responses      Flowsheet Row Most Recent Value   Patient Availability and HIPAA Verification    Does patient want to proceed with activity? Yes   HIPAA/medical authority confirmed? Yes   Relationship to patient of person spoken to? Self   Refill Screening Questions    Changes to allergies? No   Changes to medications? No   New conditions since last clinic visit? No   Unplanned office visit, urgent care, ED, or hospital admission in the last 4 weeks? No   How does patient/caregiver feel medication is working? Good   Financial problems or insurance changes? No   How many doses of your specialty medications were missed in the last 4 weeks? 0   Would patient like to speak to a pharmacist? No   When does the patient need to receive the medication? 03/16/23   Refill Delivery Questions    How will the patient receive the medication? MEDRx   When does the patient need to receive the medication? 03/16/23   Shipping Address Home   Address in [facility] confirmed and updated if neccessary? Yes   Expected Copay ($) 398.14   Is the patient able to afford the medication copay? Yes   Payment Method CC on file   Days supply of Refill 15   Supplies needed? No supplies needed   Refill activity completed? Yes   Refill activity plan Refill scheduled   Shipment/Pickup Date: 03/14/23            Current Outpatient Medications   Medication Sig    amikacin 1,000 mg/4 mL Soln     amikacin liposomal/neb.accessr (ARIKAYCE INHL) Inhale into the lungs.    atorvastatin (LIPITOR) 10 MG tablet TAKE 1 TABLET BY MOUTH EVERY DAY    biotin 1 mg tablet Take 1,000 mcg by mouth 3 (three) times daily.    calcium phosphate trib/vit D3 (CALTRATE GUMMY BITES ORAL) Take 2 tablets by mouth once daily.     celecoxib (CELEBREX) 200 MG capsule Take 200 mg by mouth.    CLOFAZIMINE ORAL Take 50 mg by mouth 2 (two) times a day.    hydroCHLOROthiazide (HYDRODIURIL) 25 MG tablet Take 1 tablet (25 mg total) by mouth once daily.    metoprolol succinate (TOPROL-XL) 50 MG 24 hr tablet Take 1 tablet (50 mg total) by mouth once daily.    mv-min/iron/folic/calcium/vitK (WOMEN'S MULTIVITAMIN ORAL) Take 1 tablet by mouth once daily.    omadacycline (NUZYRA) 150 mg Tab Take 1 tablet (150 mg) by mouth twice daily after meals for chronic pulmonary infection.    tedizolid (SIVEXTRO) 200 mg Tab Take 1 tablet Every Monday, Wednesday, and Friday by mouth   Last reviewed on 2/8/2023  7:27 PM by Stacy Pereira MD    Review of patient's allergies indicates:   Allergen Reactions    Adhesive Rash     Tape & bandaids  Tape & bandaids    Albuterol Other (See Comments)     faint    Ciprofloxacin Other (See Comments)     Central nervous system issues    Last reviewed on  2/8/2023 7:22 PM by Stacy Pereira      Tasks added this encounter   3/24/2023 - Refill Call (Auto Added)   Tasks due within next 3 months   No tasks due.     Selene Lovelace, Patient Care Assistant  [facility]  [address] [place], [place] [address]  [facility] [address]  Phone: [phone]  Fax: [phone]

## 2023-03-24 ENCOUNTER — SPECIALTY PHARMACY (OUTPATIENT)
Dept: PHARMACY | Facility: CLINIC | Age: 78
End: 2023-03-24
Payer: MEDICARE

## 2023-03-26 ENCOUNTER — PATIENT MESSAGE (OUTPATIENT)
Dept: INTERNAL MEDICINE | Facility: CLINIC | Age: 78
End: 2023-03-26
Payer: MEDICARE

## 2023-03-26 DIAGNOSIS — Z51.81 ENCOUNTER FOR MEDICATION MONITORING: Primary | ICD-10-CM

## 2023-03-27 NOTE — TELEPHONE ENCOUNTER
No problem - I ordered a cmp as a standing order to be completed every month   - I am sorry to hear about the pneumonia but relieved to hear that there is improvement overall in her lungs!

## 2023-03-29 ENCOUNTER — LAB VISIT (OUTPATIENT)
Dept: LAB | Facility: HOSPITAL | Age: 78
End: 2023-03-29
Attending: INTERNAL MEDICINE
Payer: MEDICARE

## 2023-03-29 DIAGNOSIS — Z51.81 ENCOUNTER FOR MEDICATION MONITORING: ICD-10-CM

## 2023-03-29 LAB
ALBUMIN SERPL BCP-MCNC: 3.6 G/DL (ref 3.5–5.2)
ALP SERPL-CCNC: 66 U/L (ref 55–135)
ALT SERPL W/O P-5'-P-CCNC: 20 U/L (ref 10–44)
ANION GAP SERPL CALC-SCNC: 8 MMOL/L (ref 8–16)
AST SERPL-CCNC: 30 U/L (ref 10–40)
BILIRUB SERPL-MCNC: 0.3 MG/DL (ref 0.1–1)
BUN SERPL-MCNC: 19 MG/DL (ref 8–23)
CALCIUM SERPL-MCNC: 9.3 MG/DL (ref 8.7–10.5)
CHLORIDE SERPL-SCNC: 103 MMOL/L (ref 95–110)
CO2 SERPL-SCNC: 30 MMOL/L (ref 23–29)
CREAT SERPL-MCNC: 0.9 MG/DL (ref 0.5–1.4)
EST. GFR  (NO RACE VARIABLE): >60 ML/MIN/1.73 M^2
GLUCOSE SERPL-MCNC: 96 MG/DL (ref 70–110)
POTASSIUM SERPL-SCNC: 4 MMOL/L (ref 3.5–5.1)
PROT SERPL-MCNC: 6.6 G/DL (ref 6–8.4)
SODIUM SERPL-SCNC: 141 MMOL/L (ref 136–145)

## 2023-03-29 PROCEDURE — 36415 COLL VENOUS BLD VENIPUNCTURE: CPT | Mod: PN | Performed by: INTERNAL MEDICINE

## 2023-03-29 PROCEDURE — 80053 COMPREHEN METABOLIC PANEL: CPT | Performed by: INTERNAL MEDICINE

## 2023-03-31 NOTE — TELEPHONE ENCOUNTER
Specialty Pharmacy - Refill Coordination    Specialty Medication Orders Linked to Encounter      Flowsheet Row Most Recent Value   Medication #1 omadacycline (NUZYRA) 150 mg Tab (Order#[id number], Rx#[id number])            Refill Questions - Documented Responses      Flowsheet Row Most Recent Value   Patient Availability and HIPAA Verification    Does patient want to proceed with activity? Yes   HIPAA/medical authority confirmed? Yes   Relationship to patient of person spoken to? Self   Refill Screening Questions    Changes to allergies? No   Changes to medications? No   New conditions since last clinic visit? No   Unplanned office visit, urgent care, ED, or hospital admission in the last 4 weeks? No   How does patient/caregiver feel medication is working? Good   Financial problems or insurance changes? No   How many doses of your specialty medications were missed in the last 4 weeks? 0   Would patient like to speak to a pharmacist? No   When does the patient need to receive the medication? 04/05/23   Refill Delivery Questions    How will the patient receive the medication? MEDRx   When does the patient need to receive the medication? 04/05/23   Shipping Address Home   Address in [facility] confirmed and updated if neccessary? Yes   Expected Copay ($) 398.14   Is the patient able to afford the medication copay? Yes   Payment Method CC on file   Days supply of Refill 15   Refill activity completed? Yes   Refill activity plan Refill scheduled   Shipment/Pickup Date: 04/03/23            Current Outpatient Medications   Medication Sig    amikacin 1,000 mg/4 mL Soln     amikacin liposomal/neb.accessr (ARIKAYCE INHL) Inhale into the lungs.    atorvastatin (LIPITOR) 10 MG tablet TAKE 1 TABLET BY MOUTH EVERY DAY    biotin 1 mg tablet Take 1,000 mcg by mouth 3 (three) times daily.    calcium phosphate trib/vit D3 (CALTRATE GUMMY BITES ORAL) Take 2 tablets by mouth once daily.    celecoxib (CELEBREX) 200 MG capsule  Take 200 mg by mouth.    CLOFAZIMINE ORAL Take 50 mg by mouth 2 (two) times a day.    hydroCHLOROthiazide (HYDRODIURIL) 25 MG tablet Take 1 tablet (25 mg total) by mouth once daily.    metoprolol succinate (TOPROL-XL) 50 MG 24 hr tablet Take 1 tablet (50 mg total) by mouth once daily.    mv-min/iron/folic/calcium/vitK (WOMEN'S MULTIVITAMIN ORAL) Take 1 tablet by mouth once daily.    omadacycline (NUZYRA) 150 mg Tab Take 1 tablet (150 mg) by mouth twice daily after meals for chronic pulmonary infection.    tedizolid (SIVEXTRO) 200 mg Tab Take 1 tablet Every Monday, Wednesday, and Friday by mouth   Last reviewed on 2/8/2023  7:27 PM by Stacy Pereira MD    Review of patient's allergies indicates:   Allergen Reactions    Adhesive Rash     Tape & bandaids  Tape & bandaids    Albuterol Other (See Comments)     faint    Ciprofloxacin Other (See Comments)     Central nervous system issues    Last reviewed on  2/8/2023 7:22 PM by Stacy Pereira      Tasks added this encounter   4/11/2023 - Refill Call (Auto Added)   Tasks due within next 3 months   No tasks due.     [facility]  [address] [place] [id number]  Phone: [phone]  Fax: [phone]

## 2023-04-11 ENCOUNTER — SPECIALTY PHARMACY (OUTPATIENT)
Dept: PHARMACY | Facility: CLINIC | Age: 78
End: 2023-04-11
Payer: MEDICARE

## 2023-04-11 NOTE — TELEPHONE ENCOUNTER
Outgoing call regarding Nuzyra refill, pt decline refill at the moment stating she just opened the last refill, and has used only 2 to 3 days and would like a follow up on next week.

## 2023-04-17 NOTE — TELEPHONE ENCOUNTER
Specialty Pharmacy - Refill Coordination    Specialty Medication Orders Linked to Encounter      Flowsheet Row Most Recent Value   Medication #1 omadacycline (NUZYRA) 150 mg Tab (Order#[id number], Rx#[id number])            Refill Questions - Documented Responses      Flowsheet Row Most Recent Value   Patient Availability and HIPAA Verification    Does patient want to proceed with activity? Yes   HIPAA/medical authority confirmed? Yes   Relationship to patient of person spoken to? Self   Refill Screening Questions    Changes to allergies? No   Changes to medications? No   New conditions since last clinic visit? No   Unplanned office visit, urgent care, ED, or hospital admission in the last 4 weeks? No   How does patient/caregiver feel medication is working? Good   Financial problems or insurance changes? No   How many doses of your specialty medications were missed in the last 4 weeks? 1   Why were doses missed? Other (comment)  [Busy after Easter Sunday]   Would patient like to speak to a pharmacist? No   When does the patient need to receive the medication? 04/21/23   Refill Delivery Questions    How will the patient receive the medication? MEDRx   When does the patient need to receive the medication? 04/21/23   Shipping Address Home   Address in [facility] confirmed and updated if neccessary? Yes   Expected Copay ($) 398.14   Is the patient able to afford the medication copay? Yes   Payment Method CC on file   Days supply of Refill 15   Supplies needed? No supplies needed   Refill activity completed? Yes   Refill activity plan Refill scheduled   Shipment/Pickup Date: 04/19/23            Current Outpatient Medications   Medication Sig    amikacin 1,000 mg/4 mL Soln     amikacin liposomal/neb.accessr (ARIKAYCE INHL) Inhale into the lungs.    atorvastatin (LIPITOR) 10 MG tablet TAKE 1 TABLET BY MOUTH EVERY DAY    biotin 1 mg tablet Take 1,000 mcg by mouth 3 (three) times daily.    calcium phosphate trib/vit D3  (CALTRATE GUMMY BITES ORAL) Take 2 tablets by mouth once daily.    celecoxib (CELEBREX) 200 MG capsule Take 200 mg by mouth.    CLOFAZIMINE ORAL Take 50 mg by mouth 2 (two) times a day.    hydroCHLOROthiazide (HYDRODIURIL) 25 MG tablet Take 1 tablet (25 mg total) by mouth once daily.    metoprolol succinate (TOPROL-XL) 50 MG 24 hr tablet Take 1 tablet (50 mg total) by mouth once daily.    mv-min/iron/folic/calcium/vitK (WOMEN'S MULTIVITAMIN ORAL) Take 1 tablet by mouth once daily.    omadacycline (NUZYRA) 150 mg Tab Take 1 tablet (150 mg) by mouth twice daily after meals for chronic pulmonary infection.    tedizolid (SIVEXTRO) 200 mg Tab Take 1 tablet Every Monday, Wednesday, and Friday by mouth   Last reviewed on 2/8/2023  7:27 PM by Stacy Pereira MD    Review of patient's allergies indicates:   Allergen Reactions    Adhesive Rash     Tape & bandaids  Tape & bandaids    Albuterol Other (See Comments)     faint    Ciprofloxacin Other (See Comments)     Central nervous system issues    Last reviewed on  2/8/2023 7:22 PM by Stacy Pereira      Tasks added this encounter   4/28/2023 - Refill Call (Auto Added)   Tasks due within next 3 months   No tasks due.     Maribell Fulton [facility]  [address] [place] [address]  Phone: [phone]  Fax: [phone]

## 2023-04-28 ENCOUNTER — PATIENT MESSAGE (OUTPATIENT)
Dept: PHARMACY | Facility: CLINIC | Age: 78
End: 2023-04-28
Payer: MEDICARE

## 2023-05-01 ENCOUNTER — SPECIALTY PHARMACY (OUTPATIENT)
Dept: PHARMACY | Facility: CLINIC | Age: 78
End: 2023-05-01
Payer: MEDICARE

## 2023-05-01 NOTE — TELEPHONE ENCOUNTER
Specialty Pharmacy - Refill Coordination    Specialty Medication Orders Linked to Encounter      Flowsheet Row Most Recent Value   Medication #1 omadacycline (NUZYRA) 150 mg Tab (Order#[id number], Rx#[id number])            Refill Questions - Documented Responses      Flowsheet Row Most Recent Value   Patient Availability and HIPAA Verification    Does patient want to proceed with activity? Yes   HIPAA/medical authority confirmed? Yes   Relationship to patient of person spoken to? Self   Refill Screening Questions    Changes to allergies? No   Changes to medications? No   New conditions since last clinic visit? No   Unplanned office visit, urgent care, ED, or hospital admission in the last 4 weeks? No   How does patient/caregiver feel medication is working? Good   Financial problems or insurance changes? No   How many doses of your specialty medications were missed in the last 4 weeks? 0   Would patient like to speak to a pharmacist? No   When does the patient need to receive the medication? 05/04/23   Refill Delivery Questions    How will the patient receive the medication? MEDRx   When does the patient need to receive the medication? 05/04/23   Shipping Address Home   Address in [facility] confirmed and updated if neccessary? Yes   Expected Copay ($) 398.14   Is the patient able to afford the medication copay? Yes   Payment Method CC on file   Days supply of Refill 15   Supplies needed? No supplies needed   Refill activity completed? Yes   Refill activity plan Refill scheduled   Shipment/Pickup Date: 05/03/23            Current Outpatient Medications   Medication Sig    amikacin 1,000 mg/4 mL Soln     amikacin liposomal/neb.accessr (ARIKAYCE INHL) Inhale into the lungs.    atorvastatin (LIPITOR) 10 MG tablet TAKE 1 TABLET BY MOUTH EVERY DAY    biotin 1 mg tablet Take 1,000 mcg by mouth 3 (three) times daily.    calcium phosphate trib/vit D3 (CALTRATE GUMMY BITES ORAL) Take 2 tablets by mouth once daily.     celecoxib (CELEBREX) 200 MG capsule Take 200 mg by mouth.    CLOFAZIMINE ORAL Take 50 mg by mouth 2 (two) times a day.    hydroCHLOROthiazide (HYDRODIURIL) 25 MG tablet Take 1 tablet (25 mg total) by mouth once daily.    metoprolol succinate (TOPROL-XL) 50 MG 24 hr tablet Take 1 tablet (50 mg total) by mouth once daily.    mv-min/iron/folic/calcium/vitK (WOMEN'S MULTIVITAMIN ORAL) Take 1 tablet by mouth once daily.    omadacycline (NUZYRA) 150 mg Tab Take 1 tablet (150 mg) by mouth twice daily after meals for chronic pulmonary infection.    tedizolid (SIVEXTRO) 200 mg Tab Take 1 tablet Every Monday, Wednesday, and Friday by mouth   Last reviewed on 2/8/2023  7:27 PM by Stacy Pereira MD    Review of patient's allergies indicates:   Allergen Reactions    Adhesive Rash     Tape & bandaids  Tape & bandaids    Albuterol Other (See Comments)     faint    Ciprofloxacin Other (See Comments)     Central nervous system issues    Last reviewed on  2/8/2023 7:22 PM by Stacy Pereira      Tasks added this encounter   No tasks added.   Tasks due within next 3 months   No tasks due.     Stephanie Yost, PharmD  [facility]  [address] [place] [address]  Phone: [phone]  Fax: [phone]

## 2023-05-03 ENCOUNTER — PATIENT MESSAGE (OUTPATIENT)
Dept: INTERNAL MEDICINE | Facility: CLINIC | Age: 78
End: 2023-05-03
Payer: MEDICARE

## 2023-05-03 DIAGNOSIS — E06.3 HASHIMOTO'S THYROIDITIS: ICD-10-CM

## 2023-05-03 DIAGNOSIS — L65.9 HAIR LOSS: Primary | ICD-10-CM

## 2023-05-03 DIAGNOSIS — L85.3 DRY SKIN: ICD-10-CM

## 2023-05-03 DIAGNOSIS — E06.3 HASHIMOTO'S DISEASE: ICD-10-CM

## 2023-05-09 ENCOUNTER — HOSPITAL ENCOUNTER (OUTPATIENT)
Dept: RADIOLOGY | Facility: OTHER | Age: 78
Discharge: HOME | End: 2023-05-09
Attending: INTERNAL MEDICINE
Payer: MEDICARE

## 2023-05-09 DIAGNOSIS — R93.3 ABNORMAL FINDINGS ON DIAGNOSTIC IMAGING OF OTHER PARTS OF DIGESTIVE TRACT: ICD-10-CM

## 2023-05-09 DIAGNOSIS — R13.10 DYSPHAGIA: ICD-10-CM

## 2023-05-09 PROCEDURE — 74230 X-RAY XM SWLNG FUNCJ C+: CPT | Mod: TC

## 2023-05-09 PROCEDURE — 74230 FL MODIFIED BARIUM SWALLOW SPEECH STUDY: ICD-10-PCS | Mod: 26,,, | Performed by: RADIOLOGY

## 2023-05-09 PROCEDURE — A9698 NON-RAD CONTRAST MATERIALNOC: HCPCS | Performed by: INTERNAL MEDICINE

## 2023-05-09 PROCEDURE — 25500020 PHARM REV CODE 255: Performed by: INTERNAL MEDICINE

## 2023-05-09 PROCEDURE — 92611 MOTION FLUOROSCOPY/SWALLOW: CPT

## 2023-05-09 PROCEDURE — 74230 X-RAY XM SWLNG FUNCJ C+: CPT | Mod: 26,,, | Performed by: RADIOLOGY

## 2023-05-09 RX ADMIN — BARIUM SULFATE 30 ML: 0.81 POWDER, FOR SUSPENSION ORAL at 09:05

## 2023-05-09 NOTE — PROCEDURES
"Modified Barium Swallow    Patient Name:  Marly Maciel   MRN:  [id number]      Recommendations:     Recommendations:                General Recommendations:    ENT evaluation  Follow up with GI per plan  Can trial a few sessions of OP SLP tx to work on RMST, safe swallow strategies, to improve outcomes if aspiration does occur    Diet recommendations:  continue regular solids/thin liquids       Aspiration Precautions: small volumes of thin liquids ~10ml bolus volume), single sips vs consecutive sips, upright seating during and following intake    General Precautions: Standard,        Referral     Reason for Referral  Patient was referred for a Modified Barium Swallow Study to assess the efficiency of her swallow function, rule out aspiration and make recommendations regarding safe dietary consistencies, effective compensatory strategies, and safe eating environment.     Patient reports sensation of aspiration occurs at times especially with dry/crumbly food including baked cheetos. She reports sensation of these foods "going the wrong way". She has had a recent CT which indicated possible aspiration in report- prompting visit for [facility] this date. She reports having a barium esophagram done as well as the CT in [place].    CT indicates Moderate upper esophageal distention, unchanged.     Patient reports hx of a barium swallow/esophagram in which she recalls receiving a dx of dysmotility but no reported reflux. She does not have these results and they are not located in chart review. She reports following with GI here and has appointment for esophageal manometry upcoming.    Diagnosis: <principal problem not specified>       History:     78 yo F with MAC infection. S/p right lower lobectomy 11/2022.  Past Medical History:   Diagnosis Date    Hashimoto's thyroiditis     Hyperlipidemia     Hypertension     Internal hemorrhoids     Osteopenia        Objective:     Visualization  Patient was seen in the lateral " view    Oral Peripheral Examination   Normal oral motor function noted    Consistencies Assessed  Thin liquid varibar barium via 5cc and 10cc volume cupsips self-administered, unmeasured single and consecutive sips via cup  Puree 1 tsp varibar pudding barium  Solids 1 bites of dilia cracker coated in varibar pudding barium  1 barium tablet followed by thin liquid wash    Oral Preparation/Oral Phase  Adequate oral manipulation of bolus, functional acceptance without anterior loss  Adequate bolus mastication   No significant oral residues post intake    Pharyngeal Phase   Pharyngeal swallow was further evaluated utilizing the analysis of Swallowing Physiology: Events, Kinematics & Timing for Use in Clinical Practice (ASPEKT-C) method of evaluation. This method of evaluation further assesses the swallowing safety and efficiency utilizing measurements of timing and pharyngeal residues as well as pharyngeal area at maximum pharyngeal constriction (PhAMPC) as indicated.     THIN LIQUID  Swallow reaction time (defined as time from the bolus passing the ramus of the mandible until hyoid burst) was measured at 0-99.9 ms   This is indicative of a timely swallow reaction time (WNL = 212.12ms (±579.54) for thin liquids)   SRT occurred as bolus was passing ramus of mandible  Time to LVC per ASPEKT-C measurements = ranged from 299.7-466.2ms with an average of 377ms across thin liquid trials. WFL time to LVC for thin liquids =(<167ms)  This is indicative of delayed onset of laryngeal vestibule closure  There was grossly functional duration of LVC at an average of 366.3-399.6ms across trials (WNL =603.70 (±272.53))  Visible pharyngeal area at max constriction measured 0% of total pharyngeal space- (WFL = <2.7%) indicative of adequate base of tongue contraction/pharyngeal stripping wave with complete pharyngeal obliteration  No resultant residues in pharynx  During each swallow a small collection of contrast was noted superior to  the ramus of the mandible within the oropharynx, contrast collected in this region at the height of the swallow, and was then noted to migrate inferiorly to the pharynx after the swallow- 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   Given larger bolus volumes there was an increase in collection and subsequent pooled content to the pharynx after the swallow, this was cleared independently with a second swallow  Airway invasion noted in 83% of attempts, with PAS scores of 2, indicating laryngeal vestibule penetration was transient without residual contrast in the larynx  There was increased depth and amount of bolus penetrated given continuous cupsips of thin liquids  Airway invasion occurred during the swallow as bolus within the pharynx migrated into the laryngeal vestibule prior to full LVC        PUREED SOLIDS  Swallow reaction time (defined as time from the bolus passing the ramus of the mandible until hyoid burst) was measured at 199.8 ms   This is indicative of a timely swallow reaction time (WNL = 827.97ms (±1448.47) for pureed solids)   Time to LVC per ASPEKT-C measurements = 133.2ms. WFL time to LVC for pureed solids =(<167ms)  This is indicative of timely onset of laryngeal vestibule closure  Pharyngeal area at max constriction measured up to 0% of total pharyngeal space- (WFL = <1.5%) indicative of adequate base of tongue contraction/pharyngeal stripping wave with complete pharyngeal obliteration  Resultant residues measuring from 0.34% of pharynx (Normative amount of residues = <1.4%)  Airway invasion did not occur with pureed solids     CHEWABLE SOLIDS  Bolus aggregated to the level of the vallecular space prior to intiation of the swallow  Pharyngeal area at max constriction measured up to 4.06% of total pharyngeal space- while norms for chewable solids have not yet been established, this likely indicates decreased base of tongue contraction/pharyngeal stripping wave with incomplete pharyngeal obliteration  Resultant  residues measuring 4.62% of pharynx   Norms have not yet been established for chewable solids, however, significantly above threshold for normative when compared to pureed solids (Normative amount of residues for puree = <1.4% of pharynx)  A secondary independently initiated swallow cleared the pharynx of measurable residues.  Airway invasion did not occur with chewable solids    BARIUM TABLET  Barium tablet passed through the oropharynx without difficulty     Cervical Esophageal Phase  A prominent cricopharyngeus is intermittently noted  There were intermittent periods of trace residues within the UES, which mildly migrated anteriorly back to the pyriform sinuses after the swallow  In a few frames there was an anterior projection within the superior UES, recommend GI follow up to rule out web  Duration of UES relaxation functional (Norms per Kalani et al.,2018 = 429.53 (±87.44))  measured at 599.4-632.7ms for thin liquids, 466.2 for pureed solids       Assessment:     Impressions   Most significant finding was contrast entrapment superior to the vallecular space in the oropharynx, 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   While a/p views were not obtained the patient turned head to slightly oblique view during intake of pureed solids, at which point collections viewed to be bilateral  Collected material noted in this region at the height of the swallow, after the swallow material migrated inferior into the pharynx where it pooled prior to being cleared by an independently elicited secondary swallow  An increased collection of material was noted with increased volume of intake   Prolonged time to laryngeal vestibule closure (LVC) is noted with thin liquids, measuring at  377ms across thin liquid trials. WFL time to LVC for thin liquids =(<167ms)  Discoordination/delayed LVC resulted in frequent laryngeal vestibule penetration during the  swallow with thin liquids  Penetrated material did not reach the level of the vocal folds, immediately evacuated the laryngeal vestibule upon complete LVC  Studies do show laryngeal penetration with PAS scores of 2 frequently found in healthy aging adults  There was suspected adequate strength of the swallow, functional velar elevation, hyolaryngeal elevation/excursion,completed epiglottic retroflexion, adequate base of tongue retraction and pharyngeal stripping wave  Complete pharyngeal obliteration noted  No significant pharyngeal residues due to strength deficits  Functional duration of UES relaxation, though, cricopharyngeus appears to have reduced degree of relaxation, suspect mild hypertonicity. Instances of transient residue within UES which migrated back to pharynx after the swallow  No observed aspiration noted during this study.     Prognosis: Fair    Barriers:  Respiratory compromise    Plan  ENT assessment of possible oropharyngeal diverticuli    Education  Results were discussed with patient. Results were discussed with Medical Team who was in agreement with plan.       Plan:   ENT consult  Ongoing assessment by GI  Recommend SLP OP tx, can train in safe swallow strategies/aspiration precautions, trial of training in oral prep set to train prompt LVC closure while bolus held anteriorly in oral cavity, possible RMST to improve respiratory strength and laryngeal vestibule closure, can improve outcomes if airway threat occurs     Discharge recommendations:      Barriers to Discharge:  None    Time Tracking:   SLP Treatment Date:   05/09/23  Speech Start Time:  0915  Speech Stop Time:  1000     Speech Total Time (min):  45 min    05/09/2023  Standard norms for ultrathin and pudding barium above as studied by Kalani et al 2018.     Kalani CERVANTES, Anup LINDO, Lissett E, Richard AK, Mahsa F, Savage L, Melanie A, Enoc A, Lauern A. Swallowing Kinematic Differences Across Frozen, Mixed, and Ultrathin Liquid Boluses  in Healthy Adults: Age, Sex, and Normal Variability. J Speech Lang Hear Res. 2018 ;61(7):5475-4341. doi: 10.1044/1737_ZHUVH-F-. PMID: 39485108; PMCID: DUG5013272.    Measurements re: pharyngeal area at max constriction, pharyngeal stasis norms, and time to LVC norms as studiet by Jose Eduardo et al., 2019.    Jose Eduardo CM, Zoë ESTRADA, Joshua WERNER, Korin BT, Arti AM, Mariangel SCHULZV, Daniel S, Eliana MS, Erlinda TJ, Bashir AA, John TS. Reference Values for Healthy Swallowing Across the Range From Thin to Extremely Thick Liquids. J Speech Lang Hear Res. 2019 May 21;62(5):3350-3422. doi: 10.1044/2917_SHJRU-B-. PMID: 28455714; PMCID: [id number].

## 2023-05-10 ENCOUNTER — LAB VISIT (OUTPATIENT)
Dept: LAB | Facility: HOSPITAL | Age: 78
End: 2023-05-10
Attending: INTERNAL MEDICINE
Payer: MEDICARE

## 2023-05-10 DIAGNOSIS — L65.9 HAIR LOSS: ICD-10-CM

## 2023-05-10 DIAGNOSIS — E06.3 HASHIMOTO'S THYROIDITIS: ICD-10-CM

## 2023-05-10 DIAGNOSIS — L85.3 DRY SKIN: ICD-10-CM

## 2023-05-10 LAB
T4 FREE SERPL-MCNC: 0.98 NG/DL (ref 0.71–1.51)
THYROPEROXIDASE IGG SERPL-ACNC: <6 IU/ML
TSH SERPL DL<=0.005 MIU/L-ACNC: 2.19 UIU/ML (ref 0.4–4)

## 2023-05-10 PROCEDURE — 84439 ASSAY OF FREE THYROXINE: CPT | Performed by: INTERNAL MEDICINE

## 2023-05-10 PROCEDURE — 84443 ASSAY THYROID STIM HORMONE: CPT | Performed by: INTERNAL MEDICINE

## 2023-05-10 PROCEDURE — 36415 COLL VENOUS BLD VENIPUNCTURE: CPT | Mod: PN | Performed by: INTERNAL MEDICINE

## 2023-05-11 ENCOUNTER — SPECIALTY PHARMACY (OUTPATIENT)
Dept: PHARMACY | Facility: CLINIC | Age: 78
End: 2023-05-11
Payer: MEDICARE

## 2023-05-11 NOTE — TELEPHONE ENCOUNTER
Outgoing call: Patient has 9 days on hand, requested that [facility] give her a call back on Monday 5/15.[facility] will follow up.

## 2023-05-15 NOTE — TELEPHONE ENCOUNTER
Specialty Pharmacy - Refill Coordination    Specialty Medication Orders Linked to Encounter      Flowsheet Row Most Recent Value   Medication #1 omadacycline (NUZYRA) 150 mg Tab (Order#[id number], Rx#[id number])            Refill Questions - Documented Responses      Flowsheet Row Most Recent Value   Patient Availability and HIPAA Verification    Does patient want to proceed with activity? Yes   HIPAA/medical authority confirmed? Yes   Relationship to patient of person spoken to? Self   Refill Screening Questions    Changes to allergies? No   Changes to medications? No   New conditions since last clinic visit? No   Unplanned office visit, urgent care, ED, or hospital admission in the last 4 weeks? No   How does patient/caregiver feel medication is working? Good   Financial problems or insurance changes? No   How many doses of your specialty medications were missed in the last 4 weeks? 0   Would patient like to speak to a pharmacist? No   When does the patient need to receive the medication? 05/20/23   Refill Delivery Questions    How will the patient receive the medication? MEDRx   When does the patient need to receive the medication? 05/20/23   Shipping Address Home   Address in [facility] confirmed and updated if neccessary? No   Expected Copay ($) 398.14   Is the patient able to afford the medication copay? Yes   Payment Method CC on file   Days supply of Refill 15   Refill activity completed? Yes   Refill activity plan Refill scheduled   Shipment/Pickup Date: 05/18/23            Current Outpatient Medications   Medication Sig    amikacin 1,000 mg/4 mL Soln     amikacin liposomal/neb.accessr (ARIKAYCE INHL) Inhale into the lungs.    atorvastatin (LIPITOR) 10 MG tablet TAKE 1 TABLET BY MOUTH EVERY DAY    biotin 1 mg tablet Take 1,000 mcg by mouth 3 (three) times daily.    calcium phosphate trib/vit D3 (CALTRATE GUMMY BITES ORAL) Take 2 tablets by mouth once daily.    celecoxib (CELEBREX) 200 MG capsule Take  200 mg by mouth.    CLOFAZIMINE ORAL Take 50 mg by mouth 2 (two) times a day.    hydroCHLOROthiazide (HYDRODIURIL) 25 MG tablet Take 1 tablet (25 mg total) by mouth once daily.    metoprolol succinate (TOPROL-XL) 50 MG 24 hr tablet Take 1 tablet (50 mg total) by mouth once daily.    mv-min/iron/folic/calcium/vitK (WOMEN'S MULTIVITAMIN ORAL) Take 1 tablet by mouth once daily.    omadacycline (NUZYRA) 150 mg Tab Take 1 tablet (150 mg) by mouth twice daily after meals for chronic pulmonary infection.    tedizolid (SIVEXTRO) 200 mg Tab Take 1 tablet Every Monday, Wednesday, and Friday by mouth   Last reviewed on 2/8/2023  7:27 PM by Stacy Pereira MD    Review of patient's allergies indicates:   Allergen Reactions    Adhesive Rash     Tape & bandaids  Tape & bandaids    Albuterol Other (See Comments)     faint    Ciprofloxacin Other (See Comments)     Central nervous system issues    Last reviewed on  2/8/2023 7:22 PM by Stacy Pereira      Tasks added this encounter   No tasks added.   Tasks due within next 3 months   No tasks due.     Aggie Fulton [facility]  [address] [place] [id number]  Phone: [phone]  Fax: [phone]

## 2023-05-25 RX ORDER — TEDIZOLID PHOSPHATE 200 MG/1
TABLET, FILM COATED ORAL
Qty: 12 TABLET | Refills: 5 | OUTPATIENT
Start: 2023-05-25 | End: 2024-02-22

## 2023-05-26 ENCOUNTER — SPECIALTY PHARMACY (OUTPATIENT)
Dept: PHARMACY | Facility: CLINIC | Age: 78
End: 2023-05-26
Payer: MEDICARE

## 2023-05-26 RX ORDER — OMADACYCLINE 150 MG/1
TABLET, FILM COATED ORAL
Qty: 60 TABLET | Refills: 3 | Status: CANCELLED | OUTPATIENT
Start: 2023-05-26

## 2023-05-26 NOTE — TELEPHONE ENCOUNTER
Outgoing call to pt regarding nuzyra refill. Pt reports 9 days on hand. Will follow up once refills are received.

## 2023-06-02 RX ORDER — OMADACYCLINE 150 MG/1
TABLET, FILM COATED ORAL
Qty: 60 TABLET | Refills: 3 | Status: CANCELLED | OUTPATIENT
Start: 2023-05-26

## 2023-06-05 NOTE — TELEPHONE ENCOUNTER
Specialty Pharmacy - Refill Coordination    Specialty Medication Orders Linked to Encounter      Flowsheet Row Most Recent Value   Medication #1 omadacycline (NUZYRA) 150 mg Tab (Order#[id number], Rx#[id number])          Rx called in by provider. Refill set up for delivery tomorrow. Patient will take dose as soon as package is received and will resume normal dosing scheduled on Wed.     Refill Questions - Documented Responses      Flowsheet Row Most Recent Value   Patient Availability and HIPAA Verification    Does patient want to proceed with activity? Yes   HIPAA/medical authority confirmed? Yes   Relationship to patient of person spoken to? Self   Refill Screening Questions    Changes to allergies? No   Changes to medications? No   New conditions since last clinic visit? No   Unplanned office visit, urgent care, ED, or hospital admission in the last 4 weeks? No   How does patient/caregiver feel medication is working? Good   Financial problems or insurance changes? No   How many doses of your specialty medications were missed in the last 4 weeks? 0   When does the patient need to receive the medication? 06/06/23   Refill Delivery Questions    How will the patient receive the medication? MEDRx   When does the patient need to receive the medication? 06/06/23   Shipping Address Home   Address in [facility] confirmed and updated if neccessary? Yes   Expected Copay ($) 398.14   Is the patient able to afford the medication copay? Yes   Payment Method CC on file   Days supply of Refill 15   Supplies needed? No supplies needed   Refill activity completed? Yes   Refill activity plan Refill scheduled   Shipment/Pickup Date: 06/05/23            Current Outpatient Medications   Medication Sig    amikacin 1,000 mg/4 mL Soln     amikacin liposomal/neb.accessr (ARIKAYCE INHL) Inhale into the lungs.    atorvastatin (LIPITOR) 10 MG tablet TAKE 1 TABLET BY MOUTH EVERY DAY    biotin 1 mg tablet Take 1,000 mcg by mouth 3  (three) times daily.    calcium phosphate trib/vit D3 (CALTRATE GUMMY BITES ORAL) Take 2 tablets by mouth once daily.    celecoxib (CELEBREX) 200 MG capsule Take 200 mg by mouth.    CLOFAZIMINE ORAL Take 50 mg by mouth 2 (two) times a day.    hydroCHLOROthiazide (HYDRODIURIL) 25 MG tablet Take 1 tablet (25 mg total) by mouth once daily.    metoprolol succinate (TOPROL-XL) 50 MG 24 hr tablet Take 1 tablet (50 mg total) by mouth once daily.    mv-min/iron/folic/calcium/vitK (WOMEN'S MULTIVITAMIN ORAL) Take 1 tablet by mouth once daily.    omadacycline 150 mg Tab Take 2 tablets (300 mg) by mouth once daily on an empty stomach for chronic pulmonary infection.    tedizolid (SIVEXTRO) 200 mg Tab Take 1 tablet Every Monday, Wednesday, and Friday by mouth   Last reviewed on 2/8/2023  7:27 PM by Stacy Pereira MD    Review of patient's allergies indicates:   Allergen Reactions    Adhesive Rash     Tape & bandaids  Tape & bandaids    Albuterol Other (See Comments)     faint    Ciprofloxacin Other (See Comments)     Central nervous system issues    Last reviewed on  2/8/2023 7:22 PM by Stacy Pereira      Tasks added this encounter   No tasks added.   Tasks due within next 3 months   No tasks due.     Leatha Gonzalez, PharmD  [facility]  [address] [place] [id number]  Phone: [phone]  Fax: [phone]

## 2023-06-05 NOTE — TELEPHONE ENCOUNTER
Returned patient's VM for Nuzyra (Omadacycline) refill. She took last dose this morning. Urgent message sent to MDO and staff. Pt also states she sent a text messaged to provider

## 2023-06-07 ENCOUNTER — OFFICE VISIT (OUTPATIENT)
Dept: INTERNAL MEDICINE | Facility: CLINIC | Age: 78
End: 2023-06-07
Attending: INTERNAL MEDICINE
Payer: MEDICARE

## 2023-06-07 VITALS
BODY MASS INDEX: 21.74 KG/M2 | DIASTOLIC BLOOD PRESSURE: 86 MMHG | SYSTOLIC BLOOD PRESSURE: 128 MMHG | HEIGHT: 65 IN | HEART RATE: 77 BPM | WEIGHT: 130.5 LBS | OXYGEN SATURATION: 98 %

## 2023-06-07 DIAGNOSIS — E78.5 HYPERLIPIDEMIA, UNSPECIFIED HYPERLIPIDEMIA TYPE: ICD-10-CM

## 2023-06-07 DIAGNOSIS — A31.0 MYCOBACTERIAL DISEASE, PULMONARY: ICD-10-CM

## 2023-06-07 DIAGNOSIS — I10 HYPERTENSION, UNSPECIFIED TYPE: Primary | ICD-10-CM

## 2023-06-07 DIAGNOSIS — E06.3 HASHIMOTO'S THYROIDITIS: ICD-10-CM

## 2023-06-07 DIAGNOSIS — M85.80 OSTEOPENIA, UNSPECIFIED LOCATION: ICD-10-CM

## 2023-06-07 DIAGNOSIS — E55.9 VITAMIN D DEFICIENCY DISEASE: ICD-10-CM

## 2023-06-07 DIAGNOSIS — Z90.2 STATUS POST PARTIAL LOBECTOMY OF LUNG: ICD-10-CM

## 2023-06-07 DIAGNOSIS — Z12.31 ENCOUNTER FOR SCREENING MAMMOGRAM FOR MALIGNANT NEOPLASM OF BREAST: ICD-10-CM

## 2023-06-07 PROCEDURE — 99999 PR PBB SHADOW E&M-EST. PATIENT-LVL IV: CPT | Mod: PBBFAC,,, | Performed by: INTERNAL MEDICINE

## 2023-06-07 PROCEDURE — 99215 PR OFFICE/OUTPT VISIT, EST, LEVL V, 40-54 MIN: ICD-10-PCS | Mod: S$PBB,,, | Performed by: INTERNAL MEDICINE

## 2023-06-07 PROCEDURE — 99215 OFFICE O/P EST HI 40 MIN: CPT | Mod: S$PBB,,, | Performed by: INTERNAL MEDICINE

## 2023-06-07 PROCEDURE — 99214 OFFICE O/P EST MOD 30 MIN: CPT | Mod: PBBFAC | Performed by: INTERNAL MEDICINE

## 2023-06-07 PROCEDURE — 99999 PR PBB SHADOW E&M-EST. PATIENT-LVL IV: ICD-10-PCS | Mod: PBBFAC,,, | Performed by: INTERNAL MEDICINE

## 2023-06-07 RX ORDER — TIOTROPIUM BROMIDE INHALATION SPRAY 3.12 UG/1
SPRAY, METERED RESPIRATORY (INHALATION)
COMMUNITY
Start: 2023-04-22 | End: 2024-02-22

## 2023-06-07 NOTE — PROGRESS NOTES
"Subjective:   Patient ID: Marly Maciel is a 77 y.o. female  Chief complaint:   Chief Complaint   Patient presents with    Hyperlipidemia     F/u       HPI  Here for 3 month follow up of htn:     Still walking >1mi on most days     Did see GI - Dr. Hamilton   Had mbss:   No tracheal aspiration witnessed with any consistency.  Prominent cricopharyngeus.  - see speech pathology note for full details.  - to have EGD at end June     Hx of pulm mac, s/p lobectomy:   - [facility] [place] 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 Rosales is retiring and now to be folloewd by Dr. Gerardo Mari   "Treatment through [facility].  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 Being seen at [facility] q 3mo. On omadacylcine plus tidezolid daily.  Now on arikayce three times per week because of courhg. This improved with three times per week regimen.  Seen by Son 2 weeks ago. Told sputum culture neg since 1/23. CT scan 2 weeks ago showed possible hydropneumothorax. Not clear that her pulmonologist agreed with this. Has no pumonary symptoms. Cough is better. Brings up 1 tabsp daily. No blood. No العراقي. Notes sob with bending over.   No fever, chills, nite sweats. No wt loss    Had recent esoph mannometry study wich she needs to review with GI. No DAVID symptoms"    HTN:  Taking Bb and hctz 3 times per week  Home bp at goal   -  Not needed ekg as not on clofazamine at this time - if resume will let me know and will reorder ekg at that " "time to check q 1-3 months moving forward    Mole:   Had bx and benign per pt;  Prev:   Had flat mole left suprapubic region   No drainage   Not itchy   Just noticed mole - fairly new   Derm: Wahl     Atherosclerosis, HLD:   sunshine statin     Osteopenia:   - taking caltrate, biotin, and MVI   - Dexa 7/2022     Aortic ectasia - ascending aorta - seen on CT report 7/2019 from [place] and reviewed last few CT scans - none seen on follow CT scan 2022 and this year    - bp controlled   - sunshine statin      Macular degeneration:   - followed by Dr. Nascimento   - on eye supplement      Cervical DJD:   - stable   Previously:   Attended PT   Followed by ortho - seen by ortho after PT at [facility]  Had CT scan of neck and went to receive injection - not improved and managing at this time    Hypothyroidism, hx of hashimotos and hyperparathyroidism dx 2/2 hyperCa diagnosed and treated with surgery 4/8/2014 at [facility] and s/p parathyroidectomy and partial thyroidectomy:  - tsh wnl   - not requiring levothyroxine at this time     HM:  Shingrix   mmg  Can get bival covid booster     Review of Systems    Objective:  Vitals:    06/07/23 0854   BP: 128/86   BP Location: Left arm   Patient Position: Sitting   Pulse: 77   SpO2: 98%   Weight: 59.2 kg (130 lb 8.2 oz)   Height: 5' 5" (1.651 m)     Body mass index is 21.72 kg/m².    Physical Exam  Vitals reviewed.   Constitutional:       Appearance: Normal appearance. She is well-developed.   HENT:      Head: Normocephalic and atraumatic.      Right Ear: Tympanic membrane, ear canal and external ear normal.      Left Ear: Tympanic membrane, ear canal and external ear normal.      Nose: Nose normal. No congestion.      Mouth/Throat:      Mouth: Mucous membranes are moist.      Pharynx: Oropharynx is clear. No oropharyngeal exudate.   Eyes:      Extraocular Movements: Extraocular movements intact.      Conjunctiva/sclera: Conjunctivae normal.   Neck:      Thyroid: No thyromegaly. "   Cardiovascular:      Rate and Rhythm: Normal rate and regular rhythm.      Pulses: Normal pulses.      Heart sounds: Normal heart sounds.   Pulmonary:      Effort: Pulmonary effort is normal. No respiratory distress.      Breath sounds: Normal breath sounds. No stridor. No wheezing or rales.      Comments: Talking in complete sentences  Abdominal:      General: Bowel sounds are normal.      Palpations: Abdomen is soft.   Musculoskeletal:         General: No swelling or tenderness.      Cervical back: Neck supple.   Lymphadenopathy:      Cervical: No cervical adenopathy.   Skin:     General: Skin is warm and dry.      Capillary Refill: Capillary refill takes less than 2 seconds.   Neurological:      General: No focal deficit present.      Mental Status: She is alert and oriented to person, place, and time. Mental status is at baseline.   Psychiatric:         Behavior: Behavior normal.         Thought Content: Thought content normal.       Assessment:  1. Hypertension, unspecified type    2. Encounter for screening mammogram for malignant neoplasm of breast    3. Hyperlipidemia, unspecified hyperlipidemia type    4. Hashimoto's thyroiditis    5. Status post partial lobectomy of lung    6. Vitamin D deficiency disease    7. Mycobacterial disease, pulmonary    8. Osteopenia, unspecified location            Plan:  Malry was seen today for hyperlipidemia.    Diagnoses and all orders for this visit:    Hypertension, unspecified type  Stable   Cont med     Encounter for screening mammogram for malignant neoplasm of breast  -     Mammo Digital Screening Bilat w/ Wicho; Future  -     Mammo Digital Screening Bilat w/ Wicho    Hyperlipidemia, unspecified hyperlipidemia type  Stabl e  Cont statin     Hashimoto's thyroiditis  Stable   Euthyroid   Monitor tsh     Status post partial lobectomy of lung  Cont f/uw ith specialists   Cont med per pulm     Vitamin D deficiency disease  Stable   Cont suppl     Mycobacterial disease,  pulmonary    Osteopenia, unspecified location  Stabl e  Cont suppl and exercise     50 min spent in care of patient including chart review, history, orders, physical, orders and coordination of care    Health Maintenance   Topic Date Due    Mammogram  06/02/2023    High Dose Statin  06/07/2024    DEXA Scan  07/06/2025    TETANUS VACCINE  06/14/2026    Lipid Panel  07/05/2027    Hepatitis C Screening  Completed

## 2023-06-09 ENCOUNTER — PATIENT MESSAGE (OUTPATIENT)
Dept: INTERNAL MEDICINE | Facility: CLINIC | Age: 78
End: 2023-06-09
Payer: MEDICARE

## 2023-06-19 ENCOUNTER — SPECIALTY PHARMACY (OUTPATIENT)
Dept: PHARMACY | Facility: CLINIC | Age: 78
End: 2023-06-19
Payer: MEDICARE

## 2023-06-19 NOTE — TELEPHONE ENCOUNTER
Specialty Pharmacy - Refill Coordination    Specialty Medication Orders Linked to Encounter      Flowsheet Row Most Recent Value   Medication #1 omadacycline 150 mg Tab (Order#[id number], Rx#[id number])            Refill Questions - Documented Responses      Flowsheet Row Most Recent Value   Patient Availability and HIPAA Verification    Does patient want to proceed with activity? Yes   HIPAA/medical authority confirmed? Yes   Relationship to patient of person spoken to? Self   Refill Screening Questions    Changes to allergies? No   Changes to medications? No   New conditions since last clinic visit? No   Unplanned office visit, urgent care, ED, or hospital admission in the last 4 weeks? No   How does patient/caregiver feel medication is working? Good   Financial problems or insurance changes? No   How many doses of your specialty medications were missed in the last 4 weeks? 0   Would patient like to speak to a pharmacist? No   When does the patient need to receive the medication? 06/20/23   Refill Delivery Questions    How will the patient receive the medication? MEDRx   When does the patient need to receive the medication? 06/20/23   Shipping Address Home   Address in [facility] confirmed and updated if neccessary? Yes   Expected Copay ($) 398.14   Is the patient able to afford the medication copay? Yes   Payment Method CC on file   Days supply of Refill 15   Supplies needed? No supplies needed   Refill activity completed? Yes   Refill activity plan Refill scheduled   Shipment/Pickup Date: 06/19/23            Current Outpatient Medications   Medication Sig    amikacin liposomal/neb.accessr (ARIKAYCE INHL) Inhale into the lungs.    atorvastatin (LIPITOR) 10 MG tablet TAKE 1 TABLET BY MOUTH EVERY DAY    biotin 1 mg tablet Take 1,000 mcg by mouth 3 (three) times daily.    calcium phosphate trib/vit D3 (CALTRATE GUMMY BITES ORAL) Take 2 tablets by mouth once daily.    celecoxib (CELEBREX) 200 MG capsule Take  200 mg by mouth.    hydroCHLOROthiazide (HYDRODIURIL) 25 MG tablet Take 1 tablet (25 mg total) by mouth once daily.    metoprolol succinate (TOPROL-XL) 50 MG 24 hr tablet Take 1 tablet (50 mg total) by mouth once daily.    mv-min/iron/folic/calcium/vitK (WOMEN'S MULTIVITAMIN ORAL) Take 1 tablet by mouth once daily.    omadacycline 150 mg Tab Take 2 tablets (300 mg) by mouth once daily on an empty stomach for chronic pulmonary infection.    SPIRIVA RESPIMAT 2.5 mcg/actuation inhaler     tedizolid (SIVEXTRO) 200 mg Tab Take 1 tablet Every Monday, Wednesday, and Friday by mouth   Last reviewed on 6/7/2023  9:41 AM by Stacy Pereira MD    Review of patient's allergies indicates:   Allergen Reactions    Adhesive Rash     Tape & bandaids  Tape & bandaids    Albuterol Other (See Comments)     faint    Ciprofloxacin Other (See Comments)     Central nervous system issues    Last reviewed on  6/7/2023 9:40 AM by Stacy Pereira      Tasks added this encounter   No tasks added.   Tasks due within next 3 months   6/17/2023 - Refill Coordination Outreach (1 time occurrence)     [facility]  [address] [place] [id number]  Phone: [phone]  Fax: [phone]

## 2023-06-22 NOTE — OR NURSING
Instructed patient to report to [facility] on 6/28 at 530. Reviewed current medications and allergies. No questions regarding preparations prior to EGD. Ride Confirmed.    Yes

## 2023-06-27 ENCOUNTER — PATIENT MESSAGE (OUTPATIENT)
Dept: PHARMACY | Facility: CLINIC | Age: 78
End: 2023-06-27
Payer: MEDICARE

## 2023-06-29 ENCOUNTER — HOSPITAL ENCOUNTER (OUTPATIENT)
Facility: OTHER | Age: 78
Discharge: HOME | End: 2023-06-29
Attending: INTERNAL MEDICINE | Admitting: INTERNAL MEDICINE
Payer: MEDICARE

## 2023-06-29 ENCOUNTER — ANESTHESIA EVENT (OUTPATIENT)
Dept: ENDOSCOPY | Facility: OTHER | Age: 78
End: 2023-06-29
Payer: MEDICARE

## 2023-06-29 ENCOUNTER — ANESTHESIA (OUTPATIENT)
Dept: ENDOSCOPY | Facility: OTHER | Age: 78
End: 2023-06-29
Payer: MEDICARE

## 2023-06-29 VITALS
HEART RATE: 68 BPM | RESPIRATION RATE: 17 BRPM | OXYGEN SATURATION: 100 % | DIASTOLIC BLOOD PRESSURE: 59 MMHG | SYSTOLIC BLOOD PRESSURE: 118 MMHG | TEMPERATURE: 98 F

## 2023-06-29 DIAGNOSIS — Z13.9 SCREENING DUE: ICD-10-CM

## 2023-06-29 PROCEDURE — 37000009 HC ANESTHESIA EA ADD 15 MINS: Performed by: INTERNAL MEDICINE

## 2023-06-29 PROCEDURE — 63600175 PHARM REV CODE 636 W HCPCS: Performed by: NURSE ANESTHETIST, CERTIFIED REGISTERED

## 2023-06-29 PROCEDURE — D9220A PRA ANESTHESIA: Mod: ,,, | Performed by: NURSE ANESTHETIST, CERTIFIED REGISTERED

## 2023-06-29 PROCEDURE — 37000008 HC ANESTHESIA 1ST 15 MINUTES: Performed by: INTERNAL MEDICINE

## 2023-06-29 PROCEDURE — D9220A PRA ANESTHESIA: ICD-10-PCS | Mod: ,,, | Performed by: NURSE ANESTHETIST, CERTIFIED REGISTERED

## 2023-06-29 PROCEDURE — 88305 TISSUE EXAM BY PATHOLOGIST: ICD-10-PCS | Mod: 26,,, | Performed by: PATHOLOGY

## 2023-06-29 PROCEDURE — 88305 TISSUE EXAM BY PATHOLOGIST: CPT | Mod: 26,,, | Performed by: PATHOLOGY

## 2023-06-29 PROCEDURE — 25000003 PHARM REV CODE 250: Performed by: NURSE ANESTHETIST, CERTIFIED REGISTERED

## 2023-06-29 PROCEDURE — C1726 CATH, BAL DIL, NON-VASCULAR: HCPCS | Performed by: INTERNAL MEDICINE

## 2023-06-29 PROCEDURE — 43248 EGD GUIDE WIRE INSERTION: CPT | Performed by: INTERNAL MEDICINE

## 2023-06-29 PROCEDURE — 43239 EGD BIOPSY SINGLE/MULTIPLE: CPT | Mod: 59 | Performed by: INTERNAL MEDICINE

## 2023-06-29 PROCEDURE — 63600175 PHARM REV CODE 636 W HCPCS: Performed by: ANESTHESIOLOGY

## 2023-06-29 PROCEDURE — 88305 TISSUE EXAM BY PATHOLOGIST: CPT | Performed by: PATHOLOGY

## 2023-06-29 RX ORDER — HYDROMORPHONE HYDROCHLORIDE 2 MG/ML
0.4 INJECTION, SOLUTION INTRAMUSCULAR; INTRAVENOUS; SUBCUTANEOUS EVERY 5 MIN PRN
Status: DISCONTINUED | OUTPATIENT
Start: 2023-06-29 | End: 2023-06-29 | Stop reason: HOSPADM

## 2023-06-29 RX ORDER — OXYCODONE HYDROCHLORIDE 5 MG/1
5 TABLET ORAL
Status: DISCONTINUED | OUTPATIENT
Start: 2023-06-29 | End: 2023-06-29 | Stop reason: HOSPADM

## 2023-06-29 RX ORDER — LIDOCAINE HYDROCHLORIDE 20 MG/ML
INJECTION INTRAVENOUS
Status: DISCONTINUED | OUTPATIENT
Start: 2023-06-29 | End: 2023-06-29

## 2023-06-29 RX ORDER — ONDANSETRON 2 MG/ML
4 INJECTION INTRAMUSCULAR; INTRAVENOUS DAILY PRN
Status: DISCONTINUED | OUTPATIENT
Start: 2023-06-29 | End: 2023-06-29 | Stop reason: HOSPADM

## 2023-06-29 RX ORDER — SODIUM CHLORIDE 0.9 % (FLUSH) 0.9 %
10 SYRINGE (ML) INJECTION
Status: DISCONTINUED | OUTPATIENT
Start: 2023-06-29 | End: 2023-06-29 | Stop reason: HOSPADM

## 2023-06-29 RX ORDER — PHENYLEPHRINE HYDROCHLORIDE 10 MG/ML
INJECTION INTRAVENOUS
Status: DISCONTINUED | OUTPATIENT
Start: 2023-06-29 | End: 2023-06-29

## 2023-06-29 RX ORDER — PROPOFOL 10 MG/ML
VIAL (ML) INTRAVENOUS
Status: DISCONTINUED | OUTPATIENT
Start: 2023-06-29 | End: 2023-06-29

## 2023-06-29 RX ORDER — MEPERIDINE HYDROCHLORIDE 25 MG/ML
12.5 INJECTION INTRAMUSCULAR; INTRAVENOUS; SUBCUTANEOUS ONCE AS NEEDED
Status: DISCONTINUED | OUTPATIENT
Start: 2023-06-29 | End: 2023-06-29 | Stop reason: HOSPADM

## 2023-06-29 RX ORDER — SODIUM CHLORIDE 0.9 % (FLUSH) 0.9 %
3 SYRINGE (ML) INJECTION
Status: DISCONTINUED | OUTPATIENT
Start: 2023-06-29 | End: 2023-06-29 | Stop reason: HOSPADM

## 2023-06-29 RX ADMIN — PROPOFOL 50 MG: 10 INJECTION, EMULSION INTRAVENOUS at 07:06

## 2023-06-29 RX ADMIN — PROPOFOL 70 MG: 10 INJECTION, EMULSION INTRAVENOUS at 07:06

## 2023-06-29 RX ADMIN — PHENYLEPHRINE HYDROCHLORIDE 200 MCG: 10 INJECTION INTRAVENOUS at 07:06

## 2023-06-29 RX ADMIN — SODIUM CHLORIDE, SODIUM LACTATE, POTASSIUM CHLORIDE, AND CALCIUM CHLORIDE: .6; .31; .03; .02 INJECTION, SOLUTION INTRAVENOUS at 06:06

## 2023-06-29 RX ADMIN — PHENYLEPHRINE HYDROCHLORIDE 100 MCG: 10 INJECTION INTRAVENOUS at 07:06

## 2023-06-29 RX ADMIN — LIDOCAINE HYDROCHLORIDE 50 MG: 20 INJECTION, SOLUTION INTRAVENOUS at 07:06

## 2023-06-29 NOTE — ANESTHESIA POSTPROCEDURE EVALUATION
Anesthesia Post Evaluation    Patient: Marly Maciel    Procedure(s) Performed: Procedure(s) (LRB):  EGD (ESOPHAGOGASTRODUODENOSCOPY) (N/A)    Final Anesthesia Type: general      Patient location during evaluation: [facility]  Patient participation: Yes- Able to Participate  Level of consciousness: awake and alert  Post-procedure vital signs: reviewed and stable (B/P-103/55, HR-58, RR-12, O2%-100)  Pain management: adequate  Airway patency: patent    PONV status at discharge: No PONV  Anesthetic complications: no      Cardiovascular status: hemodynamically stable  Respiratory status: unassisted, spontaneous ventilation and room air  Hydration status: euvolemic  Follow-up not needed.          Vitals Value Taken Time   /88 06/29/23 0624   Temp 36.6 °C (97.9 °F) 06/29/23 0624   Pulse 67 06/29/23 0624   Resp 20 06/29/23 0624   SpO2 97 % 06/29/23 0624         No case tracking events are documented in the log.      Pain/Alea Score: No data recorded

## 2023-06-29 NOTE — ANESTHESIA PREPROCEDURE EVALUATION
06/29/2023  Marly Maciel is a 77 y.o., female.      Pre-op Assessment    I have reviewed the Patient Summary Reports.     I have reviewed the Nursing Notes. I have reviewed the NPO Status.   I have reviewed the Medications.     Review of Systems  Anesthesia Hx:  Denies Family Hx of Anesthesia complications.   Denies Personal Hx of Anesthesia complications.   Social:  Non-Smoker    Hematology/Oncology:  Hematology Normal   Oncology Normal     Cardiovascular:   Hypertension hyperlipidemia    Pulmonary:   Pneumonia Bronchiectasis  MAC  S/P RLLectome   Hepatic/GI:   GERD    Neurological:  Neurology Normal    Endocrine:  Endocrine Normal Hashimoto's thyroiditis  TSH, Free T4 normal       Physical Exam  General: Well nourished, Alert, Cooperative and Oriented    Airway:  Mallampati: II   Mouth Opening: Normal  TM Distance: Normal  Tongue: Normal  Neck ROM: Normal ROM    Dental:  Intact        Anesthesia Plan  Type of Anesthesia, risks & benefits discussed:    Anesthesia Type: Gen Natural Airway  Intra-op Monitoring Plan: Standard ASA Monitors  Post Op Pain Control Plan: multimodal analgesia  Induction:  IV  Informed Consent: Informed consent signed with the Patient and all parties understand the risks and agree with anesthesia plan.  All questions answered.   ASA Score: 3    Ready For Surgery From Anesthesia Perspective.     .

## 2023-06-29 NOTE — H&P
HPI- Pt has a complicated h/o MAC with ? aspiration as well as questionable GEJ outlet obstruction on esophageal manometry.    Patient Vitals for the past 24 hrs:   BP Temp Temp src Pulse Resp SpO2   06/29/23 0624 126/88 97.9 °F (36.6 °C) Oral 67 20 97 %       Physical Exam:  Gen - Well developed, well nourished, no apparent distress  HEENT - Anicteric  CV - S1, S2, no murmurs/rubs  Lungs - CTA-B, normal excursion  Abd - Soft, NT, ND, normal BS's, no HSM.  Ext - No c/c/e  Neuro - No asterixis    Labs:  No results for input(s): WBC, RBC, HGB, HCT, PLT, MCV, MCH, MCHC in the last 24 hours.  No results for input(s): GLUCOSE, CALCIUM, PROT, NA, K, CO2, CL, BUN, CREATININE, ALKPHOS, ALT, AST, BILITOT in the last 24 hours.    Invalid input(s):  ALBUMIN  No results for input(s): PT, INR, APTT in the last 24 hours.        Assessment:  This patient is a 77 y.o. female with possible GEJ outlet obstruction    Recommendations:  EGD

## 2023-06-29 NOTE — PROVATION PATIENT INSTRUCTIONS
Discharge Summary/Instructions after an Endoscopic Procedure  Patient Name: Marly Maciel  Patient MRN: [id number]  Patient YOB: 1945 Thursday, June 29, 2023  Buffy Martin MD  RESTRICTIONS:  During your procedure today, you received medications for sedation.  These   medications may affect your judgment, balance and coordination.  Therefore,   for 24 hours, you have the following restrictions:   - DO NOT drive a car, operate machinery, make legal/financial decisions,   sign important papers or drink alcohol.    ACTIVITY:  Today: no heavy lifting, straining or running due to procedural   sedation/anesthesia.  The following day: return to full activity including work.  DIET:  Eat and drink normally unless instructed otherwise.     TREATMENT FOR COMMON SIDE EFFECTS:  - Mild abdominal pain, nausea, belching, bloating or excessive gas:  rest,   eat lightly and use a heating pad.  - Sore Throat: treat with throat lozenges and/or gargle with warm salt   water.  - Because air was used during the procedure, expelling large amounts of air   from your rectum or belching is normal.  - If a bowel prep was taken, you may not have a bowel movement for 1-3 days.    This is normal.  SYMPTOMS TO WATCH FOR AND REPORT TO YOUR PHYSICIAN:  1. Abdominal pain or bloating, other than gas cramps.  2. Chest pain.  3. Back pain.  4. Signs of infection such as: chills or fever occurring within 24 hours   after the procedure.  5. Rectal bleeding, which would show as bright red, maroon, or black stools.   (A tablespoon of blood from the rectum is not serious, especially if   hemorrhoids are present.)  6. Vomiting.  7. Weakness or dizziness.  GO DIRECTLY TO THE NEAREST EMERGENCY ROOM IF YOU HAVE ANY OF THE FOLLOWING:      Difficulty breathing              Chills and/or fever over 101 F   Persistent vomiting and/or vomiting blood   Severe abdominal pain   Severe chest pain   Black, tarry stools   Bleeding- more than one  tablespoon   Any other symptom or condition that you feel may need urgent attention  Your doctor recommends these additional instructions:  If any biopsies were taken, your doctors clinic will contact you in 1 to 2   weeks with any results.  - Discharge patient to home.   - Resume previous diet.   - Continue present medications.   - Await pathology results.   - Return to my office in 2 weeks.  For questions, problems or results please call your physician - Buffy Martin MD at Work:  [phone].  [facility], EMERGENCY ROOM PHONE NUMBER: [phone], [facility]   [phone].  IF A COMPLICATION OR EMERGENCY SITUATION ARISES AND YOU ARE UNABLE TO REACH   YOUR PHYSICIAN - GO DIRECTLY TO THE EMERGENCY ROOM.  MD Buffy Galarza MD  6/29/2023 7:42:17 AM  This report has been verified and signed electronically.  Dear patient,  As a result of recent federal legislation (The Federal Cures Act), you may   receive lab or pathology results from your procedure in your [facility]   account before your physician is able to contact you. Your physician or   their representative will relay the results to you with their   recommendations at their soonest availability.  Thank you,  [employer]ATION

## 2023-06-30 ENCOUNTER — SPECIALTY PHARMACY (OUTPATIENT)
Dept: PHARMACY | Facility: CLINIC | Age: 78
End: 2023-06-30
Payer: MEDICARE

## 2023-07-03 LAB
FINAL PATHOLOGIC DIAGNOSIS: NORMAL
GROSS: NORMAL
Lab: NORMAL

## 2023-07-03 NOTE — TELEPHONE ENCOUNTER
Specialty Pharmacy - Refill Coordination    Specialty Medication Orders Linked to Encounter      Flowsheet Row Most Recent Value   Medication #1 omadacycline 150 mg Tab (Order#[id number], Rx#[id number])            Refill Questions - Documented Responses      Flowsheet Row Most Recent Value   Patient Availability and HIPAA Verification    Does patient want to proceed with activity? Yes   HIPAA/medical authority confirmed? Yes   Relationship to patient of person spoken to? Self   Refill Screening Questions    Changes to allergies? No   Changes to medications? No   New conditions since last clinic visit? No   Unplanned office visit, urgent care, ED, or hospital admission in the last 4 weeks? No   How does patient/caregiver feel medication is working? Good   Financial problems or insurance changes? No   How many doses of your specialty medications were missed in the last 4 weeks? 0   Would patient like to speak to a pharmacist? No   When does the patient need to receive the medication? 07/05/23   Refill Delivery Questions    How will the patient receive the medication? MEDRx   When does the patient need to receive the medication? 07/05/23   Shipping Address Home   Address in [facility] confirmed and updated if neccessary? Yes   Expected Copay ($) 410.08   Is the patient able to afford the medication copay? Yes   Payment Method CC on file   Days supply of Refill 15   Refill activity completed? Yes   Refill activity plan Refill scheduled   Shipment/Pickup Date: 07/03/23            Current Outpatient Medications   Medication Sig    amikacin liposomal/neb.accessr (ARIKAYCE INHL) Inhale into the lungs.    atorvastatin (LIPITOR) 10 MG tablet TAKE 1 TABLET BY MOUTH EVERY DAY    biotin 1 mg tablet Take 1,000 mcg by mouth 3 (three) times daily.    calcium phosphate trib/vit D3 (CALTRATE GUMMY BITES ORAL) Take 2 tablets by mouth once daily.    celecoxib (CELEBREX) 200 MG capsule Take 200 mg by mouth.    hydroCHLOROthiazide  (HYDRODIURIL) 25 MG tablet Take 1 tablet (25 mg total) by mouth once daily.    metoprolol succinate (TOPROL-XL) 50 MG 24 hr tablet Take 1 tablet (50 mg total) by mouth once daily.    mv-min/iron/folic/calcium/vitK (WOMEN'S MULTIVITAMIN ORAL) Take 1 tablet by mouth once daily.    omadacycline 150 mg Tab Take 2 tablets (300 mg) by mouth once daily on an empty stomach for chronic pulmonary infection.    SPIRIVA RESPIMAT 2.5 mcg/actuation inhaler     tedizolid (SIVEXTRO) 200 mg Tab Take 1 tablet Every Monday, Wednesday, and Friday by mouth   Last reviewed on 6/29/2023  6:46 AM by Mariam Nunes CRNA    Review of patient's allergies indicates:   Allergen Reactions    Adhesive Rash     Tape & bandaids  Tape & bandaids    Albuterol Other (See Comments)     faint    Ciprofloxacin Other (See Comments)     Central nervous system issues    Last reviewed on  6/29/2023 7:46 AM by Terri Curran      Tasks added this encounter   No tasks added.   Tasks due within next 3 months   No tasks due.     Aggie Fulton [facility] - Specialty Pharmacy  [address] [place] [id number]  Phone: [phone]  Fax: [phone]

## 2023-07-11 ENCOUNTER — SPECIALTY PHARMACY (OUTPATIENT)
Dept: PHARMACY | Facility: CLINIC | Age: 78
End: 2023-07-11
Payer: MEDICARE

## 2023-07-13 ENCOUNTER — PATIENT MESSAGE (OUTPATIENT)
Dept: INTERNAL MEDICINE | Facility: CLINIC | Age: 78
End: 2023-07-13
Payer: MEDICARE

## 2023-07-13 DIAGNOSIS — H93.19 TINNITUS, UNSPECIFIED LATERALITY: ICD-10-CM

## 2023-07-13 DIAGNOSIS — H91.90 HEARING LOSS, UNSPECIFIED HEARING LOSS TYPE, UNSPECIFIED LATERALITY: Primary | ICD-10-CM

## 2023-07-18 NOTE — TELEPHONE ENCOUNTER
Specialty Pharmacy - Refill Coordination    Specialty Medication Orders Linked to Encounter      Flowsheet Row Most Recent Value   Medication #1 omadacycline 150 mg Tab (Order#[id number], Rx#[id number])            Refill Questions - Documented Responses      Flowsheet Row Most Recent Value   Patient Availability and HIPAA Verification    Does patient want to proceed with activity? Yes   HIPAA/medical authority confirmed? Yes   Relationship to patient of person spoken to? Self   Refill Screening Questions    Changes to allergies? No   Changes to medications? No   New conditions since last clinic visit? No   Unplanned office visit, urgent care, ED, or hospital admission in the last 4 weeks? No   How does patient/caregiver feel medication is working? Good   Financial problems or insurance changes? No   How many doses of your specialty medications were missed in the last 4 weeks? 0   Would patient like to speak to a pharmacist? No   When does the patient need to receive the medication? 07/22/23   Refill Delivery Questions    How will the patient receive the medication? MEDRx   When does the patient need to receive the medication? 07/22/23   Shipping Address Home   Address in [facility] confirmed and updated if neccessary? Yes   Expected Copay ($) 410.08   Is the patient able to afford the medication copay? Yes   Payment Method CC on file   Days supply of Refill 15   Supplies needed? No supplies needed   Refill activity completed? Yes   Refill activity plan Refill scheduled   Shipment/Pickup Date: 07/19/23            Current Outpatient Medications   Medication Sig    amikacin liposomal/neb.accessr (ARIKAYCE INHL) Inhale into the lungs.    atorvastatin (LIPITOR) 10 MG tablet TAKE 1 TABLET BY MOUTH EVERY DAY    biotin 1 mg tablet Take 1,000 mcg by mouth 3 (three) times daily.    calcium phosphate trib/vit D3 (CALTRATE GUMMY BITES ORAL) Take 2 tablets by mouth once daily.    celecoxib (CELEBREX) 200 MG capsule Take  200 mg by mouth.    hydroCHLOROthiazide (HYDRODIURIL) 25 MG tablet Take 1 tablet (25 mg total) by mouth once daily.    metoprolol succinate (TOPROL-XL) 50 MG 24 hr tablet Take 1 tablet (50 mg total) by mouth once daily.    mv-min/iron/folic/calcium/vitK (WOMEN'S MULTIVITAMIN ORAL) Take 1 tablet by mouth once daily.    omadacycline 150 mg Tab Take 2 tablets (300 mg) by mouth once daily on an empty stomach for chronic pulmonary infection.    SPIRIVA RESPIMAT 2.5 mcg/actuation inhaler     tedizolid (SIVEXTRO) 200 mg Tab Take 1 tablet Every Monday, Wednesday, and Friday by mouth   Last reviewed on 6/29/2023  6:46 AM by Mariam Nunes CRNA    Review of patient's allergies indicates:   Allergen Reactions    Adhesive Rash     Tape & bandaids  Tape & bandaids    Albuterol Other (See Comments)     faint    Ciprofloxacin Other (See Comments)     Central nervous system issues    Last reviewed on  6/29/2023 7:46 AM by Terri Curran      Tasks added this encounter   No tasks added.   Tasks due within next 3 months   7/20/2023 - Refill Coordination Outreach (1 time occurrence)     Gaby Olivares, PharmD  [facility] [address] [facility] - Specialty Pharmacy  [address] [place] [id number]  Phone: [phone]  Fax: [phone]

## 2023-07-31 ENCOUNTER — SPECIALTY PHARMACY (OUTPATIENT)
Dept: PHARMACY | Facility: CLINIC | Age: 78
End: 2023-07-31
Payer: MEDICARE

## 2023-07-31 NOTE — TELEPHONE ENCOUNTER
Specialty Pharmacy - Refill Coordination    Specialty Medication Orders Linked to Encounter      Flowsheet Row Most Recent Value   Medication #1 omadacycline 150 mg Tab (Order#[id number], Rx#[id number])            Refill Questions - Documented Responses      Flowsheet Row Most Recent Value   Refill Screening Questions    Changes to allergies? No   Changes to medications? No   New conditions since last clinic visit? No   Unplanned office visit, urgent care, ED, or hospital admission in the last 4 weeks? No   How does patient/caregiver feel medication is working? Very good   How many doses of your specialty medications were missed in the last 4 weeks? 0   Would patient like to speak to a pharmacist? No   When does the patient need to receive the medication? 08/04/23   Refill Delivery Questions    How will the patient receive the medication? MEDRx   When does the patient need to receive the medication? 08/04/23   Shipping Address Home   Address in [facility] confirmed and updated if neccessary? Yes   Expected Copay ($) 410.08   Is the patient able to afford the medication copay? Yes   Payment Method CC on file   Days supply of Refill 15   Supplies needed? No supplies needed   Refill activity completed? Yes   Refill activity plan Refill scheduled   Shipment/Pickup Date: 08/01/23            Current Outpatient Medications   Medication Sig    amikacin liposomal/neb.accessr (ARIKAYCE INHL) Inhale into the lungs.    atorvastatin (LIPITOR) 10 MG tablet TAKE 1 TABLET BY MOUTH EVERY DAY    biotin 1 mg tablet Take 1,000 mcg by mouth 3 (three) times daily.    calcium phosphate trib/vit D3 (CALTRATE GUMMY BITES ORAL) Take 2 tablets by mouth once daily.    celecoxib (CELEBREX) 200 MG capsule Take 200 mg by mouth.    hydroCHLOROthiazide (HYDRODIURIL) 25 MG tablet Take 1 tablet (25 mg total) by mouth once daily.    metoprolol succinate (TOPROL-XL) 50 MG 24 hr tablet Take 1 tablet (50 mg total) by mouth once daily.     mv-min/iron/folic/calcium/vitK (WOMEN'S MULTIVITAMIN ORAL) Take 1 tablet by mouth once daily.    omadacycline 150 mg Tab Take 2 tablets (300 mg) by mouth once daily on an empty stomach for chronic pulmonary infection.    SPIRIVA RESPIMAT 2.5 mcg/actuation inhaler     tedizolid (SIVEXTRO) 200 mg Tab Take 1 tablet Every Monday, Wednesday, and Friday by mouth   Last reviewed on 6/29/2023  6:46 AM by Mariam Nunes CRNA    Review of patient's allergies indicates:   Allergen Reactions    Adhesive Rash     Tape & bandaids  Tape & bandaids    Albuterol Other (See Comments)     faint    Ciprofloxacin Other (See Comments)     Central nervous system issues    Last reviewed on  6/29/2023 7:46 AM by Terri Curran      Tasks added this encounter   No tasks added.   Tasks due within next 3 months   7/30/2023 - Refill Coordination Outreach (1 time occurrence)     [facility]  [address]  [facility] [address]  Phone: [phone]  Fax: [phone]

## 2023-08-09 ENCOUNTER — CLINICAL SUPPORT (OUTPATIENT)
Dept: AUDIOLOGY | Facility: CLINIC | Age: 78
End: 2023-08-09
Payer: MEDICARE

## 2023-08-09 ENCOUNTER — OFFICE VISIT (OUTPATIENT)
Dept: OTOLARYNGOLOGY | Facility: CLINIC | Age: 78
End: 2023-08-09
Payer: MEDICARE

## 2023-08-09 DIAGNOSIS — H91.90 HEARING LOSS, UNSPECIFIED HEARING LOSS TYPE, UNSPECIFIED LATERALITY: ICD-10-CM

## 2023-08-09 DIAGNOSIS — H93.19 TINNITUS, UNSPECIFIED LATERALITY: ICD-10-CM

## 2023-08-09 DIAGNOSIS — H93.12 SUBJECTIVE TINNITUS OF LEFT EAR: ICD-10-CM

## 2023-08-09 DIAGNOSIS — H90.3 SENSORINEURAL HEARING LOSS (SNHL) OF BOTH EARS: Primary | ICD-10-CM

## 2023-08-09 DIAGNOSIS — H90.3 SENSORINEURAL HEARING LOSS (SNHL), BILATERAL: Primary | ICD-10-CM

## 2023-08-09 PROCEDURE — 99212 OFFICE O/P EST SF 10 MIN: CPT | Mod: PBBFAC,27 | Performed by: OTOLARYNGOLOGY

## 2023-08-09 PROCEDURE — 99212 OFFICE O/P EST SF 10 MIN: CPT | Mod: PBBFAC

## 2023-08-09 PROCEDURE — 92557 COMPREHENSIVE HEARING TEST: CPT | Mod: PBBFAC

## 2023-08-09 PROCEDURE — 99999 PR PBB SHADOW E&M-EST. PATIENT-LVL II: ICD-10-PCS | Mod: PBBFAC,,, | Performed by: OTOLARYNGOLOGY

## 2023-08-09 PROCEDURE — 99204 PR OFFICE/OUTPT VISIT, NEW, LEVL IV, 45-59 MIN: ICD-10-PCS | Mod: S$PBB,,, | Performed by: OTOLARYNGOLOGY

## 2023-08-09 PROCEDURE — 92567 TYMPANOMETRY: CPT | Mod: PBBFAC

## 2023-08-09 PROCEDURE — 99999 PR PBB SHADOW E&M-EST. PATIENT-LVL II: CPT | Mod: PBBFAC,,,

## 2023-08-09 PROCEDURE — 99999 PR PBB SHADOW E&M-EST. PATIENT-LVL II: ICD-10-PCS | Mod: PBBFAC,,,

## 2023-08-09 PROCEDURE — 99999 PR PBB SHADOW E&M-EST. PATIENT-LVL II: CPT | Mod: PBBFAC,,, | Performed by: OTOLARYNGOLOGY

## 2023-08-09 PROCEDURE — 99204 OFFICE O/P NEW MOD 45 MIN: CPT | Mod: S$PBB,,, | Performed by: OTOLARYNGOLOGY

## 2023-08-09 NOTE — PROGRESS NOTES
Subjective     Patient ID: Marly Maciel is a 77 y.o. female.    Chief Complaint: Hearing Loss and Tinnitus    HPI: Hx of Shayne roaring tinn.    And HL since starting Amikacin Rx.    Pos bothersome.    Past Medical History: Patient has a past medical history of Hashimoto's thyroiditis, Hyperlipidemia, Hypertension, Internal hemorrhoids, and Osteopenia.    Past Surgical History: Patient has a past surgical history that includes Total abdominal hysterectomy (1990); Appendectomy (1960); TONSILLECTOMY, ADENOIDECTOMY (1963); Cholecystectomy (1997); partial parathyroidectomy (Right); Colonoscopy (09/04/2018); Esophagogastroduodenoscopy (09/04/2018); Peripherally inserted central catheter insertion (05/07/2019); Eye surgery (2001); Tubal ligation (1979); Insertion of tunneled central venous catheter (CVC) with subcutaneous port (Left, 07/27/2021); Insertion of tunneled central venous catheter (CVC) with subcutaneous port (Right, 08/20/2021); Removal of peripherally inserted central catheter (Left, 08/20/2021); Peripherally inserted central catheter insertion (Right, 08/11/2022); Lung removal, partial (Right, 11/01/2022); Mediport removal (N/A, 12/19/2022); and Esophagogastroduodenoscopy (N/A, 6/29/2023).    Social History: Patient reports that she has never smoked. She has never used smokeless tobacco. She reports current alcohol use of about 1.0 standard drink of alcohol per week. She reports that she does not use drugs.    Family History: family history includes Heart attack (age of onset: 67) in her father; Heart disease in her father; Stroke in her mother.    Medications:   Current Outpatient Medications   Medication Sig    amikacin liposomal/neb.accessr (ARIKAYCE INHL) Inhale into the lungs.    atorvastatin (LIPITOR) 10 MG tablet TAKE 1 TABLET BY MOUTH EVERY DAY    biotin 1 mg tablet Take 1,000 mcg by mouth 3 (three) times daily.    calcium phosphate trib/vit D3 (CALTRATE GUMMY BITES ORAL) Take 2 tablets by mouth  once daily.    celecoxib (CELEBREX) 200 MG capsule Take 200 mg by mouth.    hydroCHLOROthiazide (HYDRODIURIL) 25 MG tablet Take 1 tablet (25 mg total) by mouth once daily.    metoprolol succinate (TOPROL-XL) 50 MG 24 hr tablet Take 1 tablet (50 mg total) by mouth once daily.    mv-min/iron/folic/calcium/vitK (WOMEN'S MULTIVITAMIN ORAL) Take 1 tablet by mouth once daily.    omadacycline 150 mg Tab Take 2 tablets (300 mg) by mouth once daily on an empty stomach for chronic pulmonary infection.    SPIRIVA RESPIMAT 2.5 mcg/actuation inhaler     tedizolid (SIVEXTRO) 200 mg Tab Take 1 tablet Every Monday, Wednesday, and Friday by mouth     No current facility-administered medications for this visit.       Allergies: Patient is allergic to adhesive, albuterol, and ciprofloxacin.    Review of Systems   Constitutional: Negative.  Negative for appetite change, chills, fatigue and fever.   HENT:  Positive for hearing loss. Negative for nasal congestion, ear discharge, facial swelling, postnasal drip, rhinorrhea, sore throat, tinnitus and trouble swallowing.    Eyes: Negative.  Negative for photophobia, pain, discharge, redness, itching and visual disturbance.   Respiratory:  Positive for cough and shortness of breath. Negative for apnea, choking, chest tightness, wheezing and stridor.    Cardiovascular: Negative.  Negative for chest pain and palpitations.   Gastrointestinal: Negative.  Negative for abdominal pain, constipation, diarrhea, nausea and vomiting.   Endocrine: Negative.    Genitourinary: Negative.    Musculoskeletal: Negative.  Negative for arthralgias, gait problem, joint swelling, myalgias, neck pain and neck stiffness.   Integumentary:  Negative for color change, pallor, rash and wound. Negative.   Allergic/Immunologic: Negative.    Neurological: Negative.  Negative for dizziness, tremors, seizures, syncope, facial asymmetry, speech difficulty, weakness, light-headedness, numbness and headaches.   Hematological:  Negative.  Negative for adenopathy. Does not bruise/bleed easily.   Psychiatric/Behavioral: Negative.  Negative for agitation, behavioral problems, confusion, decreased concentration, dysphoric mood, hallucinations, sleep disturbance and suicidal ideas. The patient is not nervous/anxious and is not hyperactive.           Objective     Physical Exam  Vitals and nursing note reviewed.   Constitutional:       General: She is not in acute distress.     Appearance: Normal appearance. She is well-developed. She is not ill-appearing, toxic-appearing or diaphoretic.   HENT:      Head: Normocephalic and atraumatic. Not macrocephalic and not microcephalic. No raccoon eyes, Hdez's sign, abrasion, contusion, right periorbital erythema, left periorbital erythema or laceration. Hair is normal.      Right Ear: Ear canal normal. Decreased hearing noted. No laceration, drainage, swelling or tenderness. No middle ear effusion. No foreign body. No mastoid tenderness. No hemotympanum. Tympanic membrane is not injected, scarred, perforated, erythematous, retracted or bulging. Tympanic membrane has normal mobility.      Left Ear: Ear canal normal. Decreased hearing noted. No laceration, drainage, swelling or tenderness.  No middle ear effusion. No foreign body. No mastoid tenderness. No hemotympanum. Tympanic membrane is not injected, scarred, perforated, erythematous, retracted or bulging. Tympanic membrane has normal mobility.      Nose: No nasal deformity, septal deviation, laceration, mucosal edema or rhinorrhea.      Right Sinus: No maxillary sinus tenderness.      Left Sinus: No maxillary sinus tenderness.   Eyes:      General: Lids are normal.      Conjunctiva/sclera: Conjunctivae normal.      Pupils: Pupils are equal, round, and reactive to light.   Neck:      Thyroid: No thyroid mass or thyromegaly.      Vascular: No JVD.      Trachea: No tracheal tenderness or tracheal deviation.   Cardiovascular:      Rate and Rhythm: Normal  rate and regular rhythm.   Pulmonary:      Effort: Pulmonary effort is normal. No tachypnea, bradypnea, accessory muscle usage or respiratory distress.      Breath sounds: No stridor.   Abdominal:      Palpations: Abdomen is soft.   Musculoskeletal:         General: Normal range of motion.      Cervical back: Normal range of motion and neck supple. No edema, erythema or rigidity. No muscular tenderness. Normal range of motion.   Lymphadenopathy:      Head:      Right side of head: No submental, submandibular, tonsillar, preauricular or posterior auricular adenopathy.      Left side of head: No submental, submandibular, tonsillar, preauricular or posterior auricular adenopathy.      Cervical: No cervical adenopathy.      Right cervical: No superficial, deep or posterior cervical adenopathy.     Left cervical: No superficial, deep or posterior cervical adenopathy.   Skin:     General: Skin is warm and dry.      Coloration: Skin is not pale.      Findings: No abrasion, bruising, burn, ecchymosis, erythema, laceration, lesion or rash.   Neurological:      Mental Status: She is alert and oriented to person, place, and time.      Cranial Nerves: No cranial nerve deficit.      Sensory: No sensory deficit.      Motor: No tremor, atrophy, abnormal muscle tone or seizure activity.   Psychiatric:         Speech: She is communicative. Speech is not rapid and pressured or slurred.         Behavior: Behavior normal. Behavior is cooperative.         Thought Content: Thought content normal.         Judgment: Judgment normal.              Assessment and Plan     1. Sensorineural hearing loss (SNHL) of both ears    2. Subjective tinnitus of left ear        Discussed with patient multiple tinnitus management strategies including the use of maskers, instruments, background sound enrichment and other means. All questions answered and appropriate references given.    Patient to see Audiology for hearing aid evaluation.           No  follow-ups on file.

## 2023-08-09 NOTE — Clinical Note
Good morning Dr. Pereira,   Here are the results from Mrs. Maciel's hearing evaluation. Please let me know if you need any further information or have any questions. Thanks!  Alli Mendoza

## 2023-08-09 NOTE — PROGRESS NOTES
Marly Maciel, a 77 y.o. female, was seen today in the clinic for an audiologic evaluation.  The patient's main complaint was constant tinnitus in her head.  Mrs. Maciel reported the tinnitus sounds like either a waterfall or a swarm of locusts.  She noted she is on amikacin which she reported has side effects including hearing loss.  Mrs. Maciel also reported it sounds as if she is under water.  The patient denied otalgia, aural fullness, and dizziness.     Tympanometry revealed Type A in the right ear and Type A in the left ear.  Audiogram results revealed a normal sloping to severe sensorineural hearing loss (SNHL), bilaterally.  Speech reception thresholds were noted at 20 dB in the right ear and 15 dB in the left ear.  Speech discrimination scores were 88% in the right ear and 96% in the left ear.    Recommendations:  Otologic evaluation  Hearing protection when in noise  Hearing aid consultation  Annual audiogram or sooner if change in hearing is perceived

## 2023-08-14 ENCOUNTER — SPECIALTY PHARMACY (OUTPATIENT)
Dept: PHARMACY | Facility: CLINIC | Age: 78
End: 2023-08-14
Payer: MEDICARE

## 2023-08-14 NOTE — TELEPHONE ENCOUNTER
Specialty Pharmacy - Refill Coordination    Specialty Medication Orders Linked to Encounter      Flowsheet Row Most Recent Value   Medication #1 omadacycline 150 mg Tab (Order#[id number], Rx#[id number])            Refill Questions - Documented Responses      Flowsheet Row Most Recent Value   Patient Availability and HIPAA Verification    Does patient want to proceed with activity? Yes   HIPAA/medical authority confirmed? Yes   Relationship to patient of person spoken to? Self   Refill Screening Questions    Changes to allergies? No   Changes to medications? No   New conditions since last clinic visit? No   Unplanned office visit, urgent care, ED, or hospital admission in the last 4 weeks? No   How does patient/caregiver feel medication is working? Good   Financial problems or insurance changes? No   How many doses of your specialty medications were missed in the last 4 weeks? 0   Would patient like to speak to a pharmacist? No   When does the patient need to receive the medication? 08/18/23   Refill Delivery Questions    How will the patient receive the medication? MEDRx   When does the patient need to receive the medication? 08/18/23   Shipping Address Home   Address in [facility] confirmed and updated if neccessary? Yes   Expected Copay ($) 410.08   Is the patient able to afford the medication copay? Yes   Payment Method CC on file   Days supply of Refill 15   Refill activity completed? Yes   Refill activity plan Refill scheduled   Shipment/Pickup Date: 08/16/23            Current Outpatient Medications   Medication Sig    amikacin liposomal/neb.accessr (ARIKAYCE INHL) Inhale into the lungs.    atorvastatin (LIPITOR) 10 MG tablet TAKE 1 TABLET BY MOUTH EVERY DAY    biotin 1 mg tablet Take 1,000 mcg by mouth 3 (three) times daily.    calcium phosphate trib/vit D3 (CALTRATE GUMMY BITES ORAL) Take 2 tablets by mouth once daily.    celecoxib (CELEBREX) 200 MG capsule Take 200 mg by mouth.    hydroCHLOROthiazide  (HYDRODIURIL) 25 MG tablet Take 1 tablet (25 mg total) by mouth once daily.    metoprolol succinate (TOPROL-XL) 50 MG 24 hr tablet Take 1 tablet (50 mg total) by mouth once daily.    mv-min/iron/folic/calcium/vitK (WOMEN'S MULTIVITAMIN ORAL) Take 1 tablet by mouth once daily.    omadacycline 150 mg Tab Take 2 tablets (300 mg) by mouth once daily on an empty stomach for chronic pulmonary infection.    SPIRIVA RESPIMAT 2.5 mcg/actuation inhaler     tedizolid (SIVEXTRO) 200 mg Tab Take 1 tablet Every Monday, Wednesday, and Friday by mouth   Last reviewed on 8/9/2023  9:25 AM by Veronica Henderson, MA    Review of patient's allergies indicates:   Allergen Reactions    Adhesive Rash     Tape & bandaids  Tape & bandaids    Albuterol Other (See Comments)     faint    Ciprofloxacin Other (See Comments)     Central nervous system issues    Last reviewed on  8/9/2023 9:24 AM by Veronica Henderson      Tasks added this encounter   No tasks added.   Tasks due within next 3 months   No tasks due.     Aggie Ng  [facility]  [address] [place] [id number]  Phone: [phone]  Fax: [phone]

## 2023-08-21 ENCOUNTER — CLINICAL SUPPORT (OUTPATIENT)
Dept: AUDIOLOGY | Facility: CLINIC | Age: 78
End: 2023-08-21
Payer: MEDICARE

## 2023-08-21 DIAGNOSIS — H90.3 SENSORINEURAL HEARING LOSS (SNHL), BILATERAL: Primary | ICD-10-CM

## 2023-08-21 NOTE — PROGRESS NOTES
HEARING AID CONSULTATION  Marly PRINGLE Raheem was seen today for a hearing aid consult.  The patient's hearing test was reviewed.  Hearing aid options/recommendations, the 30 day trial period, and the $250 deposit were discussed with the patient.  The patient decided to purchase performance level hearing aids.  Binaural Phonak Audeo L-50 rechargeable hearing aids will be ordered in color H0 for the patient.  The patient paid the $250 deposit and the hearing aid fitting was scheduled.  I advised the patient to contact me if she has any questions and/or concerns.       Recommendations:  Wear hearing protection in noise  Hearing aid fitting as scheduled  Annual audiogram or sooner if change in hearing is perceived

## 2023-09-11 ENCOUNTER — CLINICAL SUPPORT (OUTPATIENT)
Dept: AUDIOLOGY | Facility: CLINIC | Age: 78
End: 2023-09-11
Payer: MEDICARE

## 2023-09-11 DIAGNOSIS — H90.3 SENSORINEURAL HEARING LOSS (SNHL), BILATERAL: Primary | ICD-10-CM

## 2023-09-11 NOTE — PROGRESS NOTES
HEARING AID FITTING  Marly Maciel was seen today for a hearing aid fitting.  The hearing aids were connected to the software via [employer].  The feedback test was run for both aids.  The MPO was raised to target for both aids.  The low frequency gain and soft gain were decreased for both aids.  The medium and loud gain was increased for both ears.  The patient reported her speech and my speech sounded reverberant so the gain was decreased for reverberant speech at all frequencies.  The sounds and signals of the hearing aids were reviewed. The patient was happy with the sound quality of the aids so the settings were saved.  Originally the patient was in M2 receivers and medium domes.  The M2 receivers were replaced with M1 receivers and the medium domes were replaced with small domes.  The hearing aids were reconnected to the software via [employer] wireless and the feedback test was re-run for both aids.  The patient was happy with the sound quality and fit of the aids so the settings were saved.  Proper care/maintenance, insertion/removal, how to change the volume, charging, how to turn the aids on and off, and the 30 day trial period were reviewed with the patient.  The hearing aids were paired to the patient's phone.  A phone call was made to the patient's phone to demonstrate how to stream phone calls with the hearing aids.  The [employer] keerthi was downloaded and the hearing aids were paired to the keerthi.  The keerthi settings were reviewed.  The patient did not like hearing notifications from apps on her phone in her hearing aids.  She was shown how to change her notification settings so that she does not hear them in her hearing aids.  The patient signed the purchase agreement and paid the remaining hearing aid balance.  A follow-up was scheduled.           Recommendations:  Wear hearing protection in noise  Proper daily use of binaural amplification  Hearing aid follow-up as scheduled or sooner if  needed  Annual audiogram or sooner if change in hearing is perceived     Hearing Aid Details   & Model: Phonak L50-R   Color: Beige   Rt SN: [id number]   Lt SN: [id number]   Battery: Rechargeable   Rt  and Dome: M1, small open  Lt  and Dome: M1, small open  Repair Warranty Exp: 11-   L&D Warranty Exp: 11-      SN: [id number]

## 2023-09-12 ENCOUNTER — OFFICE VISIT (OUTPATIENT)
Dept: INTERNAL MEDICINE | Facility: CLINIC | Age: 78
End: 2023-09-12
Attending: INTERNAL MEDICINE
Payer: MEDICARE

## 2023-09-12 ENCOUNTER — DOCUMENTATION ONLY (OUTPATIENT)
Dept: AUDIOLOGY | Facility: CLINIC | Age: 78
End: 2023-09-12
Payer: MEDICARE

## 2023-09-12 VITALS
WEIGHT: 130.31 LBS | OXYGEN SATURATION: 97 % | DIASTOLIC BLOOD PRESSURE: 80 MMHG | HEIGHT: 65 IN | SYSTOLIC BLOOD PRESSURE: 130 MMHG | HEART RATE: 67 BPM | BODY MASS INDEX: 21.71 KG/M2

## 2023-09-12 DIAGNOSIS — E06.3 HASHIMOTO'S THYROIDITIS: ICD-10-CM

## 2023-09-12 DIAGNOSIS — E78.5 HYPERLIPIDEMIA, UNSPECIFIED HYPERLIPIDEMIA TYPE: ICD-10-CM

## 2023-09-12 DIAGNOSIS — M85.80 OSTEOPENIA, UNSPECIFIED LOCATION: ICD-10-CM

## 2023-09-12 DIAGNOSIS — Z51.81 ENCOUNTER FOR MEDICATION MONITORING: Primary | ICD-10-CM

## 2023-09-12 DIAGNOSIS — A31.0 MYCOBACTERIAL DISEASE, PULMONARY: ICD-10-CM

## 2023-09-12 DIAGNOSIS — Z90.2 STATUS POST PARTIAL LOBECTOMY OF LUNG: ICD-10-CM

## 2023-09-12 DIAGNOSIS — I10 HYPERTENSION, UNSPECIFIED TYPE: ICD-10-CM

## 2023-09-12 DIAGNOSIS — E55.9 VITAMIN D DEFICIENCY DISEASE: ICD-10-CM

## 2023-09-12 DIAGNOSIS — I70.0 ATHEROSCLEROSIS OF AORTA: ICD-10-CM

## 2023-09-12 DIAGNOSIS — A15.9 ATYPICAL TB: ICD-10-CM

## 2023-09-12 PROCEDURE — 99213 OFFICE O/P EST LOW 20 MIN: CPT | Mod: PBBFAC | Performed by: INTERNAL MEDICINE

## 2023-09-12 PROCEDURE — 99999 PR PBB SHADOW E&M-EST. PATIENT-LVL III: CPT | Mod: PBBFAC,,, | Performed by: INTERNAL MEDICINE

## 2023-09-12 PROCEDURE — 99999 PR PBB SHADOW E&M-EST. PATIENT-LVL III: ICD-10-PCS | Mod: PBBFAC,,, | Performed by: INTERNAL MEDICINE

## 2023-09-12 PROCEDURE — 99215 PR OFFICE/OUTPT VISIT, EST, LEVL V, 40-54 MIN: ICD-10-PCS | Mod: S$PBB,,, | Performed by: INTERNAL MEDICINE

## 2023-09-12 PROCEDURE — 99215 OFFICE O/P EST HI 40 MIN: CPT | Mod: S$PBB,,, | Performed by: INTERNAL MEDICINE

## 2023-09-12 RX ORDER — AZITHROMYCIN 500 MG/1
TABLET, FILM COATED ORAL
COMMUNITY
Start: 2023-09-06

## 2023-09-12 RX ORDER — ETHAMBUTOL HYDROCHLORIDE 400 MG/1
1200 TABLET, FILM COATED ORAL
COMMUNITY
Start: 2023-08-30

## 2023-09-12 NOTE — PROGRESS NOTES
"Subjective:   Patient ID: Marly Maciel is a 77 y.o. female  Chief complaint:   Chief Complaint   Patient presents with    Hypertension     F/u       HPI  Here for 3 month follow up of htn:  Will need labs every 4 weeks for monitoring while on meds     Taking biotin, caltrate, mvi - stopped it all   Still walking >1mi on most days     DOWNEY/SOB:   Seen by Dr. Shultz 9//7/2023 for downey/sob:  - to have echo and stress echo as ordered     Hearing loss:   ENT Dr. Denson 8/9/2023   - started with hearing aids yesterday     Dysphagia: stable   Did see GI - Dr. Hamilton   Had mbss:   No tracheal aspiration witnessed with any consistency.  Prominent cricopharyngeus  - see speech pathology note for full details.  - EGD at end June     Hx of pulm mac, s/p lobectomy in [place]:   - sob - increased - seen by pulm as above - pfts excellent   + cultures again   - now taking azithro, ethambutol, tedizolid, omadacycline, amikacin inhaler   - needs cbc and cmp monthly - will order and follow   - now followed by Dr. Petty at [facility] as Dr. 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 Rosales is retiring and now to be folloewd by Dr. Gerardo Mari   "Treatment through [facility].  Is now on Omodacyclin/ Tidezolid and inhaled amikacin   Off Clof  styopped by Dr SMITH  On Kirsten daily   with the following   SPIRIVA / ACT and then KIRSTEN daily Shortness of breath  11/1/22- Had RLLobectomy for M. Abscesssus. Being seen at [facility] q 3mo. On omadacylcine plus tidezolid " "daily.  Now on arikayce three times per week because of courhg. This improved with three times per week regimen.  Seen by Son 2 weeks ago. Told sputum culture neg since 1/23. CT scan 2 weeks ago showed possible hydropneumothorax. Not clear that her pulmonologist agreed with this. Has no pumonary symptoms. Cough is better. Brings up 1 tabsp daily. No blood. No العراقي. Notes sob with bending over.   No fever, chills, nite sweats. No wt loss  Had recent esoph mannometry study wich she needs to review with GI. No DAVID symptoms"    HTN:  Taking Bb and hctz 3 times per week  Home bp at goal   -  Not needed ekg as not on clofazamine at this time - if resume will let me know and will reorder ekg at that time to check q 1-3 months moving forward    Osteopenia:   - no longer taking caltrate, biotin, and MVI   - Dexa 7/2022     Aortic ectasia - ascending aorta - seen on CT report 7/2019 from [place] and reviewed last few CT scans - none seen on follow CT scan 2022 and this year  8/2023  - bp controlled   - sunshine statin     Atherosclerosis, HLD:   sunshine statin      Hypothyroidism, hx of hashimotos and hyperparathyroidism dx 2/2 hyperCa diagnosed and treated with surgery 4/8/2014 [facility] and s/p parathyroidectomy and partial thyroidectomy:  - tsh wnl   - not requiring levothyroxine at this time     #### not addressed today ####    Mole:   Had bx and benign per pt;  Prev:   Had flat mole left suprapubic region   No drainage   Not itchy   Just noticed mole - fairly new   Derm: Wahl     Macular degeneration:   - followed by Dr. Nascimento   - on eye supplement      Cervical DJD:   - improved - no longer on celebrex - using [employer] book stand   Previously:   Attended PT   Followed by ortho - seen by ortho after PT at [facility]  Had CT scan of neck and went to receive injection - not improved and managing at this time    #####    HM:  Shingrix   Flu   Covid booster in Oct  Rsv   Mmg - repeat in 6 months     Review of " "Systems    Objective:  Vitals:    09/12/23 0826   BP: 130/80   BP Location: Left arm   Patient Position: Sitting   Pulse: 67   SpO2: 97%   Weight: 59.1 kg (130 lb 4.7 oz)   Height: 5' 5" (1.651 m)     Body mass index is 21.68 kg/m².    Physical Exam  Vitals reviewed.   Constitutional:       Appearance: Normal appearance. She is well-developed.   HENT:      Head: Normocephalic and atraumatic.      Right Ear: Tympanic membrane, ear canal and external ear normal.      Left Ear: Tympanic membrane, ear canal and external ear normal.      Nose: Nose normal. No congestion.      Mouth/Throat:      Mouth: Mucous membranes are moist.      Pharynx: Oropharynx is clear. No oropharyngeal exudate.   Eyes:      Extraocular Movements: Extraocular movements intact.      Conjunctiva/sclera: Conjunctivae normal.   Neck:      Thyroid: No thyromegaly.   Cardiovascular:      Rate and Rhythm: Normal rate and regular rhythm.      Pulses: Normal pulses.      Heart sounds: Normal heart sounds.   Pulmonary:      Effort: Pulmonary effort is normal. No respiratory distress.      Breath sounds: Normal breath sounds. No stridor. No wheezing or rales.      Comments: Talking in complete sentences  Abdominal:      General: Bowel sounds are normal.      Palpations: Abdomen is soft.   Musculoskeletal:         General: No swelling or tenderness.      Cervical back: Neck supple.   Lymphadenopathy:      Cervical: No cervical adenopathy.   Skin:     General: Skin is warm and dry.      Capillary Refill: Capillary refill takes less than 2 seconds.   Neurological:      General: No focal deficit present.      Mental Status: She is alert and oriented to person, place, and time. Mental status is at baseline.   Psychiatric:         Behavior: Behavior normal.         Thought Content: Thought content normal.       Assessment:  1. Encounter for medication monitoring    2. Hyperlipidemia, unspecified hyperlipidemia type    3. Hypertension, unspecified type    4. " Atypical TB    5. Hashimoto's thyroiditis    6. Osteopenia, unspecified location    7. Vitamin D deficiency disease    8. Atherosclerosis of aorta    9. Mycobacterial disease, pulmonary    10. Status post partial lobectomy of lung            Plan:    Marly was seen today for hypertension.    Diagnoses and all orders for this visit:    Encounter for medication monitoring  -     CBC Auto Differential; Standing  -     Comprehensive Metabolic Panel; Standing  Check labs q4 weeks for med monitoring   Labs in     Hyperlipidemia, unspecified hyperlipidemia type  -     Lipid Panel; Future  Stable   Cont statin     Hypertension, unspecified type  Stable   Cont meds     Atypical TB  F/u with specialists   Stable     Hashimoto's thyroiditis  Stable   Monitor labs   Asymptomatic     Osteopenia, unspecified location  Stable   Cont ca/ and vit d   Dexa q2y    Vitamin D deficiency disease  -     Vitamin D; Future  Stable cont vit d suppl     Atherosclerosis of aorta  Stable cont statin     Mycobacterial disease, pulmonary    Status post partial lobectomy of lung  Stable   Cont f/u with pulm   Labs q4 weeks    50 min spent in care of patient including chart review, history, orders, physical, orders and coordination of care    Health Maintenance   Topic Date Due    Shingles Vaccine (1 of 2) Never done    Mammogram  08/31/2024    High Dose Statin  09/12/2024    DEXA Scan  07/06/2025    TETANUS VACCINE  06/14/2026    Lipid Panel  07/05/2027    Colorectal Cancer Screening  09/04/2028    Hepatitis C Screening  Completed

## 2023-09-12 NOTE — PROGRESS NOTES
Pt contacted me and let me know that after she left the hearing aid fitting yesterday she heard a clicking sound in the right hearing aid whenever she or  talked. She reported she stopped wearing the hearing aid because the clicking sound bothered her. The patient was scheduled to come in for a hearing aid follow-up so that we can make adjustments.

## 2023-09-14 ENCOUNTER — SPECIALTY PHARMACY (OUTPATIENT)
Dept: PHARMACY | Facility: CLINIC | Age: 78
End: 2023-09-14
Payer: MEDICARE

## 2023-09-14 NOTE — TELEPHONE ENCOUNTER
Specialty Pharmacy - Refill Coordination    Specialty Medication Orders Linked to Encounter      Flowsheet Row Most Recent Value   Medication #1 omadacycline 150 mg Tab (Order#[id number], Rx#[id number])            Refill Questions - Documented Responses      Flowsheet Row Most Recent Value   Patient Availability and HIPAA Verification    Does patient want to proceed with activity? Yes   HIPAA/medical authority confirmed? Yes   Relationship to patient of person spoken to? Self   Refill Screening Questions    Changes to allergies? No   Changes to medications? No   New conditions since last clinic visit? No   Unplanned office visit, urgent care, ED, or hospital admission in the last 4 weeks? No   How does patient/caregiver feel medication is working? Good   Financial problems or insurance changes? No   How many doses of your specialty medications were missed in the last 4 weeks? 0   When does the patient need to receive the medication? 09/19/23   Refill Delivery Questions    How will the patient receive the medication? MEDRx   When does the patient need to receive the medication? 09/19/23   Shipping Address Home   Address in [facility] confirmed and updated if neccessary? Yes   Expected Copay ($) 410.08   Is the patient able to afford the medication copay? Yes   Payment Method CC on file   Days supply of Refill 30   Supplies needed? No supplies needed   Refill activity completed? Yes   Refill activity plan Refill scheduled   Shipment/Pickup Date: 09/15/23            Current Outpatient Medications   Medication Sig    amikacin liposomal/neb.accessr (ARIKAYCE INHL) Inhale into the lungs.    atorvastatin (LIPITOR) 10 MG tablet TAKE 1 TABLET BY MOUTH EVERY DAY    azithromycin (ZITHROMAX) 500 MG tablet Take by mouth.    biotin 1 mg tablet Take 1,000 mcg by mouth 3 (three) times daily.    calcium phosphate trib/vit D3 (CALTRATE GUMMY BITES ORAL) Take 2 tablets by mouth once daily.    celecoxib (CELEBREX) 200 MG capsule  Take 200 mg by mouth.    ethambutoL (MYAMBUTOL) 400 MG Tab Take 1,200 mg by mouth.    hydroCHLOROthiazide (HYDRODIURIL) 25 MG tablet Take 1 tablet (25 mg total) by mouth once daily.    metoprolol succinate (TOPROL-XL) 50 MG 24 hr tablet Take 1 tablet (50 mg total) by mouth once daily.    mv-min/iron/folic/calcium/vitK (WOMEN'S MULTIVITAMIN ORAL) Take 1 tablet by mouth once daily.    omadacycline 150 mg Tab Take 2 tablets (300 mg) by mouth once daily on an empty stomach for chronic pulmonary infection.    SPIRIVA RESPIMAT 2.5 mcg/actuation inhaler     tedizolid (SIVEXTRO) 200 mg Tab Take 1 tablet Every Monday, Wednesday, and Friday by mouth   Last reviewed on 9/12/2023  9:29 AM by Stacy Pereira MD    Review of patient's allergies indicates:   Allergen Reactions    Adhesive Rash     Tape & bandaids  Tape & bandaids    Albuterol Other (See Comments)     faint    Ciprofloxacin Other (See Comments)     Central nervous system issues    Last reviewed on  9/12/2023 9:29 AM by Stacy Pereira      Tasks added this encounter   No tasks added.   Tasks due within next 3 months   No tasks due.     Abbey Lewis, PharmD  [facility]  [address] [place] [id number]  Phone: [phone]  Fax: [phone]

## 2023-09-25 RX ORDER — OMADACYCLINE 150 MG/1
300 TABLET, FILM COATED ORAL DAILY
Qty: 60 TABLET | Refills: 3 | Status: CANCELLED | OUTPATIENT
Start: 2023-09-25

## 2023-09-29 RX ORDER — OMADACYCLINE 150 MG/1
300 TABLET, FILM COATED ORAL DAILY
Qty: 60 TABLET | Refills: 3 | OUTPATIENT
Start: 2023-09-29 | End: 2024-01-23 | Stop reason: ALTCHOICE

## 2023-10-04 ENCOUNTER — CLINICAL SUPPORT (OUTPATIENT)
Dept: AUDIOLOGY | Facility: CLINIC | Age: 78
End: 2023-10-04
Payer: MEDICARE

## 2023-10-04 DIAGNOSIS — H90.3 SENSORINEURAL HEARING LOSS (SNHL), BILATERAL: Primary | ICD-10-CM

## 2023-10-04 PROCEDURE — 99499 UNLISTED E&M SERVICE: CPT | Mod: S$PBB,,,

## 2023-10-04 PROCEDURE — 99499 NO LOS: ICD-10-PCS | Mod: S$PBB,,,

## 2023-10-05 ENCOUNTER — LAB VISIT (OUTPATIENT)
Dept: LAB | Facility: HOSPITAL | Age: 78
End: 2023-10-05
Attending: INTERNAL MEDICINE
Payer: MEDICARE

## 2023-10-05 DIAGNOSIS — Z51.81 ENCOUNTER FOR MEDICATION MONITORING: ICD-10-CM

## 2023-10-05 DIAGNOSIS — E55.9 VITAMIN D DEFICIENCY DISEASE: ICD-10-CM

## 2023-10-05 DIAGNOSIS — E78.5 HYPERLIPIDEMIA, UNSPECIFIED HYPERLIPIDEMIA TYPE: ICD-10-CM

## 2023-10-05 LAB
25(OH)D3+25(OH)D2 SERPL-MCNC: 34 NG/ML (ref 30–96)
ALBUMIN SERPL BCP-MCNC: 3.7 G/DL (ref 3.5–5.2)
ALP SERPL-CCNC: 78 U/L (ref 55–135)
ALT SERPL W/O P-5'-P-CCNC: 40 U/L (ref 10–44)
ANION GAP SERPL CALC-SCNC: 7 MMOL/L (ref 8–16)
AST SERPL-CCNC: 35 U/L (ref 10–40)
BASOPHILS # BLD AUTO: 0.04 K/UL (ref 0–0.2)
BASOPHILS NFR BLD: 0.9 % (ref 0–1.9)
BILIRUB SERPL-MCNC: 0.6 MG/DL (ref 0.1–1)
BUN SERPL-MCNC: 14 MG/DL (ref 8–23)
CALCIUM SERPL-MCNC: 9.3 MG/DL (ref 8.7–10.5)
CHLORIDE SERPL-SCNC: 103 MMOL/L (ref 95–110)
CHOLEST SERPL-MCNC: 158 MG/DL (ref 120–199)
CHOLEST/HDLC SERPL: 2.3 {RATIO} (ref 2–5)
CO2 SERPL-SCNC: 30 MMOL/L (ref 23–29)
CREAT SERPL-MCNC: 0.9 MG/DL (ref 0.5–1.4)
DIFFERENTIAL METHOD: ABNORMAL
EOSINOPHIL # BLD AUTO: 0.1 K/UL (ref 0–0.5)
EOSINOPHIL NFR BLD: 2.7 % (ref 0–8)
ERYTHROCYTE [DISTWIDTH] IN BLOOD BY AUTOMATED COUNT: 12.6 % (ref 11.5–14.5)
EST. GFR  (NO RACE VARIABLE): >60 ML/MIN/1.73 M^2
GLUCOSE SERPL-MCNC: 90 MG/DL (ref 70–110)
HCT VFR BLD AUTO: 46.8 % (ref 37–48.5)
HDLC SERPL-MCNC: 68 MG/DL (ref 40–75)
HDLC SERPL: 43 % (ref 20–50)
HGB BLD-MCNC: 15.7 G/DL (ref 12–16)
IMM GRANULOCYTES # BLD AUTO: 0.01 K/UL (ref 0–0.04)
IMM GRANULOCYTES NFR BLD AUTO: 0.2 % (ref 0–0.5)
LDLC SERPL CALC-MCNC: 75.2 MG/DL (ref 63–159)
LYMPHOCYTES # BLD AUTO: 1.1 K/UL (ref 1–4.8)
LYMPHOCYTES NFR BLD: 25.9 % (ref 18–48)
MCH RBC QN AUTO: 32.4 PG (ref 27–31)
MCHC RBC AUTO-ENTMCNC: 33.5 G/DL (ref 32–36)
MCV RBC AUTO: 97 FL (ref 82–98)
MONOCYTES # BLD AUTO: 0.3 K/UL (ref 0.3–1)
MONOCYTES NFR BLD: 7.7 % (ref 4–15)
NEUTROPHILS # BLD AUTO: 2.8 K/UL (ref 1.8–7.7)
NEUTROPHILS NFR BLD: 62.6 % (ref 38–73)
NONHDLC SERPL-MCNC: 90 MG/DL
NRBC BLD-RTO: 0 /100 WBC
PLATELET # BLD AUTO: 179 K/UL (ref 150–450)
PMV BLD AUTO: 9.4 FL (ref 9.2–12.9)
POTASSIUM SERPL-SCNC: 4.7 MMOL/L (ref 3.5–5.1)
PROT SERPL-MCNC: 6.8 G/DL (ref 6–8.4)
RBC # BLD AUTO: 4.84 M/UL (ref 4–5.4)
SODIUM SERPL-SCNC: 140 MMOL/L (ref 136–145)
TRIGL SERPL-MCNC: 74 MG/DL (ref 30–150)
WBC # BLD AUTO: 4.41 K/UL (ref 3.9–12.7)

## 2023-10-05 PROCEDURE — 36415 COLL VENOUS BLD VENIPUNCTURE: CPT | Mod: PN | Performed by: INTERNAL MEDICINE

## 2023-10-05 PROCEDURE — 80053 COMPREHEN METABOLIC PANEL: CPT | Performed by: INTERNAL MEDICINE

## 2023-10-05 PROCEDURE — 82306 VITAMIN D 25 HYDROXY: CPT | Performed by: INTERNAL MEDICINE

## 2023-10-05 PROCEDURE — 85025 COMPLETE CBC W/AUTO DIFF WBC: CPT | Performed by: INTERNAL MEDICINE

## 2023-10-05 PROCEDURE — 80061 LIPID PANEL: CPT | Performed by: INTERNAL MEDICINE

## 2023-10-07 DIAGNOSIS — E78.5 HYPERLIPIDEMIA, UNSPECIFIED HYPERLIPIDEMIA TYPE: ICD-10-CM

## 2023-10-07 RX ORDER — ATORVASTATIN CALCIUM 10 MG/1
TABLET, FILM COATED ORAL
Qty: 90 TABLET | Refills: 3 | Status: SHIPPED | OUTPATIENT
Start: 2023-10-07

## 2023-10-07 NOTE — TELEPHONE ENCOUNTER
No care due was identified.  [facility] Embedded Care Due Messages. Reference number: [id number].   10/07/2023 1:53:52 PM CDT

## 2023-10-08 NOTE — TELEPHONE ENCOUNTER
Refill Decision Note   Marly Maciel  is requesting a refill authorization.  Brief Assessment and Rationale for Refill:  Approve     Medication Therapy Plan:         Comments:     Note composed:11:51 PM 10/07/2023

## 2023-11-30 ENCOUNTER — PATIENT MESSAGE (OUTPATIENT)
Dept: INTERNAL MEDICINE | Facility: CLINIC | Age: 78
End: 2023-11-30
Payer: MEDICARE

## 2023-11-30 DIAGNOSIS — A31.0 MYCOBACTERIAL DISEASE, PULMONARY: Primary | ICD-10-CM

## 2023-11-30 RX ORDER — SODIUM CHLORIDE FOR INHALATION 7 %
4 VIAL, NEBULIZER (ML) INHALATION DAILY
Qty: 240 ML | Refills: 11 | Status: SHIPPED | OUTPATIENT
Start: 2023-11-30

## 2023-12-02 RX ORDER — TEDIZOLID PHOSPHATE 200 MG/1
TABLET, FILM COATED ORAL
Qty: 12 TABLET | Refills: 5 | Status: CANCELLED | OUTPATIENT
Start: 2023-12-02

## 2023-12-05 ENCOUNTER — LAB VISIT (OUTPATIENT)
Dept: LAB | Facility: HOSPITAL | Age: 78
End: 2023-12-05
Attending: INTERNAL MEDICINE
Payer: MEDICARE

## 2023-12-05 DIAGNOSIS — Z51.81 ENCOUNTER FOR MEDICATION MONITORING: ICD-10-CM

## 2023-12-05 LAB
BASOPHILS # BLD AUTO: 0.03 K/UL (ref 0–0.2)
BASOPHILS NFR BLD: 0.7 % (ref 0–1.9)
DIFFERENTIAL METHOD: ABNORMAL
EOSINOPHIL # BLD AUTO: 0.1 K/UL (ref 0–0.5)
EOSINOPHIL NFR BLD: 1.5 % (ref 0–8)
ERYTHROCYTE [DISTWIDTH] IN BLOOD BY AUTOMATED COUNT: 12.9 % (ref 11.5–14.5)
HCT VFR BLD AUTO: 42.4 % (ref 37–48.5)
HGB BLD-MCNC: 15.3 G/DL (ref 12–16)
IMM GRANULOCYTES # BLD AUTO: 0.01 K/UL (ref 0–0.04)
IMM GRANULOCYTES NFR BLD AUTO: 0.2 % (ref 0–0.5)
LYMPHOCYTES # BLD AUTO: 1.2 K/UL (ref 1–4.8)
LYMPHOCYTES NFR BLD: 27 % (ref 18–48)
MCH RBC QN AUTO: 32.5 PG (ref 27–31)
MCHC RBC AUTO-ENTMCNC: 36.1 G/DL (ref 32–36)
MCV RBC AUTO: 90 FL (ref 82–98)
MONOCYTES # BLD AUTO: 0.4 K/UL (ref 0.3–1)
MONOCYTES NFR BLD: 9.3 % (ref 4–15)
NEUTROPHILS # BLD AUTO: 2.8 K/UL (ref 1.8–7.7)
NEUTROPHILS NFR BLD: 61.3 % (ref 38–73)
NRBC BLD-RTO: 0 /100 WBC
PLATELET # BLD AUTO: 181 K/UL (ref 150–450)
PMV BLD AUTO: 9.6 FL (ref 9.2–12.9)
RBC # BLD AUTO: 4.71 M/UL (ref 4–5.4)
WBC # BLD AUTO: 4.6 K/UL (ref 3.9–12.7)

## 2023-12-05 PROCEDURE — 85025 COMPLETE CBC W/AUTO DIFF WBC: CPT | Performed by: INTERNAL MEDICINE

## 2023-12-05 PROCEDURE — 36415 COLL VENOUS BLD VENIPUNCTURE: CPT | Mod: PN | Performed by: INTERNAL MEDICINE

## 2024-01-08 ENCOUNTER — PATIENT MESSAGE (OUTPATIENT)
Dept: ADMINISTRATIVE | Facility: OTHER | Age: 79
End: 2024-01-08
Payer: MEDICARE

## 2024-01-09 ENCOUNTER — TELEPHONE (OUTPATIENT)
Dept: INTERNAL MEDICINE | Facility: CLINIC | Age: 79
End: 2024-01-09
Payer: MEDICARE

## 2024-01-09 ENCOUNTER — LAB VISIT (OUTPATIENT)
Dept: LAB | Facility: HOSPITAL | Age: 79
End: 2024-01-09
Attending: INTERNAL MEDICINE
Payer: MEDICARE

## 2024-01-09 DIAGNOSIS — Z51.81 ENCOUNTER FOR MEDICATION MONITORING: ICD-10-CM

## 2024-01-09 LAB
BASOPHILS # BLD AUTO: 0.05 K/UL (ref 0–0.2)
BASOPHILS NFR BLD: 1.1 % (ref 0–1.9)
DIFFERENTIAL METHOD BLD: ABNORMAL
EOSINOPHIL # BLD AUTO: 0.1 K/UL (ref 0–0.5)
EOSINOPHIL NFR BLD: 1.9 % (ref 0–8)
ERYTHROCYTE [DISTWIDTH] IN BLOOD BY AUTOMATED COUNT: 13.1 % (ref 11.5–14.5)
HCT VFR BLD AUTO: 44 % (ref 37–48.5)
HGB BLD-MCNC: 15 G/DL (ref 12–16)
IMM GRANULOCYTES # BLD AUTO: 0 K/UL (ref 0–0.04)
IMM GRANULOCYTES NFR BLD AUTO: 0 % (ref 0–0.5)
LYMPHOCYTES # BLD AUTO: 1.4 K/UL (ref 1–4.8)
LYMPHOCYTES NFR BLD: 28.7 % (ref 18–48)
MCH RBC QN AUTO: 33 PG (ref 27–31)
MCHC RBC AUTO-ENTMCNC: 34.1 G/DL (ref 32–36)
MCV RBC AUTO: 97 FL (ref 82–98)
MONOCYTES # BLD AUTO: 0.5 K/UL (ref 0.3–1)
MONOCYTES NFR BLD: 10 % (ref 4–15)
NEUTROPHILS # BLD AUTO: 2.7 K/UL (ref 1.8–7.7)
NEUTROPHILS NFR BLD: 58.3 % (ref 38–73)
NRBC BLD-RTO: 0 /100 WBC
PLATELET # BLD AUTO: 197 K/UL (ref 150–450)
PMV BLD AUTO: 9.8 FL (ref 9.2–12.9)
RBC # BLD AUTO: 4.54 M/UL (ref 4–5.4)
WBC # BLD AUTO: 4.7 K/UL (ref 3.9–12.7)

## 2024-01-09 PROCEDURE — 85025 COMPLETE CBC W/AUTO DIFF WBC: CPT | Performed by: INTERNAL MEDICINE

## 2024-01-09 PROCEDURE — 36415 COLL VENOUS BLD VENIPUNCTURE: CPT | Mod: PN | Performed by: INTERNAL MEDICINE

## 2024-01-09 NOTE — TELEPHONE ENCOUNTER
----- Message from Katy Chase sent at 1/8/2024  1:23 PM CST -----  Regarding: appt  Name of Who is Calling:ALAN MACIEL [[id number]]          What is the request in detail: pt has an appt on 1/16/24. She is asking if she can give the slot to her  Kris Maciel [birth year], and then her r/s her appt as well for a later date. Please call to assist           Can the clinic reply by [facility]:          What Number to Call Back [facility] not [facility]: [phone]

## 2024-01-09 NOTE — TELEPHONE ENCOUNTER
Left message on voicemail to let pt know that I switch their appt's per her request. We will see him at 8am on 1/16. Advised she call back to r/s her visit.

## 2024-02-06 ENCOUNTER — LAB VISIT (OUTPATIENT)
Dept: LAB | Facility: HOSPITAL | Age: 79
End: 2024-02-06
Attending: INTERNAL MEDICINE
Payer: MEDICARE

## 2024-02-06 DIAGNOSIS — Z51.81 ENCOUNTER FOR MEDICATION MONITORING: ICD-10-CM

## 2024-02-06 LAB
BASOPHILS # BLD AUTO: 0.05 K/UL (ref 0–0.2)
BASOPHILS NFR BLD: 1 % (ref 0–1.9)
DIFFERENTIAL METHOD BLD: ABNORMAL
EOSINOPHIL # BLD AUTO: 0.2 K/UL (ref 0–0.5)
EOSINOPHIL NFR BLD: 3.1 % (ref 0–8)
ERYTHROCYTE [DISTWIDTH] IN BLOOD BY AUTOMATED COUNT: 12.3 % (ref 11.5–14.5)
HCT VFR BLD AUTO: 46.6 % (ref 37–48.5)
HGB BLD-MCNC: 15.8 G/DL (ref 12–16)
IMM GRANULOCYTES # BLD AUTO: 0.01 K/UL (ref 0–0.04)
IMM GRANULOCYTES NFR BLD AUTO: 0.2 % (ref 0–0.5)
LYMPHOCYTES # BLD AUTO: 1.5 K/UL (ref 1–4.8)
LYMPHOCYTES NFR BLD: 28.8 % (ref 18–48)
MCH RBC QN AUTO: 32.5 PG (ref 27–31)
MCHC RBC AUTO-ENTMCNC: 33.9 G/DL (ref 32–36)
MCV RBC AUTO: 96 FL (ref 82–98)
MONOCYTES # BLD AUTO: 0.6 K/UL (ref 0.3–1)
MONOCYTES NFR BLD: 10.7 % (ref 4–15)
NEUTROPHILS # BLD AUTO: 3 K/UL (ref 1.8–7.7)
NEUTROPHILS NFR BLD: 56.2 % (ref 38–73)
NRBC BLD-RTO: 0 /100 WBC
PLATELET # BLD AUTO: 192 K/UL (ref 150–450)
PMV BLD AUTO: 9.3 FL (ref 9.2–12.9)
RBC # BLD AUTO: 4.86 M/UL (ref 4–5.4)
WBC # BLD AUTO: 5.24 K/UL (ref 3.9–12.7)

## 2024-02-06 PROCEDURE — 85025 COMPLETE CBC W/AUTO DIFF WBC: CPT | Performed by: INTERNAL MEDICINE

## 2024-02-06 PROCEDURE — 36415 COLL VENOUS BLD VENIPUNCTURE: CPT | Mod: PN | Performed by: INTERNAL MEDICINE

## 2024-02-22 ENCOUNTER — OFFICE VISIT (OUTPATIENT)
Dept: INTERNAL MEDICINE | Facility: CLINIC | Age: 79
End: 2024-02-22
Attending: INTERNAL MEDICINE
Payer: MEDICARE

## 2024-02-22 VITALS
DIASTOLIC BLOOD PRESSURE: 72 MMHG | HEIGHT: 65 IN | HEART RATE: 59 BPM | BODY MASS INDEX: 22.15 KG/M2 | WEIGHT: 132.94 LBS | OXYGEN SATURATION: 97 % | SYSTOLIC BLOOD PRESSURE: 124 MMHG

## 2024-02-22 DIAGNOSIS — E06.3 HASHIMOTO'S THYROIDITIS: ICD-10-CM

## 2024-02-22 DIAGNOSIS — Z97.4 WEARS HEARING AID: ICD-10-CM

## 2024-02-22 DIAGNOSIS — I10 HYPERTENSION, UNSPECIFIED TYPE: ICD-10-CM

## 2024-02-22 DIAGNOSIS — M85.80 OSTEOPENIA, UNSPECIFIED LOCATION: ICD-10-CM

## 2024-02-22 DIAGNOSIS — A31.0 MYCOBACTERIAL DISEASE, PULMONARY: ICD-10-CM

## 2024-02-22 DIAGNOSIS — Z51.81 MEDICATION MONITORING ENCOUNTER: Primary | ICD-10-CM

## 2024-02-22 DIAGNOSIS — Z12.31 ENCOUNTER FOR SCREENING MAMMOGRAM FOR MALIGNANT NEOPLASM OF BREAST: ICD-10-CM

## 2024-02-22 DIAGNOSIS — H91.93 BILATERAL HEARING LOSS, UNSPECIFIED HEARING LOSS TYPE: ICD-10-CM

## 2024-02-22 DIAGNOSIS — E78.5 HYPERLIPIDEMIA, UNSPECIFIED HYPERLIPIDEMIA TYPE: ICD-10-CM

## 2024-02-22 DIAGNOSIS — I70.0 ATHEROSCLEROSIS OF AORTA: ICD-10-CM

## 2024-02-22 DIAGNOSIS — R13.10 DYSPHAGIA, UNSPECIFIED TYPE: ICD-10-CM

## 2024-02-22 DIAGNOSIS — R25.2 CRAMPING OF FEET: ICD-10-CM

## 2024-02-22 PROCEDURE — 99215 OFFICE O/P EST HI 40 MIN: CPT | Mod: S$PBB,,, | Performed by: INTERNAL MEDICINE

## 2024-02-22 PROCEDURE — 99214 OFFICE O/P EST MOD 30 MIN: CPT | Mod: PBBFAC | Performed by: INTERNAL MEDICINE

## 2024-02-22 PROCEDURE — 99999 PR PBB SHADOW E&M-EST. PATIENT-LVL IV: CPT | Mod: PBBFAC,,, | Performed by: INTERNAL MEDICINE

## 2024-02-22 NOTE — PROGRESS NOTES
"Subjective:   Patient ID: Marly Maciel is a 78 y.o. female  Chief complaint:   Chief Complaint   Patient presents with    Hypertension     F/u       HPI  Here for 6 month follow up of htn:    Will need labs every 4 weeks for monitoring while on meds - cbc and bmp   Good news and off of 2 of 5 abx and to come back in 6 months and area on left lower lobe has imprved  PREVIOUSLY:   Hx of pulm mac, s/p lobectomy in [place]:   - sob - increased - seen by pulm as above - pfts excellent   + cultures again   - now taking azithro, ethambutol, tedizolid, omadacycline, amikacin inhaler   - needs cbc and cmp monthly - will order and follow   - now followed by Dr. Petty at [facility] as Dr. 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 Rosales is retiring and now to be folloewd by Dr. Gerardo Mari   "Treatment through [facility].  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 Being seen at [facility] q 3mo. On omadacylcine plus tidezolid daily.  Now on arikayce three times per week because of courhg. This improved with three times per week regimen.  Seen by Son 2 weeks ago. Told sputum culture neg since 1/23. CT scan 2 weeks ago showed possible hydropneumothorax. Not clear that her pulmonologist agreed with this. Has no pumonary symptoms. Cough is better. Brings up 1 tabsp daily. No " "blood. No DOWNEY. Notes sob with bending over.   No fever, chills, nite sweats. No wt loss  Had recent esoph mannometry study wich she needs to review with GI. No DAVID symptoms"    No longer taking biotin, caltrate, mvi - stopped it all   Still walking >1mi on most days     DOWNEY/SOB:   Seen by Dr. Shultz 9//7/2023 for downey/sob:  - did have echo and stress echo as ordered and wnl per her     Hearing loss:   ENT Dr. Denson 8/9/2023   - started with hearing aids yesterday     Dysphagia: stable   Did see GI - Dr. Hamilton   Had mbss:   No tracheal aspiration witnessed with any consistency.  Prominent cricopharyngeus  - see speech pathology note for full details.  - EGD at end June     HTN:  Taking Bb and hctz 3 times per week  Home bp at goal   -  Not needed ekg as not on clofazamine at this time - if resume will let me know and will reorder ekg at that time to check q 1-3 months moving forward    Osteopenia:   - no longer taking caltrate, biotin, and MVI   - Dexa 7/2022     Aortic ectasia - ascending aorta   - had CT 2/2024 and 11/2023 - latter reported ascending aorta uln 3.9cm  - CT 7/2018 with aortic root 4cm and stable on 11/2018  - seen on [place] 7/2019 from [place] and reviewed last few CT scans - no progression reported on follow CT scan 2022 and  8/2023  - bp controlled   - sunshine statin     Atherosclerosis, HLD:   sunshine statin      Hypothyroidism, hx of hashimotos and hyperparathyroidism dx 2/2 hyperCa diagnosed and treated with surgery 4/8/2014 at [facility] and s/p parathyroidectomy and partial thyroidectomy:  - tsh wnl   - not requiring levothyroxine at this time     #### not addressed today ####    Mole:   Had bx and benign per pt;  Prev:   Had flat mole left suprapubic region   No drainage   Not itchy   Just noticed mole - fairly new   Derm: Wahl     Macular degeneration:   - followed by Dr. Nascimento   - on eye supplement      Cervical DJD:   - improved - no longer on celebrex - using [employer] book stand " "  Previously:   Attended PT   Followed by ortho - seen by ortho after PT at [facility]  Had CT scan of neck and went to receive injection - not improved and managing at this time    #####    HM:  Shingrix   Rsv   Mmg - repeat in 6 months 8/2024    Review of Systems    Objective:  Vitals:    02/22/24 0755   BP: 124/72   BP Location: Left arm   Patient Position: Sitting   Pulse: (!) 59   SpO2: 97%   Weight: 60.3 kg (132 lb 15 oz)   Height: 5' 5" (1.651 m)     Body mass index is 22.12 kg/m².    Physical Exam  Vitals reviewed.   Constitutional:       Appearance: Normal appearance. She is well-developed.   HENT:      Head: Normocephalic and atraumatic.      Right Ear: Tympanic membrane, ear canal and external ear normal.      Left Ear: Tympanic membrane, ear canal and external ear normal.      Nose: Nose normal. No congestion.      Mouth/Throat:      Mouth: Mucous membranes are moist.      Pharynx: Oropharynx is clear. No oropharyngeal exudate.   Eyes:      Extraocular Movements: Extraocular movements intact.      Conjunctiva/sclera: Conjunctivae normal.   Neck:      Thyroid: No thyromegaly.   Cardiovascular:      Rate and Rhythm: Normal rate and regular rhythm.      Pulses: Normal pulses.      Heart sounds: Normal heart sounds.   Pulmonary:      Effort: Pulmonary effort is normal. No respiratory distress.      Breath sounds: Normal breath sounds. No stridor. No wheezing or rales.      Comments: Talking in complete sentences  Abdominal:      General: Bowel sounds are normal.      Palpations: Abdomen is soft.   Musculoskeletal:         General: No swelling or tenderness.      Cervical back: Neck supple.   Lymphadenopathy:      Cervical: No cervical adenopathy.   Skin:     General: Skin is warm and dry.      Capillary Refill: Capillary refill takes less than 2 seconds.   Neurological:      General: No focal deficit present.      Mental Status: She is alert and oriented to person, place, and time. Mental status is at " baseline.   Psychiatric:         Behavior: Behavior normal.         Thought Content: Thought content normal.       Assessment:  1. Medication monitoring encounter    2. Cramping of feet    3. Mycobacterial disease, pulmonary    4. Bilateral hearing loss, unspecified hearing loss type    5. Wears hearing aid    6. Dysphagia, unspecified type    7. Hypertension, unspecified type    8. Osteopenia, unspecified location    9. Hyperlipidemia, unspecified hyperlipidemia type    10. Hashimoto's thyroiditis    11. Atherosclerosis of aorta    12. Encounter for screening mammogram for malignant neoplasm of breast      Marly was seen today for hypertension.    Diagnoses and all orders for this visit:    Medication monitoring encounter  -     CBC Auto Differential; Standing  -     Basic Metabolic Panel; Standing    Cramping of feet  -     CK; Future  -     Magnesium; Future  Check labs max oxide 200-400mg dialy     Mycobacterial disease, pulmonary  F/u with pulm as per hpi   Labs to monitor abx ordered     Bilateral hearing loss, unspecified hearing loss type  As below     Wears hearing aid  To obtain jameson   F/u with audiology     Dysphagia, unspecified type  Improved   S/p gi yolie - Dr. Hamilton    Hypertension, unspecified type  Stable   Cont med     Osteopenia, unspecified location  Dexa q2y  Cont ca; and vit d   Exercise     Hyperlipidemia, unspecified hyperlipidemia type  Stable   Cont statin     Hashimoto's thyroiditis  Stabl e  Cont med     Atherosclerosis of aorta  Stable   Cont statin   Encounter for screening mammogram for malignant neoplasm of breast  -     Mammo Digital Screening Bilat w/ [facility]; Future    Aortic ectasia: monitoring as having reg CT chest - size variable on chart review - reported as 4cm initially and 3.9 on most recent   - cont monitoring and will refer to specialist if increases - ocnt bp and statin mgmt     40 min spent in care of patient including chart review, history, orders, physical, orders and  coordination of care    Health Maintenance   Topic Date Due    Shingles Vaccine (1 of 2) Never done    Mammogram  08/31/2024    DEXA Scan  07/06/2025    TETANUS VACCINE  06/14/2026    Colonoscopy  09/04/2028    Lipid Panel  10/05/2028    Hepatitis C Screening  Completed

## 2024-03-05 ENCOUNTER — LAB VISIT (OUTPATIENT)
Dept: LAB | Facility: HOSPITAL | Age: 79
End: 2024-03-05
Attending: INTERNAL MEDICINE
Payer: MEDICARE

## 2024-03-05 DIAGNOSIS — R25.2 CRAMPING OF FEET: ICD-10-CM

## 2024-03-05 DIAGNOSIS — Z51.81 MEDICATION MONITORING ENCOUNTER: ICD-10-CM

## 2024-03-05 LAB
ANION GAP SERPL CALC-SCNC: 7 MMOL/L (ref 8–16)
BASOPHILS # BLD AUTO: 0.05 K/UL (ref 0–0.2)
BASOPHILS NFR BLD: 1 % (ref 0–1.9)
BUN SERPL-MCNC: 19 MG/DL (ref 8–23)
CALCIUM SERPL-MCNC: 9.6 MG/DL (ref 8.7–10.5)
CHLORIDE SERPL-SCNC: 101 MMOL/L (ref 95–110)
CK SERPL-CCNC: 50 U/L (ref 20–180)
CO2 SERPL-SCNC: 31 MMOL/L (ref 23–29)
CREAT SERPL-MCNC: 0.8 MG/DL (ref 0.5–1.4)
DIFFERENTIAL METHOD BLD: ABNORMAL
EOSINOPHIL # BLD AUTO: 0.1 K/UL (ref 0–0.5)
EOSINOPHIL NFR BLD: 1.8 % (ref 0–8)
ERYTHROCYTE [DISTWIDTH] IN BLOOD BY AUTOMATED COUNT: 12.2 % (ref 11.5–14.5)
EST. GFR  (NO RACE VARIABLE): >60 ML/MIN/1.73 M^2
GLUCOSE SERPL-MCNC: 86 MG/DL (ref 70–110)
HCT VFR BLD AUTO: 47.6 % (ref 37–48.5)
HGB BLD-MCNC: 16 G/DL (ref 12–16)
IMM GRANULOCYTES # BLD AUTO: 0.01 K/UL (ref 0–0.04)
IMM GRANULOCYTES NFR BLD AUTO: 0.2 % (ref 0–0.5)
LYMPHOCYTES # BLD AUTO: 1.2 K/UL (ref 1–4.8)
LYMPHOCYTES NFR BLD: 23.2 % (ref 18–48)
MAGNESIUM SERPL-MCNC: 1.8 MG/DL (ref 1.6–2.6)
MCH RBC QN AUTO: 32.9 PG (ref 27–31)
MCHC RBC AUTO-ENTMCNC: 33.6 G/DL (ref 32–36)
MCV RBC AUTO: 98 FL (ref 82–98)
MONOCYTES # BLD AUTO: 0.5 K/UL (ref 0.3–1)
MONOCYTES NFR BLD: 9.2 % (ref 4–15)
NEUTROPHILS # BLD AUTO: 3.3 K/UL (ref 1.8–7.7)
NEUTROPHILS NFR BLD: 64.6 % (ref 38–73)
NRBC BLD-RTO: 0 /100 WBC
PLATELET # BLD AUTO: 193 K/UL (ref 150–450)
PMV BLD AUTO: 10.3 FL (ref 9.2–12.9)
POTASSIUM SERPL-SCNC: 4.3 MMOL/L (ref 3.5–5.1)
RBC # BLD AUTO: 4.87 M/UL (ref 4–5.4)
SODIUM SERPL-SCNC: 139 MMOL/L (ref 136–145)
WBC # BLD AUTO: 5.09 K/UL (ref 3.9–12.7)

## 2024-03-05 PROCEDURE — 82550 ASSAY OF CK (CPK): CPT | Performed by: INTERNAL MEDICINE

## 2024-03-05 PROCEDURE — 83735 ASSAY OF MAGNESIUM: CPT | Performed by: INTERNAL MEDICINE

## 2024-03-05 PROCEDURE — 85025 COMPLETE CBC W/AUTO DIFF WBC: CPT | Performed by: INTERNAL MEDICINE

## 2024-03-05 PROCEDURE — 80048 BASIC METABOLIC PNL TOTAL CA: CPT | Performed by: INTERNAL MEDICINE

## 2024-03-05 PROCEDURE — 36415 COLL VENOUS BLD VENIPUNCTURE: CPT | Mod: PN | Performed by: INTERNAL MEDICINE

## 2024-03-13 ENCOUNTER — PATIENT MESSAGE (OUTPATIENT)
Dept: INTERNAL MEDICINE | Facility: CLINIC | Age: 79
End: 2024-03-13
Payer: MEDICARE

## 2024-03-13 DIAGNOSIS — J47.9 BRONCHIECTASIS WITHOUT COMPLICATION: Primary | ICD-10-CM

## 2024-03-13 DIAGNOSIS — J45.991 COUGH VARIANT ASTHMA: ICD-10-CM

## 2024-03-13 DIAGNOSIS — R00.2 PALPITATIONS: ICD-10-CM

## 2024-03-13 RX ORDER — METOPROLOL SUCCINATE 50 MG/1
50 TABLET, EXTENDED RELEASE ORAL DAILY
Qty: 90 TABLET | Refills: 3 | Status: SHIPPED | OUTPATIENT
Start: 2024-03-13

## 2024-03-13 NOTE — TELEPHONE ENCOUNTER
No care due was identified.  [facility] Embedded Care Due Messages. Reference number: [id number].   3/13/2024 1:03:58 PM CDT

## 2024-03-14 NOTE — TELEPHONE ENCOUNTER
Refill Decision Note   Marly Maciel  is requesting a refill authorization.  Brief Assessment and Rationale for Refill:  Approve     Medication Therapy Plan:         Comments:     Note composed:7:20 PM 03/13/2024

## 2024-03-18 DIAGNOSIS — I10 HYPERTENSION, UNSPECIFIED TYPE: ICD-10-CM

## 2024-03-18 PROBLEM — Z97.4 WEARS HEARING AID: Status: ACTIVE | Noted: 2024-03-18

## 2024-03-18 PROBLEM — H91.93 BILATERAL HEARING LOSS: Status: ACTIVE | Noted: 2024-03-18

## 2024-03-18 PROBLEM — R13.10 DYSPHAGIA: Status: ACTIVE | Noted: 2024-03-18

## 2024-03-18 NOTE — TELEPHONE ENCOUNTER
No care due was identified.  Health [facility] Embedded Care Due Messages. Reference number: [id number].   3/18/2024 10:44:06 AM CDT

## 2024-03-19 RX ORDER — HYDROCHLOROTHIAZIDE 25 MG/1
25 TABLET ORAL DAILY
Qty: 90 TABLET | Refills: 3 | Status: SHIPPED | OUTPATIENT
Start: 2024-03-19

## 2024-03-19 NOTE — TELEPHONE ENCOUNTER
I have reviewed and agree with the assessment below:     HCTZ 25 mg     This request will require the provider's attention. I have routed the encounter again to the provider for assessment.     Shy Flores   Medication Access Specialist

## 2024-03-19 NOTE — TELEPHONE ENCOUNTER
Refill Decision Note   Marly Maciel  is requesting a refill authorization.    Brief Assessment and Rationale for Refill:   Approve       Medication Therapy Plan:         Comments:     Note composed:10:14 AM 03/19/2024

## 2024-03-19 NOTE — TELEPHONE ENCOUNTER
Refill Routing Note   Medication(s) are not appropriate for processing by [facility] for the following reason(s):        No active prescription written by provider    [facility] action(s):  Defer        Medication Therapy Plan: Last ordered: 1 year ago (2/7/2023) by Stacy Pereira MD      Appointments  past 12m or future 3m with PCP    Date Provider   Last Visit   2/22/2024 Stacy Pereira MD   Next Visit   8/29/2024 Stacy Pereira MD   ED visits in past 90 days: 0        Note composed:10:12 AM 03/19/2024

## 2024-03-19 NOTE — TELEPHONE ENCOUNTER
Refill Routing Note   Medication(s) are not appropriate for processing by [facility] for the following reason(s):        No active prescription written by provider    [facility] action(s):  Defer               Appointments  past 12m or future 3m with PCP    Date Provider   Last Visit   2/22/2024 Stacy Pereira MD   Next Visit   8/29/2024 Stacy Pereira MD   ED visits in past 90 days: 0        Note composed:4:40 AM 03/19/2024

## 2024-04-15 ENCOUNTER — LAB VISIT (OUTPATIENT)
Dept: LAB | Facility: HOSPITAL | Age: 79
End: 2024-04-15
Attending: STUDENT IN AN ORGANIZED HEALTH CARE EDUCATION/TRAINING PROGRAM
Payer: MEDICARE

## 2024-04-15 DIAGNOSIS — J47.9 BRONCHIECTASIS WITHOUT COMPLICATION: ICD-10-CM

## 2024-04-15 DIAGNOSIS — J45.991 COUGH VARIANT ASTHMA: ICD-10-CM

## 2024-04-15 LAB — IGE SERPL-ACNC: <35 IU/ML (ref 0–100)

## 2024-04-15 PROCEDURE — 82785 ASSAY OF IGE: CPT | Performed by: STUDENT IN AN ORGANIZED HEALTH CARE EDUCATION/TRAINING PROGRAM

## 2024-04-15 PROCEDURE — 36415 COLL VENOUS BLD VENIPUNCTURE: CPT | Mod: PN | Performed by: STUDENT IN AN ORGANIZED HEALTH CARE EDUCATION/TRAINING PROGRAM

## 2024-04-15 PROCEDURE — 86003 ALLG SPEC IGE CRUDE XTRC EA: CPT | Performed by: STUDENT IN AN ORGANIZED HEALTH CARE EDUCATION/TRAINING PROGRAM

## 2024-04-18 LAB
A FUMIGATUS IGE QN: <0.1 KU/L
DEPRECATED A FUMIGATUS IGE RAST QL: NORMAL

## 2024-05-30 ENCOUNTER — PATIENT MESSAGE (OUTPATIENT)
Dept: INTERNAL MEDICINE | Facility: CLINIC | Age: 79
End: 2024-05-30
Payer: MEDICARE

## 2024-05-31 RX ORDER — TIOTROPIUM BROMIDE INHALATION SPRAY 3.12 UG/1
2 SPRAY, METERED RESPIRATORY (INHALATION) DAILY
Qty: 12 G | Refills: 0 | Status: SHIPPED | OUTPATIENT
Start: 2024-05-31

## 2024-05-31 NOTE — TELEPHONE ENCOUNTER
No care due was identified.  [facility] Embedded Care Due Messages. Reference number: [id number].   5/31/2024 6:50:54 AM CDT

## 2024-06-04 ENCOUNTER — PATIENT OUTREACH (OUTPATIENT)
Dept: ADMINISTRATIVE | Facility: HOSPITAL | Age: 79
End: 2024-06-04
Payer: MEDICARE

## 2024-06-04 NOTE — PROGRESS NOTES
Health Maintenance Due   Topic Date Due    Shingles Vaccine (1 of 2) Never done    RSV Vaccine (Age 60+ and Pregnant patients) (1 - 1-dose 60+ series) Never done    COVID-19 Vaccine (8 - 2023-24 season) 02/24/2024    Health Maintenance reviewed, updated and links triggered. HM'S up to date. ([place]) 6/4/24

## 2024-07-08 ENCOUNTER — PATIENT MESSAGE (OUTPATIENT)
Dept: INTERNAL MEDICINE | Facility: CLINIC | Age: 79
End: 2024-07-08
Payer: MEDICARE

## 2024-07-10 ENCOUNTER — LAB VISIT (OUTPATIENT)
Dept: LAB | Facility: HOSPITAL | Age: 79
End: 2024-07-10
Attending: INTERNAL MEDICINE
Payer: MEDICARE

## 2024-07-10 DIAGNOSIS — Z51.81 MEDICATION MONITORING ENCOUNTER: ICD-10-CM

## 2024-07-10 LAB
ANION GAP SERPL CALC-SCNC: 6 MMOL/L (ref 8–16)
BASOPHILS # BLD AUTO: 0.05 K/UL (ref 0–0.2)
BASOPHILS NFR BLD: 0.9 % (ref 0–1.9)
BUN SERPL-MCNC: 17 MG/DL (ref 8–23)
CALCIUM SERPL-MCNC: 9.4 MG/DL (ref 8.7–10.5)
CHLORIDE SERPL-SCNC: 103 MMOL/L (ref 95–110)
CO2 SERPL-SCNC: 30 MMOL/L (ref 23–29)
CREAT SERPL-MCNC: 0.9 MG/DL (ref 0.5–1.4)
DIFFERENTIAL METHOD BLD: ABNORMAL
EOSINOPHIL # BLD AUTO: 0.1 K/UL (ref 0–0.5)
EOSINOPHIL NFR BLD: 2.6 % (ref 0–8)
ERYTHROCYTE [DISTWIDTH] IN BLOOD BY AUTOMATED COUNT: 12.6 % (ref 11.5–14.5)
EST. GFR  (NO RACE VARIABLE): >60 ML/MIN/1.73 M^2
GLUCOSE SERPL-MCNC: 92 MG/DL (ref 70–110)
HCT VFR BLD AUTO: 46 % (ref 37–48.5)
HGB BLD-MCNC: 15.1 G/DL (ref 12–16)
IMM GRANULOCYTES # BLD AUTO: 0.01 K/UL (ref 0–0.04)
IMM GRANULOCYTES NFR BLD AUTO: 0.2 % (ref 0–0.5)
LYMPHOCYTES # BLD AUTO: 1.5 K/UL (ref 1–4.8)
LYMPHOCYTES NFR BLD: 27.3 % (ref 18–48)
MCH RBC QN AUTO: 32.5 PG (ref 27–31)
MCHC RBC AUTO-ENTMCNC: 32.8 G/DL (ref 32–36)
MCV RBC AUTO: 99 FL (ref 82–98)
MONOCYTES # BLD AUTO: 0.6 K/UL (ref 0.3–1)
MONOCYTES NFR BLD: 10.3 % (ref 4–15)
NEUTROPHILS # BLD AUTO: 3.2 K/UL (ref 1.8–7.7)
NEUTROPHILS NFR BLD: 58.7 % (ref 38–73)
NRBC BLD-RTO: 0 /100 WBC
PLATELET # BLD AUTO: 186 K/UL (ref 150–450)
PMV BLD AUTO: 10 FL (ref 9.2–12.9)
POTASSIUM SERPL-SCNC: 4.2 MMOL/L (ref 3.5–5.1)
RBC # BLD AUTO: 4.65 M/UL (ref 4–5.4)
SODIUM SERPL-SCNC: 139 MMOL/L (ref 136–145)
WBC # BLD AUTO: 5.45 K/UL (ref 3.9–12.7)

## 2024-07-10 PROCEDURE — 36415 COLL VENOUS BLD VENIPUNCTURE: CPT | Mod: PN | Performed by: INTERNAL MEDICINE

## 2024-07-10 PROCEDURE — 80048 BASIC METABOLIC PNL TOTAL CA: CPT | Performed by: INTERNAL MEDICINE

## 2024-07-10 PROCEDURE — 85025 COMPLETE CBC W/AUTO DIFF WBC: CPT | Performed by: INTERNAL MEDICINE

## 2024-07-11 ENCOUNTER — PATIENT MESSAGE (OUTPATIENT)
Dept: INTERNAL MEDICINE | Facility: CLINIC | Age: 79
End: 2024-07-11
Payer: MEDICARE

## 2024-08-24 DIAGNOSIS — J45.30 MILD PERSISTENT ASTHMA WITHOUT COMPLICATION: ICD-10-CM

## 2024-08-24 DIAGNOSIS — J45.31 MILD PERSISTENT ASTHMA WITH ACUTE EXACERBATION: Primary | ICD-10-CM

## 2024-08-24 NOTE — TELEPHONE ENCOUNTER
Care Due:                  Date            Visit Type   Department     Provider  --------------------------------------------------------------------------------                                EP -                              PRIMARY      [facility] INTERNAL  Last Visit: 02-      CARE ([facility])   MEDICINE       Stacy Pereira                              EP -                              PRIMARY      [facility] INTERNAL  Next Visit: 08-      CARE ([facility])   MEDICINE       Stacy Pereira                                                            Last  Test          Frequency    Reason                     Performed    Due Date  --------------------------------------------------------------------------------    CMP.........  12 months..  atorvastatin.............  10-   09-    Lipid Panel.  12 months..  atorvastatin.............  10-   09-    [employer] Embedded Care Due Messages. Reference number: [id number].   8/24/2024 10:56:13 AM CDT

## 2024-08-25 NOTE — TELEPHONE ENCOUNTER
Refill Routing Note   Medication(s) are not appropriate for processing by [facility] for the following reason(s):        No active prescription written by provider    [facility] action(s):  Defer   Requires labs : Yes             Appointments  past 12m or future 3m with PCP    Date Provider   Last Visit   2/22/2024 Stacy Pereira MD   Next Visit   8/29/2024 Stacy Pereira MD   ED visits in past 90 days: 0        Note composed:11:06 PM 08/24/2024

## 2024-08-26 RX ORDER — TIOTROPIUM BROMIDE INHALATION SPRAY 3.12 UG/1
2 SPRAY, METERED RESPIRATORY (INHALATION) DAILY
Qty: 12 G | Refills: 3 | Status: SHIPPED | OUTPATIENT
Start: 2024-08-26 | End: 2025-08-26

## 2024-08-29 ENCOUNTER — OFFICE VISIT (OUTPATIENT)
Dept: INTERNAL MEDICINE | Facility: CLINIC | Age: 79
End: 2024-08-29
Attending: INTERNAL MEDICINE
Payer: MEDICARE

## 2024-08-29 VITALS
SYSTOLIC BLOOD PRESSURE: 112 MMHG | HEART RATE: 57 BPM | HEIGHT: 65 IN | WEIGHT: 133.38 LBS | DIASTOLIC BLOOD PRESSURE: 76 MMHG | BODY MASS INDEX: 22.22 KG/M2 | OXYGEN SATURATION: 98 %

## 2024-08-29 DIAGNOSIS — I77.819 AORTIC ECTASIA: ICD-10-CM

## 2024-08-29 DIAGNOSIS — G62.9 NEUROPATHY: Primary | ICD-10-CM

## 2024-08-29 DIAGNOSIS — A31.0 MYCOBACTERIUM AVIUM COMPLEX: ICD-10-CM

## 2024-08-29 DIAGNOSIS — E55.9 VITAMIN D DEFICIENCY DISEASE: ICD-10-CM

## 2024-08-29 DIAGNOSIS — M85.80 OSTEOPENIA, UNSPECIFIED LOCATION: ICD-10-CM

## 2024-08-29 DIAGNOSIS — I10 HYPERTENSION, UNSPECIFIED TYPE: ICD-10-CM

## 2024-08-29 DIAGNOSIS — E06.3 HASHIMOTO'S THYROIDITIS: ICD-10-CM

## 2024-08-29 DIAGNOSIS — R20.2 PARESTHESIA OF SKIN: ICD-10-CM

## 2024-08-29 DIAGNOSIS — N95.9 MENOPAUSAL PROBLEM: ICD-10-CM

## 2024-08-29 DIAGNOSIS — G60.9 HEREDITARY AND IDIOPATHIC NEUROPATHY, UNSPECIFIED: ICD-10-CM

## 2024-08-29 DIAGNOSIS — I70.0 ATHEROSCLEROSIS OF AORTA: ICD-10-CM

## 2024-08-29 DIAGNOSIS — M47.26 OSTEOARTHRITIS OF SPINE WITH RADICULOPATHY, LUMBAR REGION: ICD-10-CM

## 2024-08-29 DIAGNOSIS — E78.5 HYPERLIPIDEMIA, UNSPECIFIED HYPERLIPIDEMIA TYPE: ICD-10-CM

## 2024-08-29 PROCEDURE — 99999 PR PBB SHADOW E&M-EST. PATIENT-LVL IV: CPT | Mod: PBBFAC,,, | Performed by: INTERNAL MEDICINE

## 2024-08-29 PROCEDURE — 99214 OFFICE O/P EST MOD 30 MIN: CPT | Mod: S$PBB,,, | Performed by: INTERNAL MEDICINE

## 2024-08-29 PROCEDURE — 99214 OFFICE O/P EST MOD 30 MIN: CPT | Mod: PBBFAC | Performed by: INTERNAL MEDICINE

## 2024-08-29 NOTE — PROGRESS NOTES
"Subjective:   Patient ID: Marly Maciel is a 78 y.o. female  Chief complaint:   Chief Complaint   Patient presents with    Medication Management     Medication monitoring f/u       HPI  Here for 6 month follow up of htn:    Bilateral foot toe numbness   Has been chronic abx for several years for MAC  back pain at times if does too much - does not radiate    Due for labs     HTN:  Taking Bb and hctz   Home bp at goal   Previously:   -  Not needed ekg as not on clofazamine at this time - if resume will let me know and will reorder ekg at that time to check q 1-3 months moving forward    Osteopenia:   - Dexa 7/2022 - due   - walking     MAC:   Just had visit to [place] with specialist and can stop all meds in October!  Previously:   Will need labs every 4 weeks for monitoring while on meds - cbc and bmp   Good news and off of 2 of 5 abx and to come back in 6 months and area on left lower lobe has imprved  PREVIOUSLY:   Hx of pulm mac, s/p lobectomy in [place]:   - sob - increased - seen by pulm as above - pfts excellent   + cultures again   - now taking azithro, ethambutol, tedizolid, omadacycline, amikacin inhaler   - needs cbc and cmp monthly - will order and follow   - now followed by Dr. Petty at [facility] as Dr. 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 Rosales is retiring and now to be folloewd by Dr. Gerardo Mari   "Treatment through [facility].  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 Abscesssus. Being seen at [facility] 3mo. On omadacylcine plus tidezolid daily.  Now on arikayce three times per week because of courhg. This improved with three times per week regimen.  Seen by Son 2 weeks ago. Told sputum culture neg since 1/23. CT scan 2 weeks ago showed possible hydropneumothorax. Not clear that her pulmonologist agreed with this. Has no pumonary symptoms. Cough is better. Brings up 1 tabsp daily. No blood. No DOWNEY. Notes sob with bending over.   No fever, chills, nite sweats. No wt loss  Had recent esoph mannometry study wich she needs to review with GI. No DAVID symptoms"    Aortic ectasia - ascending aorta:  CT 8/2024 with ascending aorta 3.9cm and stable   - sunshine statin   Previously:    - had CT 2/2024 and 11/2023 - latter reported ascending aorta uln 3.9cm  - CT 7/2018 with aortic root 4cm and stable on 11/2018  - seen on CT report 7/2019 from [place] and reviewed last few CT scans - no progression reported on follow CT scan 2022 and  8/2023  - bp controlled   - sunshine statin     Atherosclerosis, HLD:   sunshine statin      Hypothyroidism, hx of hashimotos and hyperparathyroidism dx 2/2 hyperCa diagnosed and treated with surgery 4/8/2014 at [facility] and s/p parathyroidectomy and partial thyroidectomy:  - tsh wnl prev - due for tfts   - not requiring levothyroxine at this time     ### not addressed today #####    DOWNEY/SOB:   Seen by Dr. Shultz 9//7/2023 for downey/sob:  - did have echo and stress echo as ordered and wnl per her     Hearing loss:   ENT Dr. Denson 8/9/2023   - started with hearing aids at [facility]    Dysphagia: stable   Did see GI - Dr. Hamilton   Had mbss:   No tracheal aspiration witnessed with any consistency.  Prominent cricopharyngeus  - see speech pathology note for full details.  - EGD at end June      Mole:   Had bx and benign per pt;  Prev:   Had flat mole left " "suprapubic region   No drainage   Not itchy   Just noticed mole - fairly new   Derm: Wahl     Macular degeneration:   - followed by Dr. Nascimento   - on eye supplement      Cervical DJD:   - improved - no longer on celebrex - using gooseneck book stand   Previously:   Attended PT   Followed by ortho - seen by ortho after PT at [facility]  Had CT scan of neck and went to receive injection - not improved and managing at this time    #####    HM:  Shingrix   Rsv   flu  Mmg - repeat in 6 months whic is now and now scheduled for next week   Dexa     Review of Systems    Objective:  Vitals:    08/29/24 0853   BP: 112/76   BP Location: Left arm   Patient Position: Sitting   Pulse: (!) 57   SpO2: 98%   Weight: 60.5 kg (133 lb 6.1 oz)   Height: 5' 5" (1.651 m)       Body mass index is 22.2 kg/m².    Physical Exam  Vitals reviewed.   Constitutional:       Appearance: Normal appearance. She is well-developed.   HENT:      Head: Normocephalic and atraumatic.   Eyes:      Extraocular Movements: Extraocular movements intact.      Conjunctiva/sclera: Conjunctivae normal.   Neck:      Thyroid: No thyromegaly.   Cardiovascular:      Rate and Rhythm: Normal rate and regular rhythm.      Pulses: Normal pulses.      Heart sounds: Normal heart sounds.   Pulmonary:      Effort: Pulmonary effort is normal. No respiratory distress.      Breath sounds: Normal breath sounds. No stridor. No wheezing or rales.   Abdominal:      General: Bowel sounds are normal.      Palpations: Abdomen is soft.   Musculoskeletal:         General: No swelling or tenderness.      Cervical back: Neck supple.   Lymphadenopathy:      Cervical: No cervical adenopathy.   Skin:     General: Skin is warm and dry.      Capillary Refill: Capillary refill takes less than 2 seconds.   Neurological:      General: No focal deficit present.      Mental Status: She is alert and oriented to person, place, and time. Mental status is at baseline.   Psychiatric:         Behavior: " Behavior normal.         Thought Content: Thought content normal.       Assessment:  1. Neuropathy    2. Vitamin D deficiency disease    3. Hashimoto's thyroiditis    4. Hypertension, unspecified type    5. Hyperlipidemia, unspecified hyperlipidemia type    6. Hereditary and idiopathic neuropathy, unspecified    7. Paresthesia of skin    8. Menopausal problem    9. Osteoarthritis of spine with radiculopathy, lumbar region      Neuropathy  -     Vitamin B12; Future; Expected date: 08/29/2024  -     Protein Electrophoresis, Serum; Future; Expected date: 08/29/2024  -     Vitamin B1; Future; Expected date: 08/29/2024  -     Vitamin B2; Future; Expected date: 08/29/2024  -     Vitamin B6; Future; Expected date: 08/29/2024  -     Heavy Metals Screen, Blood (Quantitative); Future; Expected date: 08/29/2024  -     Immunofixation Electrophoresis; Future; Expected date: 08/29/2024  -     GBAY; Future; Expected date: 08/29/2024  -     Treponema Pallidium Antibodies IgG, IgM; Future; Expected date: 08/29/2024  -     EMG W/ ULTRASOUND AND NERVE CONDUCTION TEST 2 Extremities; Future  Check labs and emg   Monitor     Vitamin D deficiency disease  -     Vitamin D; Future; Expected date: 11/29/2024  Stable   Cont suppl     Hashimoto's thyroiditis  -     TSH; Future; Expected date: 08/29/2024  Stable   Update tfts and monitor     Hypertension, unspecified type  -     CBC Auto Differential; Future; Expected date: 08/29/2024  -     Comprehensive Metabolic Panel; Future; Expected date: 08/29/2024  Stable   Con tregimen     Hyperlipidemia, unspecified hyperlipidemia type  -     Comprehensive Metabolic Panel; Future; Expected date: 08/29/2024  -     Lipid Panel; Future; Expected date: 08/29/2024  Stable   Con tstatin     Hereditary and idiopathic neuropathy, unspecified  -     Vitamin B6; Future; Expected date: 08/29/2024    Paresthesia of skin  -     Vitamin B12; Future; Expected date: 08/29/2024  -     EMG W/ ULTRASOUND AND NERVE  CONDUCTION TEST 2 Extremities; Future    Menopausal problem  -     DXA Bone Density Axial Skeleton 1 or more sites; Future; Expected date: 08/29/2024  Update dexa   Cont exerciese     Osteoarthritis of spine with radiculopathy, lumbar region  -     X-Ray Lumbar Complete Including Flex And Ext; Future; Expected date: 08/29/2024    Mycobacterium avium complex  Stable   F/u with pulm     Osteopenia, unspecified location  As above     Atherosclerosis of aorta  Stable   Cont statin     Aortic ectasia  Stable on cts - cont to monitor   Cont statin   - monitoring as having reg CT chest  - cont monitoring and will refer to specialist if increases - cont bp and statin mgmt     Health Maintenance   Topic Date Due    Shingles Vaccine (1 of 2) Never done    Mammogram  03/12/2025    DEXA Scan  07/06/2025    TETANUS VACCINE  06/14/2026    Colonoscopy  09/04/2028    Lipid Panel  10/05/2028    Hepatitis C Screening  Completed

## 2024-08-31 PROBLEM — I77.819 AORTIC ECTASIA: Status: ACTIVE | Noted: 2024-08-31

## 2024-09-04 ENCOUNTER — HOSPITAL ENCOUNTER (OUTPATIENT)
Dept: RADIOLOGY | Facility: OTHER | Age: 79
Discharge: HOME | End: 2024-09-04
Attending: INTERNAL MEDICINE
Payer: MEDICARE

## 2024-09-04 DIAGNOSIS — N95.9 MENOPAUSAL PROBLEM: ICD-10-CM

## 2024-09-04 PROCEDURE — 77080 DXA BONE DENSITY AXIAL: CPT | Mod: 26,,, | Performed by: RADIOLOGY

## 2024-09-04 PROCEDURE — 77080 DXA BONE DENSITY AXIAL: CPT | Mod: TC

## 2024-09-05 ENCOUNTER — HOSPITAL ENCOUNTER (OUTPATIENT)
Dept: RADIOLOGY | Facility: HOSPITAL | Age: 79
Discharge: HOME | End: 2024-09-05
Attending: INTERNAL MEDICINE
Payer: MEDICARE

## 2024-09-05 DIAGNOSIS — M47.26 OSTEOARTHRITIS OF SPINE WITH RADICULOPATHY, LUMBAR REGION: ICD-10-CM

## 2024-09-05 PROCEDURE — 72114 X-RAY EXAM L-S SPINE BENDING: CPT | Mod: 26,,, | Performed by: RADIOLOGY

## 2024-09-05 PROCEDURE — 72114 X-RAY EXAM L-S SPINE BENDING: CPT | Mod: TC,PN

## 2024-09-06 ENCOUNTER — LAB VISIT (OUTPATIENT)
Dept: LAB | Facility: HOSPITAL | Age: 79
End: 2024-09-06
Attending: INTERNAL MEDICINE
Payer: MEDICARE

## 2024-09-06 DIAGNOSIS — G60.9 HEREDITARY AND IDIOPATHIC NEUROPATHY, UNSPECIFIED: ICD-10-CM

## 2024-09-06 DIAGNOSIS — I10 HYPERTENSION, UNSPECIFIED TYPE: ICD-10-CM

## 2024-09-06 DIAGNOSIS — E55.9 VITAMIN D DEFICIENCY DISEASE: ICD-10-CM

## 2024-09-06 DIAGNOSIS — R20.2 PARESTHESIA OF SKIN: ICD-10-CM

## 2024-09-06 DIAGNOSIS — G62.9 NEUROPATHY: ICD-10-CM

## 2024-09-06 DIAGNOSIS — E06.3 HASHIMOTO'S THYROIDITIS: ICD-10-CM

## 2024-09-06 DIAGNOSIS — E78.5 HYPERLIPIDEMIA, UNSPECIFIED HYPERLIPIDEMIA TYPE: ICD-10-CM

## 2024-09-06 LAB
25(OH)D3+25(OH)D2 SERPL-MCNC: 44 NG/ML (ref 30–96)
ALBUMIN SERPL BCP-MCNC: 3.7 G/DL (ref 3.5–5.2)
ALP SERPL-CCNC: 65 U/L (ref 55–135)
ALT SERPL W/O P-5'-P-CCNC: 20 U/L (ref 10–44)
ANION GAP SERPL CALC-SCNC: 8 MMOL/L (ref 8–16)
AST SERPL-CCNC: 26 U/L (ref 10–40)
BASOPHILS # BLD AUTO: 0.05 K/UL (ref 0–0.2)
BASOPHILS NFR BLD: 1.2 % (ref 0–1.9)
BILIRUB SERPL-MCNC: 0.6 MG/DL (ref 0.1–1)
BUN SERPL-MCNC: 13 MG/DL (ref 8–23)
CALCIUM SERPL-MCNC: 9.6 MG/DL (ref 8.7–10.5)
CHLORIDE SERPL-SCNC: 106 MMOL/L (ref 95–110)
CHOLEST SERPL-MCNC: 155 MG/DL (ref 120–199)
CHOLEST/HDLC SERPL: 2.1 {RATIO} (ref 2–5)
CO2 SERPL-SCNC: 27 MMOL/L (ref 23–29)
CREAT SERPL-MCNC: 0.8 MG/DL (ref 0.5–1.4)
DIFFERENTIAL METHOD BLD: ABNORMAL
EOSINOPHIL # BLD AUTO: 0.2 K/UL (ref 0–0.5)
EOSINOPHIL NFR BLD: 4.3 % (ref 0–8)
ERYTHROCYTE [DISTWIDTH] IN BLOOD BY AUTOMATED COUNT: 12.6 % (ref 11.5–14.5)
EST. GFR  (NO RACE VARIABLE): >60 ML/MIN/1.73 M^2
GLUCOSE SERPL-MCNC: 86 MG/DL (ref 70–110)
HCT VFR BLD AUTO: 46 % (ref 37–48.5)
HDLC SERPL-MCNC: 74 MG/DL (ref 40–75)
HDLC SERPL: 47.7 % (ref 20–50)
HGB BLD-MCNC: 15 G/DL (ref 12–16)
IMM GRANULOCYTES # BLD AUTO: 0.01 K/UL (ref 0–0.04)
IMM GRANULOCYTES NFR BLD AUTO: 0.2 % (ref 0–0.5)
LDLC SERPL CALC-MCNC: 68.2 MG/DL (ref 63–159)
LYMPHOCYTES # BLD AUTO: 1 K/UL (ref 1–4.8)
LYMPHOCYTES NFR BLD: 24.7 % (ref 18–48)
MCH RBC QN AUTO: 31.9 PG (ref 27–31)
MCHC RBC AUTO-ENTMCNC: 32.6 G/DL (ref 32–36)
MCV RBC AUTO: 98 FL (ref 82–98)
MONOCYTES # BLD AUTO: 0.4 K/UL (ref 0.3–1)
MONOCYTES NFR BLD: 8.6 % (ref 4–15)
NEUTROPHILS # BLD AUTO: 2.6 K/UL (ref 1.8–7.7)
NEUTROPHILS NFR BLD: 61 % (ref 38–73)
NONHDLC SERPL-MCNC: 81 MG/DL
NRBC BLD-RTO: 0 /100 WBC
PLATELET # BLD AUTO: 181 K/UL (ref 150–450)
PMV BLD AUTO: 9.8 FL (ref 9.2–12.9)
POTASSIUM SERPL-SCNC: 4 MMOL/L (ref 3.5–5.1)
PROT SERPL-MCNC: 6.5 G/DL (ref 6–8.4)
RBC # BLD AUTO: 4.7 M/UL (ref 4–5.4)
SODIUM SERPL-SCNC: 141 MMOL/L (ref 136–145)
TREPONEMA PALLIDUM IGG+IGM AB [PRESENCE] IN SERUM OR PLASMA BY IMMUNOASSAY: NONREACTIVE
TRIGL SERPL-MCNC: 64 MG/DL (ref 30–150)
TSH SERPL DL<=0.005 MIU/L-ACNC: 2.35 UIU/ML (ref 0.4–4)
VIT B12 SERPL-MCNC: 714 PG/ML (ref 210–950)
WBC # BLD AUTO: 4.21 K/UL (ref 3.9–12.7)

## 2024-09-06 PROCEDURE — 82607 VITAMIN B-12: CPT | Performed by: INTERNAL MEDICINE

## 2024-09-06 PROCEDURE — 80061 LIPID PANEL: CPT | Performed by: INTERNAL MEDICINE

## 2024-09-06 PROCEDURE — 82306 VITAMIN D 25 HYDROXY: CPT | Performed by: INTERNAL MEDICINE

## 2024-09-06 PROCEDURE — 85025 COMPLETE CBC W/AUTO DIFF WBC: CPT | Performed by: INTERNAL MEDICINE

## 2024-09-06 PROCEDURE — 84165 PROTEIN E-PHORESIS SERUM: CPT | Mod: 26,,, | Performed by: PATHOLOGY

## 2024-09-06 PROCEDURE — 86593 SYPHILIS TEST NON-TREP QUANT: CPT | Performed by: INTERNAL MEDICINE

## 2024-09-06 PROCEDURE — 84165 PROTEIN E-PHORESIS SERUM: CPT | Performed by: INTERNAL MEDICINE

## 2024-09-06 PROCEDURE — 80053 COMPREHEN METABOLIC PANEL: CPT | Performed by: INTERNAL MEDICINE

## 2024-09-06 PROCEDURE — 86038 ANTINUCLEAR ANTIBODIES: CPT | Performed by: INTERNAL MEDICINE

## 2024-09-06 PROCEDURE — 86334 IMMUNOFIX E-PHORESIS SERUM: CPT | Mod: 26,,, | Performed by: PATHOLOGY

## 2024-09-06 PROCEDURE — 86334 IMMUNOFIX E-PHORESIS SERUM: CPT | Performed by: INTERNAL MEDICINE

## 2024-09-06 PROCEDURE — 36415 COLL VENOUS BLD VENIPUNCTURE: CPT | Mod: PN | Performed by: INTERNAL MEDICINE

## 2024-09-06 PROCEDURE — 84207 ASSAY OF VITAMIN B-6: CPT | Performed by: INTERNAL MEDICINE

## 2024-09-06 PROCEDURE — 84443 ASSAY THYROID STIM HORMONE: CPT | Performed by: INTERNAL MEDICINE

## 2024-09-06 PROCEDURE — 84425 ASSAY OF VITAMIN B-1: CPT | Performed by: INTERNAL MEDICINE

## 2024-09-07 LAB
ARSENIC BLD-MCNC: 2 NG/ML
CADMIUM BLD-MCNC: 0.5 NG/ML
CITY: NORMAL
COUNTY: NORMAL
GUARDIAN FIRST NAME: NORMAL
GUARDIAN LAST NAME: NORMAL
HOME PHONE: NORMAL
LEAD BLD-MCNC: 2.5 MCG/DL
MERCURY BLD-MCNC: 1 NG/ML
RACE: NORMAL
STATE: NORMAL
STREET ADDRESS: NORMAL
VENOUS/CAPILLARY: NORMAL
ZIP: NORMAL

## 2024-09-09 ENCOUNTER — PATIENT MESSAGE (OUTPATIENT)
Dept: INTERNAL MEDICINE | Facility: CLINIC | Age: 79
End: 2024-09-09
Payer: MEDICARE

## 2024-09-09 LAB
ALBUMIN SERPL ELPH-MCNC: 3.78 G/DL (ref 3.35–5.55)
ALPHA1 GLOB SERPL ELPH-MCNC: 0.25 G/DL (ref 0.17–0.41)
ALPHA2 GLOB SERPL ELPH-MCNC: 0.65 G/DL (ref 0.43–0.99)
ANA SER QL IF: NORMAL
B-GLOBULIN SERPL ELPH-MCNC: 0.71 G/DL (ref 0.5–1.1)
GAMMA GLOB SERPL ELPH-MCNC: 0.91 G/DL (ref 0.67–1.58)
INTERPRETATION SERPL IFE-IMP: NORMAL
PATHOLOGIST INTERPRETATION IFE: NORMAL
PATHOLOGIST INTERPRETATION SPE: NORMAL
PROT SERPL-MCNC: 6.3 G/DL (ref 6–8.4)

## 2024-09-10 LAB — VIT B2 SERPL-MCNC: 3 MCG/L (ref 1–19)

## 2024-09-10 RX ORDER — ALENDRONATE SODIUM 70 MG/1
70 TABLET ORAL
Qty: 12 TABLET | Refills: 2 | Status: SHIPPED | OUTPATIENT
Start: 2024-09-10

## 2024-09-10 NOTE — TELEPHONE ENCOUNTER
No care due was identified.  [facility] Embedded Care Due Messages. Reference number: [id number].   9/10/2024 6:54:53 AM CDT

## 2024-09-11 LAB — PYRIDOXAL SERPL-MCNC: 6 UG/L (ref 5–50)

## 2024-09-13 LAB — VIT B1 BLD-MCNC: 37 UG/L (ref 38–122)

## 2024-10-08 DIAGNOSIS — E78.5 HYPERLIPIDEMIA, UNSPECIFIED HYPERLIPIDEMIA TYPE: ICD-10-CM

## 2024-10-08 RX ORDER — ATORVASTATIN CALCIUM 10 MG/1
TABLET, FILM COATED ORAL
Qty: 90 TABLET | Refills: 3 | Status: SHIPPED | OUTPATIENT
Start: 2024-10-08

## 2024-10-08 NOTE — TELEPHONE ENCOUNTER
Care Due:                  Date            Visit Type   Department     Provider  --------------------------------------------------------------------------------                                EP -                              PRIMARY      [facility] INTERNAL  Last Visit: 08-      CARE ([facility])   MEDICINE       Stacy Pereira                              EP -                              PRIMARY      [facility] INTERNAL  Next Visit: 12-      CARE ([facility])   MEDICINE       Stacy Pereira                                                            Last  Test          Frequency    Reason                     Performed    Due Date  --------------------------------------------------------------------------------    Phosphate...  12 months..  alendronate..............  Not Found    Overdue    [employer] Embedded Care Due Messages. Reference number: [id number].   10/08/2024 1:43:30 PM CDT

## 2024-10-09 NOTE — TELEPHONE ENCOUNTER
Refill Decision Note   Marly Maciel  is requesting a refill authorization.  Brief Assessment and Rationale for Refill:  Approve     Medication Therapy Plan:         Comments:     Note composed:10:26 PM 10/08/2024

## 2024-10-29 DIAGNOSIS — Z00.00 ENCOUNTER FOR MEDICARE ANNUAL WELLNESS EXAM: ICD-10-CM

## 2024-11-18 ENCOUNTER — PATIENT MESSAGE (OUTPATIENT)
Dept: INTERNAL MEDICINE | Facility: CLINIC | Age: 79
End: 2024-11-18
Payer: MEDICARE

## 2024-11-18 DIAGNOSIS — G62.9 NEUROPATHY: Primary | ICD-10-CM

## 2024-11-18 RX ORDER — CYCLOBENZAPRINE HCL 10 MG
10 TABLET ORAL 3 TIMES DAILY PRN
Qty: 30 TABLET | Refills: 3 | Status: SHIPPED | OUTPATIENT
Start: 2024-11-18 | End: 2024-11-28

## 2024-11-18 NOTE — TELEPHONE ENCOUNTER
No care due was identified.  [facility] Embedded Care Due Messages. Reference number: [id number].   11/18/2024 3:49:09 PM CST

## 2024-12-03 ENCOUNTER — OFFICE VISIT (OUTPATIENT)
Dept: INTERNAL MEDICINE | Facility: CLINIC | Age: 79
End: 2024-12-03
Attending: INTERNAL MEDICINE
Payer: MEDICARE

## 2024-12-03 VITALS
OXYGEN SATURATION: 98 % | HEART RATE: 78 BPM | HEIGHT: 65 IN | BODY MASS INDEX: 22.2 KG/M2 | SYSTOLIC BLOOD PRESSURE: 122 MMHG | DIASTOLIC BLOOD PRESSURE: 84 MMHG

## 2024-12-03 DIAGNOSIS — Z90.2 STATUS POST PARTIAL LOBECTOMY OF LUNG: ICD-10-CM

## 2024-12-03 DIAGNOSIS — Z91.89 AT HIGH RISK FOR FRACTURE: ICD-10-CM

## 2024-12-03 DIAGNOSIS — E51.9 VITAMIN B1 DEFICIENCY: ICD-10-CM

## 2024-12-03 DIAGNOSIS — E53.8 B12 DEFICIENCY: Primary | ICD-10-CM

## 2024-12-03 DIAGNOSIS — M85.80 OSTEOPENIA, UNSPECIFIED LOCATION: ICD-10-CM

## 2024-12-03 DIAGNOSIS — I77.819 AORTIC ECTASIA: ICD-10-CM

## 2024-12-03 DIAGNOSIS — A31.0 MYCOBACTERIUM AVIUM COMPLEX: ICD-10-CM

## 2024-12-03 DIAGNOSIS — M85.80 OSTEOPENIA WITH HIGH RISK OF FRACTURE: ICD-10-CM

## 2024-12-03 DIAGNOSIS — I10 HYPERTENSION, UNSPECIFIED TYPE: ICD-10-CM

## 2024-12-03 DIAGNOSIS — E55.9 VITAMIN D DEFICIENCY DISEASE: ICD-10-CM

## 2024-12-03 DIAGNOSIS — Z86.39 HISTORY OF HYPERPARATHYROIDISM: ICD-10-CM

## 2024-12-03 PROCEDURE — 99213 OFFICE O/P EST LOW 20 MIN: CPT | Mod: PBBFAC | Performed by: INTERNAL MEDICINE

## 2024-12-03 PROCEDURE — 99999 PR PBB SHADOW E&M-EST. PATIENT-LVL III: CPT | Mod: PBBFAC,,, | Performed by: INTERNAL MEDICINE

## 2024-12-03 PROCEDURE — G2211 COMPLEX E/M VISIT ADD ON: HCPCS | Mod: S$PBB,,, | Performed by: INTERNAL MEDICINE

## 2024-12-03 PROCEDURE — 99215 OFFICE O/P EST HI 40 MIN: CPT | Mod: S$PBB,,, | Performed by: INTERNAL MEDICINE

## 2024-12-03 NOTE — PROGRESS NOTES
Subjective:   Patient ID: Marly Maciel is a 79 y.o. female  Chief complaint:   Chief Complaint   Patient presents with    Hypertension     F/u    Cough     Started Sunday, had fever Monday. Feels totally fine now.     HPI  Here for 3 month follow up of htn:  Just returned from [place] and saw pulm there and did well     Ate turkey sandwich and cheetos yesterday and around 2p and started coughing and tried nebulizer tx which typically helps   - then had episode of chills and temp was 100.5 and covid test negative   Grandson and son had sinus congestion after spoke to daughter in law  Feels well today   Cough was nonexistent and this am feels raspy and post nasal drip     HTN:  Taking Bb and hctz   Home bp at goal   Previously:   -  Not needed ekg as not on clofazamine at this time - if resume will let me know and will reorder ekg at that time to check q 1-3 months moving forward    Bilateral foot toe numbness   Stable   Taking b1   Has been chronic abx for several years for MAC and sx suspect to be due to chronic med use - discussed role of emg and defers for now - if worsens will proceed  back pain at times if does too much - does not radiate    Osteopenia, high risk for hip fracture, hx of hyperPTH s/p parathyroidectomy:   - Dexa 9/2024 utd - reviewed - did not start fosamax - concerned about pot SE and will f/u with endo for further discussion of options   - cont with vit d and calcium    - walking     MAC:   Stable as below  Prev:   Just had visit to [place] with specialist and can stop all meds in October!  Previously:   Will need labs every 4 weeks for monitoring while on meds - cbc and bmp   Good news and off of 2 of 5 abx and to come back in 6 months and area on left lower lobe has imprved  PREVIOUSLY:   Hx of pulm mac, s/p lobectomy in [place]:   - sob - increased - seen by pulm as above - pfts excellent   + cultures again   - now taking azithro, ethambutol, tedizolid, omadacycline, amikacin  "inhaler   - needs cbc and cmp monthly - will order and follow   - now followed by Dr. Petty at [facility] as Dr. Taylor retired   - [place] [facility] [place]  - fungal cx 8/10/2023 + aspergillus fumigatus   - AFB:  Mycobacterium intracellulare ssp chimaera  Previously:   - Reports that just found out that sputum cultures are now positive   - Mycobacterium chimaera identified by Line Probe Analysis from 5/2/2023 results:   Has appt in August with specialist   - Tx for pneumonia and treated and cleared and then cough worsened and then inh amik dec to 3x/week:   - [place] [facility] Rosales is retiring and now to be folloewd by Dr. Gerardo Mari   "Treatment through [facility].  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 Being seen at [facility] q 3mo. On omadacylcine plus tidezolid daily.  Now on arikayce three times per week because of courhg. This improved with three times per week regimen.  Seen by Son 2 weeks ago. Told sputum culture neg since 1/23. CT scan 2 weeks ago showed possible hydropneumothorax. Not clear that her pulmonologist agreed with this. Has no pumonary symptoms. Cough is better. Brings up 1 tabsp daily. No blood. No [place]. Notes sob with bending over.   No fever, chills, nite sweats. No wt loss  Had recent esoph mannometry study wich she needs to review with GI. No DAVID symptoms"    Aortic ectasia - ascending aorta:  CT 8/2024 with ascending aorta 3.9cm and stable   - sunshine statin   Previously:    - had CT 2/2024 and 11/2023 - latter reported ascending aorta uln 3.9cm  - CT 7/2018 with aortic root 4cm and stable on 11/2018  - seen on CT report 7/2019 from [place] and reviewed last few CT scans - no progression reported on follow CT scan 2022 and  8/2023  - bp controlled   - sunshine statin " "    Atherosclerosis, HLD:   sunshine statin      Hypothyroidism, hx of hashimotos and hyperparathyroidism dx 2/2 hyperCa diagnosed and treated with surgery 4/8/2014 at [facility] and s/p parathyroidectomy and partial thyroidectomy:  - tsh wnl prev - due for tfts   - not requiring levothyroxine at this time     ### not addressed today #####    DOWNEY/SOB:   Seen by Dr. Shultz 9//7/2023 for downey/sob:  - did have echo and stress echo as ordered and wnl per her     Hearing loss:   ENT Dr. Denson 8/9/2023   - started with hearing aids prn    Dysphagia: stable   Did see GI - Dr. Hamilton   Had mbss:   No tracheal aspiration witnessed with any consistency.  Prominent cricopharyngeus  - see speech pathology note for full details.  - EGD at end June      Mole:   Had bx and benign per pt;  Prev:   Had flat mole left suprapubic region   No drainage   Not itchy   Just noticed mole - fairly new   Derm: Wahl     Macular degeneration:   - followed by Dr. Nascimento   - on eye supplement      Cervical DJD:   - improved - no longer on celebrex - using [employer] book stand   Previously:   Attended PT   Followed by ortho - seen by ortho after PT at [facility]  Had CT scan of neck and went to receive injection - not improved and managing at this time    #####    HM:  Shingrix   Rsv   Mmg utd 9/2024  Dexa utd 9/2024    Review of Systems    Objective:  Vitals:    12/03/24 0924   BP: 122/84   BP Location: Left arm   Patient Position: Sitting   Pulse: 78   SpO2: 98%   Height: 5' 5" (1.651 m)         Body mass index is 22.2 kg/m².    Physical Exam  Vitals reviewed.   Constitutional:       Appearance: Normal appearance. She is well-developed.   HENT:      Head: Normocephalic and atraumatic.      Right Ear: Tympanic membrane, ear canal and external ear normal.      Left Ear: Tympanic membrane, ear canal and external ear normal.      Nose: Nose normal. No congestion.      Mouth/Throat:      Mouth: Mucous membranes are moist.      Pharynx: " Oropharynx is clear. No oropharyngeal exudate.   Eyes:      Extraocular Movements: Extraocular movements intact.      Conjunctiva/sclera: Conjunctivae normal.   Neck:      Thyroid: No thyromegaly.   Cardiovascular:      Rate and Rhythm: Normal rate and regular rhythm.      Pulses: Normal pulses.      Heart sounds: Normal heart sounds.   Pulmonary:      Effort: Pulmonary effort is normal. No respiratory distress.      Breath sounds: Normal breath sounds. No stridor. No wheezing or rales.   Abdominal:      General: Bowel sounds are normal.      Palpations: Abdomen is soft.   Musculoskeletal:         General: No swelling or tenderness.      Cervical back: Neck supple.   Lymphadenopathy:      Cervical: No cervical adenopathy.   Skin:     General: Skin is warm and dry.      Capillary Refill: Capillary refill takes less than 2 seconds.   Neurological:      General: No focal deficit present.      Mental Status: She is alert and oriented to person, place, and time. Mental status is at baseline.   Psychiatric:         Behavior: Behavior normal.         Thought Content: Thought content normal.       Assessment:  1. B12 deficiency    2. Vitamin B1 deficiency    3. Osteopenia with high risk of fracture    4. Osteopenia, unspecified location    5. Vitamin D deficiency disease    6. Status post partial lobectomy of lung    7. Mycobacterium avium complex    8. Hypertension, unspecified type    9. Aortic ectasia    10. At high risk for fracture    11. History of hyperparathyroidism      Marly was seen today for hypertension and cough.    Diagnoses and all orders for this visit:    Vitamin B1 deficiency  -     Vitamin B1; Future  Cont suppl   Check in 1 year when insurance allows repeat lab     Osteopenia with high risk of fracture  -     Ambulatory referral/consult to Endocrinology; Future  -     PTH, intact; Future  -     PHOSPHORUS; Future    Osteopenia, unspecified location  Cont tyson and vit d   Will f/u with endo to discuss  other meds beyond bisphosph to determine next steps   She defers fosamax for now - never started    Vitamin D deficiency disease  Stable   Cont suppl     Status post partial lobectomy of lung  Stable f/u with pulm     Mycobacterium avium complex  Stable f/u iwht pulm     Hypertension, unspecified type  Stable   Cont regimen     Aortic ectasia  Stable   Cont statin   Monitored by ct chest - stable     At high risk for fracture  -     PTH, intact; Future  -     PHOSPHORUS; Future    History of hyperparathyroidism  -     PTH, intact; Future  -     PHOSPHORUS; Future    Episode of fever - covid test neg - feeling well today - monitor sx   Supportive care   Rtc prompts reviewed     41 min spent in care of patient including chart review, history, orders, physical, orders and coordination of care  Visit today included increased complexity associated with the care of the episodic problem fever, HTN, osteopenia  addressed and managing the longitudinal care of the patient due to the serious and/or complex managed problem(s) MAC, b1 def.    Health Maintenance   Topic Date Due    Shingles Vaccine (1 of 2) Never done    RSV Vaccine (Age 60+ and Pregnant patients) (1 - 1-dose 75+ series) Never done    Mammogram  09/10/2025    TETANUS VACCINE  06/14/2026    DEXA Scan  09/04/2027    Colonoscopy  09/04/2028    Lipid Panel  09/06/2029    Hepatitis C Screening  Completed    Influenza Vaccine  Completed    COVID-19 Vaccine  Completed    Pneumococcal Vaccines (Age 65+)  Completed

## 2024-12-09 ENCOUNTER — PATIENT MESSAGE (OUTPATIENT)
Dept: INTERNAL MEDICINE | Facility: CLINIC | Age: 79
End: 2024-12-09
Payer: MEDICARE

## 2024-12-09 ENCOUNTER — OFFICE VISIT (OUTPATIENT)
Dept: URGENT CARE | Facility: CLINIC | Age: 79
End: 2024-12-09
Payer: MEDICARE

## 2024-12-09 VITALS
DIASTOLIC BLOOD PRESSURE: 79 MMHG | TEMPERATURE: 98 F | BODY MASS INDEX: 21.51 KG/M2 | WEIGHT: 126 LBS | SYSTOLIC BLOOD PRESSURE: 116 MMHG | HEIGHT: 64 IN | OXYGEN SATURATION: 98 % | RESPIRATION RATE: 18 BRPM | HEART RATE: 88 BPM

## 2024-12-09 DIAGNOSIS — J06.9 URI WITH COUGH AND CONGESTION: Primary | ICD-10-CM

## 2024-12-09 LAB
CTP QC/QA: YES
CTP QC/QA: YES
POC MOLECULAR INFLUENZA A AGN: NEGATIVE
POC MOLECULAR INFLUENZA B AGN: NEGATIVE
POC RSV RAPID ANT MOLECULAR: NEGATIVE

## 2024-12-09 PROCEDURE — 87634 RSV DNA/RNA AMP PROBE: CPT | Mod: QW,S$GLB,, | Performed by: NURSE PRACTITIONER

## 2024-12-09 PROCEDURE — 87502 INFLUENZA DNA AMP PROBE: CPT | Mod: QW,S$GLB,, | Performed by: NURSE PRACTITIONER

## 2024-12-09 PROCEDURE — 99213 OFFICE O/P EST LOW 20 MIN: CPT | Mod: S$GLB,,, | Performed by: NURSE PRACTITIONER

## 2024-12-09 RX ORDER — PROMETHAZINE HYDROCHLORIDE AND DEXTROMETHORPHAN HYDROBROMIDE 6.25; 15 MG/5ML; MG/5ML
5 SYRUP ORAL NIGHTLY PRN
Qty: 118 ML | Refills: 0 | Status: SHIPPED | OUTPATIENT
Start: 2024-12-09

## 2024-12-09 RX ORDER — BENZONATATE 100 MG/1
100 CAPSULE ORAL 3 TIMES DAILY PRN
Qty: 30 CAPSULE | Refills: 0 | Status: SHIPPED | OUTPATIENT
Start: 2024-12-09 | End: 2024-12-19

## 2024-12-09 NOTE — PATIENT INSTRUCTIONS
PLEASE READ YOUR DISCHARGE INSTRUCTIONS ENTIRELY AS IT CONTAINS IMPORTANT INFORMATION.      Please drink plenty of fluids.    Do not drive while taking the cough syrup - best to take it at night before going to sleep. However, you can take it during the day (every 4-6 hours) if you do not have to drive or operate machinery. This medication will make you drowsy. Try taking half a dose first to see how it affects you.      Please get plenty of rest.    Please return here or go to the Emergency Department for any concerns or worsening of condition.    Please take an over the counter antihistamine medication (allegra/Claritin/Zyrtec) of your choice as directed.    Try an over the counter decongestant like Mucinex D or Sudafed. You buy this behind the pharmacy counter    If you do have Hypertension or palpitations, it is safe to take Coricidin HBP for relief of sinus symptoms.    If not allergic, please take over the counter Tylenol (Acetaminophen) and/or Motrin (Ibuprofen) as directed for control of pain and/or fever.  Please follow up with your primary care doctor or specialist as needed.    Sore throat recommendations: Warm fluids, warm salt water gargles, throat lozenges, tea, honey, soup, rest, hydration.    Use over the counter flonase: one spray each nostril twice daily OR two sprays each nostril once daily.     If you  smoke, please stop smoking.      Please return or see your primary care doctor if you develop new or worsening symptoms.     Please arrange follow up with your primary medical clinic as soon as possible. You must understand that you've received an Urgent Care treatment only and that you may be released before all of your medical problems are known or treated. You, the patient, will arrange for follow up as instructed. If your symptoms worsen or fail to improve you should go to the Emergency Room.

## 2024-12-09 NOTE — PROGRESS NOTES
"Subjective:      Patient ID: Marly Maciel is a 79 y.o. female.    Vitals:  height is 5' 4" (1.626 m) and weight is 57.2 kg (126 lb). Her tympanic temperature is 97.9 °F (36.6 °C). Her blood pressure is 116/79 and her pulse is 88. Her respiration is 18 and oxygen saturation is 98%.     Chief Complaint: Cough    80 y/o female c/o with a cough, low grade fever, and running nose. Patient states she started cough  on Monday. Patient states she went to the dr on Tuesday and dr told her that if she states to feel bad after the appointment she need to follow up to get tested.  Provider note below:  This is a 79 y.o. female who presents today with a chief complaint of cough, postnasal drip, low-grade fever, sore throat started on Monday, patient reports she was seen by her primary on Tuesday and also reported low-grade fever T-max 100.5° at that time, patient reports she was advised to get tested if her symptom does not get better, patient reports she was on antibiotics for several years due to MAC, that was stopped in October, patient reports due to her chronic cough she used to take promethazine with codeine and tolerated well, patient reports she does follows up with the specialist in [place], and will contact them, patient requesting RSV and flu test, did COVID test at home 3 times negative, denies fever, body aches or chills, denies cough, wheezing or shortness of breath, denies nausea, vomiting, diarrhea or abdominal pain, denies chest pain or dizziness positional lightheadedness, denies sore throat or trouble swallowing, denies loss of taste or smell, or any other symptoms           Cough  This is a new problem. The current episode started in the past 7 days. The problem has been gradually worsening. The problem occurs constantly. Associated symptoms include a fever (low grade), headaches, postnasal drip and a sore throat. Pertinent negatives include no chest pain, chills, ear congestion, ear pain, heartburn, " hemoptysis, myalgias, nasal congestion, rash, rhinorrhea, shortness of breath, sweats, weight loss or wheezing. Nothing aggravates the symptoms. She has tried prescription cough suppressant for the symptoms. Her past medical history is significant for bronchiectasis. There is no history of asthma, bronchitis, COPD, emphysema, environmental allergies or pneumonia.       Constitution: Positive for fever (low grade). Negative for chills.   HENT:  Positive for postnasal drip and sore throat. Negative for ear pain.    Cardiovascular:  Negative for chest pain.   Respiratory:  Positive for cough. Negative for bloody sputum, shortness of breath and wheezing.    Gastrointestinal:  Negative for heartburn.   Musculoskeletal:  Negative for muscle ache.   Skin:  Negative for rash.   Allergic/Immunologic: Negative for environmental allergies.   Neurological:  Positive for headaches.      Past Medical History:   Diagnosis Date    Hashimoto's thyroiditis     Hyperlipidemia     Hypertension     Internal hemorrhoids     Osteopenia        Objective:     Physical Exam   Constitutional: She is oriented to person, place, and time. She appears well-developed. She is cooperative.  Non-toxic appearance. She does not appear ill. No distress.      Comments:Patient sitting comfortably on the exam table, non toxic appearance  and answering questions appropriately, no acute distress, talking in full sentences without pause        HENT:   Head: Normocephalic and atraumatic.   Ears:   Right Ear: Hearing, tympanic membrane, external ear and ear canal normal.   Left Ear: Hearing, tympanic membrane, external ear and ear canal normal.   Nose: Nose normal. No mucosal edema, rhinorrhea or nasal deformity. No epistaxis. Right sinus exhibits no maxillary sinus tenderness and no frontal sinus tenderness. Left sinus exhibits no maxillary sinus tenderness and no frontal sinus tenderness.   Mouth/Throat: Uvula is midline, oropharynx is clear and moist and  mucous membranes are normal. No trismus in the jaw. Normal dentition. No uvula swelling. No oropharyngeal exudate, posterior oropharyngeal edema or posterior oropharyngeal erythema.   Eyes: Conjunctivae and lids are normal. No scleral icterus.   Neck: Trachea normal and phonation normal. Neck supple. No edema present. No erythema present. No neck rigidity present.   Cardiovascular: Normal rate, regular rhythm, normal heart sounds and normal pulses.   Pulmonary/Chest: Effort normal and breath sounds normal. No stridor. No respiratory distress. She has no decreased breath sounds. She has no wheezes. She has no rhonchi. She has no rales.   Lungs CTA         Comments: Lungs CTA    Abdominal: Normal appearance.   Musculoskeletal: Normal range of motion.         General: No deformity. Normal range of motion.   Neurological: She is alert and oriented to person, place, and time. She exhibits normal muscle tone. Coordination normal.   Skin: Skin is warm, dry, intact, not diaphoretic and not pale.   Psychiatric: Her speech is normal and behavior is normal. Judgment and thought content normal.   Nursing note and vitals reviewed.    Results for orders placed or performed in visit on 12/09/24   POCT Influenza A/B MOLECULAR    Collection Time: 12/09/24  9:40 AM   Result Value Ref Range    POC Molecular Influenza A Ag Negative Negative    POC Molecular Influenza B Ag Negative Negative     Acceptable Yes    POCT RSV by Molecular    Collection Time: 12/09/24  9:40 AM   Result Value Ref Range    POC RSV Rapid Ant Molecular Negative Negative     Acceptable Yes          Patient in no acute distress.  Vitals reassuring.  Discussed results/diagnosis/plan in depth with patient in clinic. Strict precautions given to patient to monitor for worsening signs and symptoms. Advised to follow up with primary.All questions answered. Strict ER precautions given. If your symptoms worsens or fail to improve you should go  to the Emergency Room. Discharge and follow-up instructions given verbally/printed. Discharge and follow-up instructions discussed with the patient who expressed understanding and willingness to comply with my recommendations.Patient voiced understanding and in agreement with current treatment plan.     Please be advised this text was dictated with [employer] software and may contain errors due to translation.   Assessment:     1. URI with cough and congestion        Plan:       URI with cough and congestion  -     POCT Influenza A/B MOLECULAR  -     POCT RSV by Molecular    Other orders  -     benzonatate (TESSALON) 100 MG capsule; Take 1 capsule (100 mg total) by mouth 3 (three) times daily as needed for Cough.  Dispense: 30 capsule; Refill: 0  -     promethazine-dextromethorphan (PROMETHAZINE-DM) 6.25-15 mg/5 mL Syrp; Take 5 mLs by mouth nightly as needed (cough). maximum: 30 mL in 24 hours  Dispense: 118 mL; Refill: 0          Medical Decision Making:   Clinical Tests:   Lab Tests: Reviewed  Urgent Care Management:  Patient in no acute distress.  Vitals reassuring.  On exam, patient is nontoxic appearing and afebrile.  Lungs CTA.  Negative RSV and flu test results discussed with patient in detail.  Will prescribe cough medication with detailed education provided about side effects and recommendations.  Chest x-ray discussed, patient deferred at this time.  Advised patient to follow up with the specialist/primary for further evaluation if no improvement symptoms worsening symptoms.  Medication prescribed and over-the-counter medication discussed with patient at length.  Proper hydration advised.  I reiterated the importance of further evaluation if no improvement symptoms and follow-up with primary. Patient voiced understanding and in agreement with current treatment plan.           Patient Instructions   PLEASE READ YOUR DISCHARGE INSTRUCTIONS ENTIRELY AS IT CONTAINS IMPORTANT INFORMATION.      Please drink plenty  of fluids.    Please get plenty of rest.    Please return here or go to the Emergency Department for any concerns or worsening of condition.    Please take an over the counter antihistamine medication (allegra/Claritin/Zyrtec) of your choice as directed.    Try an over the counter decongestant like Mucinex D or Sudafed. You buy this behind the pharmacy counter    If you do have Hypertension or palpitations, it is safe to take Coricidin HBP for relief of sinus symptoms.    If not allergic, please take over the counter Tylenol (Acetaminophen) and/or Motrin (Ibuprofen) as directed for control of pain and/or fever.  Please follow up with your primary care doctor or specialist as needed.    Sore throat recommendations: Warm fluids, warm salt water gargles, throat lozenges, tea, honey, soup, rest, hydration.    Use over the counter flonase: one spray each nostril twice daily OR two sprays each nostril once daily.     If you  smoke, please stop smoking.      Please return or see your primary care doctor if you develop new or worsening symptoms.     Please arrange follow up with your primary medical clinic as soon as possible. You must understand that you've received an Urgent Care treatment only and that you may be released before all of your medical problems are known or treated. You, the patient, will arrange for follow up as instructed. If your symptoms worsen or fail to improve you should go to the Emergency Room.

## 2025-02-03 ENCOUNTER — PATIENT MESSAGE (OUTPATIENT)
Dept: INTERNAL MEDICINE | Facility: CLINIC | Age: 80
End: 2025-02-03
Payer: MEDICARE

## 2025-02-03 DIAGNOSIS — G62.9 PERIPHERAL POLYNEUROPATHY: Primary | ICD-10-CM

## 2025-02-03 DIAGNOSIS — R05.9 COUGH, UNSPECIFIED TYPE: ICD-10-CM

## 2025-02-03 RX ORDER — PROMETHAZINE HYDROCHLORIDE AND DEXTROMETHORPHAN HYDROBROMIDE 6.25; 15 MG/5ML; MG/5ML
5 SYRUP ORAL EVERY 8 HOURS PRN
Qty: 118 ML | Refills: 1 | Status: SHIPPED | OUTPATIENT
Start: 2025-02-03

## 2025-02-03 RX ORDER — PROMETHAZINE HYDROCHLORIDE AND CODEINE PHOSPHATE 6.25; 1 MG/5ML; MG/5ML
5 SOLUTION ORAL EVERY 4 HOURS PRN
Status: CANCELLED | OUTPATIENT
Start: 2025-02-03 | End: 2025-02-13

## 2025-02-03 RX ORDER — GABAPENTIN 300 MG/1
CAPSULE ORAL
Qty: 60 CAPSULE | Refills: 6 | Status: SHIPPED | OUTPATIENT
Start: 2025-02-03

## 2025-02-24 DIAGNOSIS — Z00.00 ENCOUNTER FOR MEDICARE ANNUAL WELLNESS EXAM: ICD-10-CM

## 2025-03-07 DIAGNOSIS — R00.2 PALPITATIONS: ICD-10-CM

## 2025-03-07 NOTE — TELEPHONE ENCOUNTER
Care Due:                  Date            Visit Type   Department     Provider  --------------------------------------------------------------------------------                                EP -                              PRIMARY      [facility]  Last Visit: 12-      CARE ([facility])   MEDICINE       Stacy Pereira                              EP -                              PRIMARY      [facility] INTER[facility]  Next Visit: 06-      CARE ([facility])   MEDICINE       Stacy Pereira                                                            Last  Test          Frequency    Reason                     Performed    Due Date  --------------------------------------------------------------------------------    Mg Level....  12 months..  alendronate..............  03- 02-    Phosphate...  12 months..  alendronate..............  Not Found    Overdue    [employer] Embedded Care Due Messages. Reference number: [id number].   3/07/2025 5:31:09 PM CST

## 2025-03-10 RX ORDER — METOPROLOL SUCCINATE 50 MG/1
50 TABLET, EXTENDED RELEASE ORAL DAILY
Qty: 90 TABLET | Refills: 2 | Status: SHIPPED | OUTPATIENT
Start: 2025-03-10

## 2025-03-10 NOTE — TELEPHONE ENCOUNTER
Refill Decision Note   Marly Maciel  is requesting a refill authorization.  Brief Assessment and Rationale for Refill:  Approve     Medication Therapy Plan:         Comments:     Note composed:11:28 AM 03/10/2025

## 2025-03-27 ENCOUNTER — OFFICE VISIT (OUTPATIENT)
Dept: NEUROLOGY | Facility: CLINIC | Age: 80
End: 2025-03-27
Attending: INTERNAL MEDICINE
Payer: MEDICARE

## 2025-03-27 VITALS
SYSTOLIC BLOOD PRESSURE: 128 MMHG | HEIGHT: 64 IN | HEART RATE: 71 BPM | BODY MASS INDEX: 21.17 KG/M2 | WEIGHT: 124 LBS | DIASTOLIC BLOOD PRESSURE: 84 MMHG

## 2025-03-27 DIAGNOSIS — T37.1X5A: Primary | ICD-10-CM

## 2025-03-27 DIAGNOSIS — G62.0: Primary | ICD-10-CM

## 2025-03-27 DIAGNOSIS — G60.9 IDIOPATHIC PERIPHERAL NEUROPATHY: ICD-10-CM

## 2025-03-27 DIAGNOSIS — R20.0 NUMBNESS IN FEET: ICD-10-CM

## 2025-03-27 PROCEDURE — 99999 PR PBB SHADOW E&M-EST. PATIENT-LVL III: CPT | Mod: PBBFAC,,, | Performed by: STUDENT IN AN ORGANIZED HEALTH CARE EDUCATION/TRAINING PROGRAM

## 2025-03-27 PROCEDURE — 99213 OFFICE O/P EST LOW 20 MIN: CPT | Mod: PBBFAC | Performed by: STUDENT IN AN ORGANIZED HEALTH CARE EDUCATION/TRAINING PROGRAM

## 2025-03-27 RX ORDER — GABAPENTIN 600 MG/1
TABLET ORAL
Qty: 30 TABLET | Refills: 11 | Status: SHIPPED | OUTPATIENT
Start: 2025-03-27

## 2025-03-27 NOTE — Clinical Note
Is Dr. Payton's list open for EMG? She would like to do her EMG here at [facility]

## 2025-03-27 NOTE — Clinical Note
Hi Dr. Pereira,  I switched her back to the tablet form since she is not tolerating the Gabapentin Cap. I wanted to repeat her B6 given the Hx of INH use and lower limit of normal in Sep but insurance blocked me. It would be great if you could repeat it with your annual work up.   Mark, Alysha

## 2025-03-27 NOTE — PROGRESS NOTES
Patient ID: [id number]  Referring Physician: Stacy Pereira MD    Chief Complaint/Reason for Consult: numbness tingling in feet  Subjective:     HPI  Marly Maciel is a very pleasant 79 y.o. RH female with HTN, hyperlipidemia, Hashimoto's thyroiditis, and Hx of prolonged pulmonary MAC. she is presenting today for evaluation of numbness and tingling in feet.     Symptoms started 1 year ago. Numbness, pins and needles, burning pain in toes during the day, worse at nights, involving feet and ankles.  She has burning sensation only at the tip of the little finger on the left otherwise hands are not involved.   Back pain is worse for the past few weeks, has to take Flexeril more frequently.    Relevant history:   Diabetes Mellitus: (--), most recent A1C 5  Thyroid dysfunction: (--), Hx of Hashimoto's with normal TSH  Bariatric surgery: (--)  Vegan/vegetarian: (--)  Celiac/Crohn's/UC:(--)  Substance use (vince. Nitrous oxide): (--), drinks 1 alcoholic drinks per week  Cervical/lumbar spondylosis: (+) lumbar and cervical spondylosis   History of chemotherapy or other neurotoxic agents: (+) long courses of oral and IV antibiotics including INH  Previous treatments: gabapentin 300 mg tablet (half of 600 mg) helped significantly and rapidly, the capsule form made her more jittery, overstimulated, and took longer to kick in      Review of Systems   Constitutional:  Negative for unexpected weight change.   Eyes:  Negative for visual disturbance.   Gastrointestinal:  Negative for constipation and fecal incontinence.   Genitourinary:  Negative for bladder incontinence and difficulty urinating.   Musculoskeletal:  Positive for back pain. Negative for arthralgias and gait problem.   Neurological:  Positive for numbness. Negative for facial asymmetry, speech difficulty and weakness.   Psychiatric/Behavioral:  Negative for agitation and confusion.    All other systems reviewed and are negative.        Past Medical  History:  -------------------------------------    Hashimoto's thyroiditis    Hyperlipidemia    Hypertension    Internal hemorrhoids    Osteopenia       Allergies:  Review of patient's allergies indicates:   Allergen Reactions    Adhesive Rash     Tape & bandaids  Tape & bandaids    Albuterol Other (See Comments)     faint    Ciprofloxacin Other (See Comments)     Central nervous system issues       Pertinent Family History:  Family History   Problem Relation Name Age of Onset    Stroke Mother Radha Corey 86    Heart attack Father Garett 67    Heart disease Father Garett        Pertinent Social History:  Social History[1]    Medications:  Current Outpatient Medications   Medication Instructions    alendronate (FOSAMAX) 70 mg, Oral, Every 7 days    atorvastatin (LIPITOR) 10 MG tablet TAKE 1 TABLET BY MOUTH EVERY DAY    calcium phosphate trib/vit D3 (CALTRATE GUMMY BITES ORAL) 2 tablets, Daily    celecoxib (CELEBREX) 200 mg, Daily PRN    gabapentin (NEURONTIN) 300 MG capsule Take 300 mg by mouth nightly for 1 week and then can take 300 mg by mouth twice daily    hydroCHLOROthiazide (HYDRODIURIL) 25 mg, Oral, Daily    metoprolol succinate (TOPROL-XL) 50 mg, Oral, Daily    mv-min/iron/folic/calcium/vitK (WOMEN'S MULTIVITAMIN ORAL) 1 tablet, Daily    promethazine-dextromethorphan (PROMETHAZINE-DM) 6.25-15 mg/5 mL Syrp 5 mLs, Oral, Every 8 hours PRN, maximum: 30 mL in 24 hours    sodium chloride 7% 7 % nebulizer solution 4 mLs, Nebulization, Daily        Objective:     Vitals:    03/27/25 1303   BP: 128/84   Pulse: 71        General:  Well-appearing, well-nourished, NAD, cooperative  MSK: no TTP on lumbar spine    Neurologic Exam:   Awake, alert and oriented x3  Speech spontaneous and fluent, intact comprehension.   Adequate fund of knowledge, vocabulary.    CN II - CN XII:  PERRLA. EOM intact. No Nystagmus. No ophthalmoplegia.   Facial sensation is normal to light touch.   Facial expression is full and symmetric.    Hearing is intact bilaterally.   Palate elevates symmetrically.   SCM and Trapezius full strength bilaterally.   Tongue is midline.     Motor:  Normal bulk and tone in all four limbs.   There are no atrophy or fasciculations. No tremor.     Shoulder  Abd Shoulder Add Elbow   Flex Elbow  Ext Wrist   Flex Wrist  Ext Finger  Flex Finger  Ext Finger  Abd Finger   Add IO Opposition   Right 5 5 5 5       5    Left 5 5 5 5       5       Hip  Flex Hip  Ext Thigh   Abd Thigh  Add Knee  Flex Knee  Ext Plantar  Flex Dorsiflex   Right 5 5   5 5 5 5   Left 5 5   5 5 5 5     Sensory:  Light touch: normal tactile sense throughout  Temperature: reduced on RLE and on LLE  Pinprick: reduced on RLE and on LLE  Vibration: vibratory sense reduced on RLE<LLE  Proprioception: proprioceptive sense present on RUE and on LUE  Romberg is impaired.    DTRs:   Biceps Brachioradialis Triceps Karen Patellar Ankle Plantar   Right 1+ 1+  - 1+  Down   Left 1+ 1+  - silent  Down     Coordination:  Finger to nose is normal bilaterally.  Normal fine finger movements and rapid alternating movements.    Gait:  Normal casual gait.      Pertinent lab results  Lab Results   Component Value Date    FOLATE 16.4 02/04/2020    HVRGHGCO49 714 09/06/2024    THIAMINEBLOO 37 (L) 09/06/2024    VITAMINB6 6 09/06/2024    VBMKISJO00PE 44 09/06/2024    LACTATE 1.8 06/03/2019     Lab Results   Component Value Date    ARSENICBLD 2 09/06/2024    LEADBLOOD 2.5 09/06/2024    CADMIUM 0.5 09/06/2024    MERCURYBLOOD 1 09/06/2024     Lab Results   Component Value Date    PATHINTPSPE REVIEWED 09/06/2024    PATHINTPSIF REVIEWED 09/06/2024     Lab Results   Component Value Date    ANASCREEN Negative <1:80 09/06/2024    ANTISSAANTIB 1.70 09/17/2014    RF <10.0 09/17/2014    SEDRATE 5 08/03/2018     Lab Results   Component Value Date    QWI85GTRG Negative 02/06/2015    HEPBSAG Negative 02/06/2015     Lab Results   Component Value Date    TSH 2.355 09/06/2024    FREET4 0.98  05/10/2023    WBC 4.21 09/06/2024    LYMPH 1.0 09/06/2024    LYMPH 24.7 09/06/2024    RBC 4.70 09/06/2024    HGB 15.0 09/06/2024    HCT 46.0 09/06/2024    MCV 98 09/06/2024     09/06/2024     09/06/2024    K 4.0 09/06/2024    CO2 27 09/06/2024    BUN 13 09/06/2024    CREATININE 0.8 09/06/2024    CALCIUM 9.6 09/06/2024    AST 26 09/06/2024    ALT 20 09/06/2024       Pertinent imaging results    *No images available to review in person for these studies, per radiology report:  12/15/2021  MRI Cervical Spine wo contrast:  Mild multilevel degenerative changes of the cervical spine.   Mild dextroconvex curvature of the cervical spine.     Other pertinent studies  None    Assessment:   Marly Maciel is a 79 y.o. RH female with HTN, hyperlipidemia, Hashimoto's thyroiditis, and Hx of prolonged pulmonary MAC treated with prolonged courses of antibiotics including INH presenting with neuropathic features in distal lower extremities and found to have deficits in several sensory modalities including vibration, pin prick, and temperature. This is most likely a drug-induced neuropathy in the setting of INH use however I'd recommend ruling out other contributing metabolic factors that haven't been checked. Also, EMG can help in determining if there is component of active lumbar radiculopathy given the asymmetry in some of the exam findings. We will switch to tablet form of gabapentin for better efficacy and less prominent side effects.     1. Isoniazid induced neuropathy    2. Idiopathic peripheral neuropathy    3. Numbness in feet       Plan:     - Recommend B6 supplementation since levels were on the lower limit of normal  - Zinc; Future  - Vitamin E; Future  - Copper, Serum; Future  - EMG W/ ULTRASOUND AND NERVE CONDUCTION TEST 2 Extremities; Future  - gabapentin (NEURONTIN) 600 MG tablet; Take 0.5 to 1 tablet every night  Dispense: 30 tablet; Refill: 11  - Follow up: VV in 6 months to reassess      Plan was  discussed in detail with the patient, who is in agreement.        Disclaimer: This note was partly generated using dictation software which may occasionally result in transcription errors that are missed on review.      Based on our encounter today, my overall Medical Decision Making is a Level 4     Complexity of Problem: Moderate (1 or more chronic illnesses with exacerbation, progression or treatment side effects)  Complexity of Data: Moderate (Review of >3 unique test results and Ordering >3 unique tests )  Risk of Complications and/or morbidity/mortality of Management: Moderate risk (Prescription drug management)          Alysha Tracy MD    [facility]  03/27/2025         [1]   Social History  Tobacco Use    Smoking status: Never    Smokeless tobacco: Never   Substance Use Topics    Alcohol use: Yes     Alcohol/week: 1.0 standard drink of alcohol     Types: 1 Glasses of wine per week     Comment: weekly    Drug use: Never

## 2025-03-28 ENCOUNTER — PATIENT MESSAGE (OUTPATIENT)
Dept: NEUROLOGY | Facility: CLINIC | Age: 80
End: 2025-03-28
Payer: MEDICARE

## 2025-03-31 ENCOUNTER — LAB VISIT (OUTPATIENT)
Dept: LAB | Facility: HOSPITAL | Age: 80
End: 2025-03-31
Attending: STUDENT IN AN ORGANIZED HEALTH CARE EDUCATION/TRAINING PROGRAM
Payer: MEDICARE

## 2025-03-31 DIAGNOSIS — G60.9 IDIOPATHIC PERIPHERAL NEUROPATHY: ICD-10-CM

## 2025-03-31 DIAGNOSIS — R20.0 NUMBNESS IN FEET: ICD-10-CM

## 2025-03-31 PROCEDURE — 84446 ASSAY OF VITAMIN E: CPT

## 2025-03-31 PROCEDURE — 84630 ASSAY OF ZINC: CPT

## 2025-03-31 PROCEDURE — 82525 ASSAY OF COPPER: CPT

## 2025-03-31 PROCEDURE — 36415 COLL VENOUS BLD VENIPUNCTURE: CPT | Mod: PN

## 2025-04-01 ENCOUNTER — PROCEDURE VISIT (OUTPATIENT)
Dept: NEUROLOGY | Facility: CLINIC | Age: 80
End: 2025-04-01
Payer: MEDICARE

## 2025-04-01 DIAGNOSIS — G60.9 IDIOPATHIC PERIPHERAL NEUROPATHY: ICD-10-CM

## 2025-04-01 DIAGNOSIS — R20.0 NUMBNESS IN FEET: ICD-10-CM

## 2025-04-01 PROCEDURE — 95912 NRV CNDJ TEST 11-12 STUDIES: CPT | Mod: PBBFAC,PN | Performed by: PHYSICAL MEDICINE & REHABILITATION

## 2025-04-01 PROCEDURE — 95912 NRV CNDJ TEST 11-12 STUDIES: CPT | Mod: 26,S$PBB,, | Performed by: PHYSICAL MEDICINE & REHABILITATION

## 2025-04-01 PROCEDURE — 95886 MUSC TEST DONE W/N TEST COMP: CPT | Mod: PBBFAC,PN | Performed by: PHYSICAL MEDICINE & REHABILITATION

## 2025-04-01 PROCEDURE — 95886 MUSC TEST DONE W/N TEST COMP: CPT | Mod: 26,S$PBB,, | Performed by: PHYSICAL MEDICINE & REHABILITATION

## 2025-04-01 NOTE — PROCEDURES
Test Date:  2025    Patient: marly maciel : 1945 Physician: Jordan Shipley D.O.   ID#: [id number] SEX: Female Ref. Phys: Alysha Tracy MD     HPI: Marly Maciel is a 79 y.o.female who presents for NCS/EMG to evaluate for polyneuropathy, mostly of feet, but also starting to get occasional L>R finger n/t.      PROCEDURE:  Prior to the procedure, the procedure was discussed in detail with the patient.  All questions were answered, and verbal consent was obtained.  For nerve conduction studies, a combination of surface electrodes, bar electrodes, and/or ring electrodes were used as needed.  For needle EMG, each site was cleaned and prepped in usual fashion with an alcohol pad.  A monopolar needle (28G) was used.  There was no significant bleeding, and bandages were applied as needed.  The procedure was tolerated without adverse effect.  The patient was instructed on post-procedure care including ice if needed for 10-15 minutes up to 4 times/day for any sore muscles.  I discussed with the patient that the data would be reviewed and a report sent to the referring provider, where any follow up questions regarding next steps should be directed.        NCV & EMG Findings:  Evaluation of the left Fibular (EDB) motor nerve showed reduced amplitude and decreased conduction velocity (Bel Fib Head-Ankle).  The right Fibular (EDB) motor, the left Superficial Fibular sensory, the left sural sensory, and the right sural sensory nerves showed reduced amplitude.  The right Superficial Fibular sensory nerve showed no response.  All remaining nerves (as indicated in the following tables) were within normal limits.  Needle evaluation of the right Tibialis Anterior and the right Fibularis Longus muscles showed increased motor unit amplitude.  All remaining muscles (as indicated in the following table) showed no evidence of electrical instability.      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  This is relatively symmetric, and there is a distal to proximal gradient (I.e. length dependent).    There were chronic neuropathic changes in the right tibialis anterior and peroneus longus muscles.  This could indicate a comorbid chronic right L5 radiculopathy or peroneal neuropathy, though is not strong evidence for either given the otherwise normal needle EMG and presence of the polyneuropathy described above.        ___________________________  Jordan Shipley D.O.        NCS+  Motor Nerve Results      Latency Amplitude F-Lat Segment Distance CV Comment   Site (ms) Norm (mV) Norm (ms)  (cm) (m/s) Norm    Left Median (APB)   Wrist 2.6  < 4.4 6.5  > 3.8         Elbow 7.0 - 6.0 -  Elbow-Wrist 24 55  > 51    Left Ulnar (ADM)   Wrist 2.5  < 3.7 6.3  > 3.0         Bel Elbow 6.1 - 6.7 -  Bel Elbow-Wrist 22 61  > 52    Abv Elbow 7.7 - 5.7 -  Abv Elbow-Bel Elbow 9 56  > 43    Left Fibular (EDB)   Ankle 5.1  < 6.5 *0.90  > 1.10         Bel Fib Head 14.4 - 0.83 -  Bel Fib Head-Ankle 35 *38  > 39    Pop Fossa 16.4 - 1.09 -  Pop Fossa-Bel Fib Head 9 45  > 42    Right Fibular (EDB)   Ankle 5.6  < 6.5 *0.76  > 1.10         Bel Fib Head 13.1 - 0.29 -  Bel Fib Head-Ankle 35 47  > 39    Pop Fossa 15.3 - 0.55 -  Pop Fossa-Bel Fib Head 10 45  > 42    Left Tibial (AHB)   Ankle 5.9  < 6.1 2.4  > 1.10         Right Tibial (AHB)   Ankle 5.4  < 6.1 3.1  > 1.10           Sensory Nerve Results      Start Lat Latency (Peak) Amplitude (P-P) Segment Distance Start CV Comment   Site (ms) Norm (ms) (µV) Norm  (cm) (m/s) Norm    Left Median (Rec:Dig II)   Wrist 2.6  < 3.3 3.4 21  > 15 Wrist-Dig II 14 54  > 42    Left Ulnar (Rec:Dig V)   Wrist 2.3  < 3.1 2.9 23  > 13 Wrist-Dig V 14 61  > 45    Left Sural (Rec:Lat Mall)   Calf 2.7  < 3.6 3.1 *3  > 4 Calf-Lat Mall 14 52  > 39    Right Sural (Rec:Lat Mall)   Calf 1.95  < 3.6 2.3 *1  > 4 Calf-Lat Mall 14 72  > 39     Left Superficial Fibular (Rec:Ankle)   14 cm 2.4 - 3.0 *4  > 5 14 cm-Ankle 14 58  > 32    Right Superficial Fibular (Rec:Ankle)   14 cm *NR - *NR *NR  > 5 14 cm-Ankle 14 *NR  > 32      EMG+     Side Muscle Nerve Root Ins Act Fibs Psw Amp Dur Poly Recrt Int Pat Comment   Right Vastus Med Femoral L2-L4 Nml Nml Nml Nml Nml 0 Nml Nml    Right Tib Anterior Fibular,  Deep Fibula... L4-L5 Nml Nml Nml *Incr Nml 0 Nml Nml    Right Fib Longus Fibular,  Superficial... L5-S1 Nml Nml Nml *Incr Nml 0 Nml Nml    Right Gastroc Tibial S1-S2 Nml Nml Nml Nml Nml 0 Nml Nml    Right Dorsal Interossei ped I Lateral Plantar S2-S3 Nml Nml Nml Nml Nml 0 Nml Nml    Right Lumbo Parasp (Lower) Rami L5-S1 Nml Nml Nml         Right Gluteus Med Sup Gluteal L5-S1 Nml Nml Nml Nml Nml 0 Nml Nml    Left Tib Anterior Fibular,  Deep Fibula... L4-L5 Nml Nml Nml Nml Nml 0 Nml Nml    Left Gastroc Tibial S1-S2 Nml Nml Nml Nml Nml 0 Nml Nml    Left Dorsal Interossei ped I Lateral Plantar S2-S3 Nml Nml Nml Nml Nml 0 Nml Nml    Left Fib Longus Fibular,  Superficial... L5-S1 Nml Nml Nml Nml Nml 0 Nml Nml    Left Vastus Med Femoral L2-L4 Nml Nml Nml Nml Nml 0 Nml Nml    Left Lumbo Parasp (Lower) Rami L5-S1 Nml Nml Nml                 Waveforms:    Motor                  Sensory

## 2025-04-08 ENCOUNTER — RESULTS FOLLOW-UP (OUTPATIENT)
Dept: NEUROLOGY | Facility: CLINIC | Age: 80
End: 2025-04-08
Payer: MEDICARE

## 2025-04-23 ENCOUNTER — PATIENT MESSAGE (OUTPATIENT)
Dept: INTERNAL MEDICINE | Facility: CLINIC | Age: 80
End: 2025-04-23
Payer: MEDICARE

## 2025-04-23 DIAGNOSIS — G62.9 NEUROPATHY: Primary | ICD-10-CM

## 2025-04-23 NOTE — TELEPHONE ENCOUNTER
No care due was identified.  [facility] Embedded Care Due Messages. Reference number: [id number].   4/23/2025 3:50:50 PM CDT

## 2025-04-25 RX ORDER — CELECOXIB 200 MG/1
200 CAPSULE ORAL DAILY PRN
Qty: 30 CAPSULE | Refills: 0 | Status: SHIPPED | OUTPATIENT
Start: 2025-04-25

## 2025-04-28 ENCOUNTER — PATIENT MESSAGE (OUTPATIENT)
Dept: INTERNAL MEDICINE | Facility: CLINIC | Age: 80
End: 2025-04-28
Payer: MEDICARE

## 2025-05-06 ENCOUNTER — PATIENT MESSAGE (OUTPATIENT)
Dept: INTERNAL MEDICINE | Facility: CLINIC | Age: 80
End: 2025-05-06
Payer: MEDICARE

## 2025-05-07 NOTE — TELEPHONE ENCOUNTER
Patient refused to schedule appt, and states that she would just like for Dr. Pereira to see her message.  Patient is scheduled for an appt on 06/10/25.

## 2025-05-07 NOTE — TELEPHONE ENCOUNTER
No problem - we can add her on for a VV with me for Monday 5/12 at 2pm or Tuesday 5/13 at 3:30p if that works for her

## 2025-05-12 ENCOUNTER — TELEPHONE (OUTPATIENT)
Dept: INTERNAL MEDICINE | Facility: CLINIC | Age: 80
End: 2025-05-12
Payer: MEDICARE

## 2025-05-12 ENCOUNTER — OFFICE VISIT (OUTPATIENT)
Dept: INTERNAL MEDICINE | Facility: CLINIC | Age: 80
End: 2025-05-12
Attending: INTERNAL MEDICINE
Payer: MEDICARE

## 2025-05-12 VITALS — WEIGHT: 126 LBS | BODY MASS INDEX: 21.51 KG/M2 | HEIGHT: 64 IN

## 2025-05-12 DIAGNOSIS — M85.80 OSTEOPENIA WITH HIGH RISK OF FRACTURE: ICD-10-CM

## 2025-05-12 DIAGNOSIS — M85.80 OSTEOPENIA, UNSPECIFIED LOCATION: ICD-10-CM

## 2025-05-12 DIAGNOSIS — Z86.39 HISTORY OF HYPERPARATHYROIDISM: ICD-10-CM

## 2025-05-12 DIAGNOSIS — K21.9 GASTROESOPHAGEAL REFLUX DISEASE, UNSPECIFIED WHETHER ESOPHAGITIS PRESENT: ICD-10-CM

## 2025-05-12 DIAGNOSIS — G62.9 NEUROPATHY: ICD-10-CM

## 2025-05-12 DIAGNOSIS — E78.5 HYPERLIPIDEMIA, UNSPECIFIED HYPERLIPIDEMIA TYPE: ICD-10-CM

## 2025-05-12 DIAGNOSIS — R19.00 ABDOMINAL MASS, UNSPECIFIED ABDOMINAL LOCATION: Primary | ICD-10-CM

## 2025-05-12 DIAGNOSIS — R13.10 DYSPHAGIA, UNSPECIFIED TYPE: ICD-10-CM

## 2025-05-12 DIAGNOSIS — Z91.89 AT HIGH RISK FOR FRACTURE: ICD-10-CM

## 2025-05-12 DIAGNOSIS — E55.9 VITAMIN D DEFICIENCY DISEASE: ICD-10-CM

## 2025-05-12 DIAGNOSIS — E06.3 HASHIMOTO'S THYROIDITIS: ICD-10-CM

## 2025-05-12 DIAGNOSIS — I10 HYPERTENSION, UNSPECIFIED TYPE: ICD-10-CM

## 2025-05-12 RX ORDER — FAMOTIDINE 40 MG/1
40 TABLET, FILM COATED ORAL NIGHTLY
Qty: 30 TABLET | Refills: 3 | Status: SHIPPED | OUTPATIENT
Start: 2025-05-12 | End: 2026-05-12

## 2025-05-12 NOTE — PROGRESS NOTES
The patient location is: [place]  The chief complaint leading to consultation is: results review, ct chest results     Visit type: audiovisual    Face to Face time with patient: 50 min  53 minutes of total time spent on the encounter, which includes face to face time and non-face to face time preparing to see the patient (eg, review of tests), Obtaining and/or reviewing separately obtained history, Documenting clinical information in the electronic or other health record, Independently interpreting results (not separately reported) and communicating results to the patient/family/caregiver, or Care coordination (not separately reported).     Each patient to whom he or she provides medical services by telemedicine is:  (1) informed of the relationship between the physician and patient and the respective role of any other health care provider with respect to management of the patient; and (2) notified that he or she may decline to receive medical services by telemedicine and may withdraw from such care at any time.    Notes: Subjective:   Patient ID: Marly Maciel is a 79 y.o. female  Chief complaint:   Chief Complaint   Patient presents with    Results     HPI  Here for review of ct chest results from outside facility    Sarcoidosis in chest cavity:  Dx many years ago   Internist at that time of diagnsosis had imaging monthly and then resolved   Enlarged LN in chest cavity   Was told that the type of sarcoidosis that she had dose not recuur   Her current pulmonologist does not feel that due to     Her primary pulm Dr. Marks in [place] rec that she go to Sarcoidosis clinic at [facility] as he did not feel that her underlying mac contrib to enlarged LN in chest  [place] [facility] she thought may wantto have ct scan with contrast and deferring to Dr. Marks - pt agrees to reach out to Dr. Marks to discuss next best steps      Patulous esophaguos, dysphagia, reflux:   Followed by [facility] previously   Had egd  "6/2023  Taking tums   Chw swallow and drink right after  Feels like and gets     Soft tissue nodular opacity along the anterior abdominal wall (series 4/image 114) is only partially visualized, but was present on 7/26/2022   Of note, had us 8/2019 and no abnormality seen     Breast calcifications:   Had mmg 9/2024    Scoliosis  Was doing a lot of bending and work and had flare  Took celebrex and then improved  and stopped     Had nerve conduction study   Taking gpn 300mg capsule - ineffective but tablet much better in effectiveness and tolerated     Review of Systems   Constitutional:  Negative for activity change and unexpected weight change.   HENT:  Negative for hearing loss, rhinorrhea and trouble swallowing.    Eyes:  Negative for discharge and visual disturbance.   Respiratory:  Negative for chest tightness and wheezing.    Cardiovascular:  Negative for chest pain and palpitations.   Gastrointestinal:  Negative for blood in stool, constipation, diarrhea and vomiting.   Endocrine: Negative for polydipsia and polyuria.   Genitourinary:  Negative for difficulty urinating, dysuria, hematuria and menstrual problem.   Musculoskeletal:  Negative for arthralgias, joint swelling and neck pain.   Neurological:  Negative for weakness and headaches.   Psychiatric/Behavioral:  Negative for confusion and dysphoric mood.        Objective:  Vitals:    05/12/25 1406   Weight: 57.2 kg (126 lb)   Height: 5' 4" (1.626 m)     Body mass index is 21.63 kg/m².    Physical Exam  Constitutional:       General: She is not in acute distress.     Appearance: She is well-developed. She is not diaphoretic.   Eyes:      General:         Right eye: No discharge.         Left eye: No discharge.      Conjunctiva/sclera: Conjunctivae normal.   Pulmonary:      Effort: Pulmonary effort is normal. No respiratory distress.   Skin:     Findings: No erythema or rash.   Neurological:      Mental Status: She is alert and oriented to person, place, and " time.   Psychiatric:         Behavior: Behavior normal.         Thought Content: Thought content normal.         Judgment: Judgment normal.       Assessment:  1. Abdominal mass, unspecified abdominal location    2. Hypertension, unspecified type    3. Dysphagia, unspecified type    4. Hashimoto's thyroiditis    5. Hyperlipidemia, unspecified hyperlipidemia type    6. Neuropathy    7. Osteopenia with high risk of fracture    8. At high risk for fracture    9. History of hyperparathyroidism    10. Osteopenia, unspecified location    11. Vitamin D deficiency disease        Plan:  1. Abdominal mass, unspecified abdominal location  -     Ambulatory referral/consult to General Surgery; Future; Expected date: 05/19/2025    2. Hypertension, unspecified type  -     CBC Auto Differential; Future; Expected date: 05/12/2025  -     Comprehensive Metabolic Panel; Future; Expected date: 05/12/2025    3. Dysphagia, unspecified type  -     Ambulatory referral/consult to Gastroenterology; Future; Expected date: 05/19/2025    4. Hashimoto's thyroiditis  -     TSH; Future; Expected date: 05/12/2025    5. Hyperlipidemia, unspecified hyperlipidemia type  -     Comprehensive Metabolic Panel; Future; Expected date: 05/12/2025  -     Lipid Panel; Future; Expected date: 09/08/2025    6. Neuropathy  -     Vitamin B1; Future; Expected date: 09/08/2025    7. Osteopenia with high risk of fracture  -     PTH, intact; Future; Expected date: 05/12/2025  -     PHOSPHORUS; Future; Expected date: 05/12/2025    8. At high risk for fracture  -     PTH, intact; Future; Expected date: 05/12/2025  -     PHOSPHORUS; Future; Expected date: 05/12/2025  -     Ambulatory referral/consult to Endocrinology; Future; Expected date: 05/19/2025    9. History of hyperparathyroidism  -     PTH, intact; Future; Expected date: 05/12/2025  -     PHOSPHORUS; Future; Expected date: 05/12/2025    10. Osteopenia, unspecified location  -     Ambulatory referral/consult to  Endocrinology; Future; Expected date: 05/19/2025    11. Vitamin D deficiency disease  -     Vitamin D; Future; Expected date: 08/12/2025      Check albs this Wednesday nonfasting at [facility]   B1 and flp due in spet 2025  F/u with gi   Start pepcid  Did not start fosamax and rec holding off for now givne dysphagia and gerd sx - f/u with endo and check pth and phos level as well update vit d   Cont current regimen   F/u with Dr. Mclaughlin - she is obtaining disc of images from ct [facility] and will bring to an [facility] to upload pics soon   F/u with dr. Gan in [place]     Rtc iwht me in 4-6 weeks   All questions were answered and pt verbalized understanding of plan.     Rec f/u with breast specialist to determine if breast calcifications seen on prior mmg - if not rec dx mmg - I can order this or she have provider order follow studyies where imaging be been completed previously    Health Maintenance   Topic Date Due    Shingles Vaccine (1 of 2) Never done    RSV Vaccine (Age 60+ and Pregnant patients) (1 - 1-dose 75+ series) Never done    Mammogram  09/10/2025    TETANUS VACCINE  06/14/2026    DEXA Scan  09/04/2027    Colonoscopy  09/04/2028    Lipid Panel  09/06/2029    Hepatitis C Screening  Completed    Influenza Vaccine  Completed    COVID-19 Vaccine  Completed    Pneumococcal Vaccines (Age 50+)  Completed

## 2025-05-14 ENCOUNTER — LAB VISIT (OUTPATIENT)
Dept: LAB | Facility: HOSPITAL | Age: 80
End: 2025-05-14
Attending: INTERNAL MEDICINE
Payer: MEDICARE

## 2025-05-14 DIAGNOSIS — Z91.89 AT HIGH RISK FOR FRACTURE: ICD-10-CM

## 2025-05-14 DIAGNOSIS — E06.3 HASHIMOTO'S THYROIDITIS: ICD-10-CM

## 2025-05-14 DIAGNOSIS — E78.5 HYPERLIPIDEMIA, UNSPECIFIED HYPERLIPIDEMIA TYPE: ICD-10-CM

## 2025-05-14 DIAGNOSIS — I10 HYPERTENSION, UNSPECIFIED TYPE: ICD-10-CM

## 2025-05-14 DIAGNOSIS — M85.80 OSTEOPENIA WITH HIGH RISK OF FRACTURE: ICD-10-CM

## 2025-05-14 DIAGNOSIS — Z86.39 HISTORY OF HYPERPARATHYROIDISM: ICD-10-CM

## 2025-05-14 LAB
ABSOLUTE EOSINOPHIL (OHS): 0.17 K/UL
ABSOLUTE MONOCYTE (OHS): 0.57 K/UL (ref 0.3–1)
ABSOLUTE NEUTROPHIL COUNT (OHS): 3.72 K/UL (ref 1.8–7.7)
ALBUMIN SERPL BCP-MCNC: 3.6 G/DL (ref 3.5–5.2)
ALP SERPL-CCNC: 66 UNIT/L (ref 40–150)
ALT SERPL W/O P-5'-P-CCNC: 16 UNIT/L (ref 10–44)
ANION GAP (OHS): 8 MMOL/L (ref 8–16)
AST SERPL-CCNC: 23 UNIT/L (ref 11–45)
BASOPHILS # BLD AUTO: 0.05 K/UL
BASOPHILS NFR BLD AUTO: 0.9 %
BILIRUB SERPL-MCNC: 0.6 MG/DL (ref 0.1–1)
BUN SERPL-MCNC: 16 MG/DL (ref 8–23)
CALCIUM SERPL-MCNC: 9.1 MG/DL (ref 8.7–10.5)
CHLORIDE SERPL-SCNC: 103 MMOL/L (ref 95–110)
CO2 SERPL-SCNC: 31 MMOL/L (ref 23–29)
CREAT SERPL-MCNC: 0.7 MG/DL (ref 0.5–1.4)
ERYTHROCYTE [DISTWIDTH] IN BLOOD BY AUTOMATED COUNT: 12.7 % (ref 11.5–14.5)
GFR SERPLBLD CREATININE-BSD FMLA CKD-EPI: >60 ML/MIN/1.73/M2
GLUCOSE SERPL-MCNC: 82 MG/DL (ref 70–110)
HCT VFR BLD AUTO: 43.3 % (ref 37–48.5)
HGB BLD-MCNC: 14.6 GM/DL (ref 12–16)
IMM GRANULOCYTES # BLD AUTO: 0.02 K/UL (ref 0–0.04)
IMM GRANULOCYTES NFR BLD AUTO: 0.3 % (ref 0–0.5)
LYMPHOCYTES # BLD AUTO: 1.33 K/UL (ref 1–4.8)
MCH RBC QN AUTO: 33 PG (ref 27–31)
MCHC RBC AUTO-ENTMCNC: 33.7 G/DL (ref 32–36)
MCV RBC AUTO: 98 FL (ref 82–98)
NUCLEATED RBC (/100WBC) (OHS): 0 /100 WBC
PHOSPHATE SERPL-MCNC: 3.6 MG/DL (ref 2.7–4.5)
PLATELET # BLD AUTO: 192 K/UL (ref 150–450)
PMV BLD AUTO: 10.1 FL (ref 9.2–12.9)
POTASSIUM SERPL-SCNC: 3.8 MMOL/L (ref 3.5–5.1)
PROT SERPL-MCNC: 6.6 GM/DL (ref 6–8.4)
PTH-INTACT SERPL-MCNC: 43.4 PG/ML (ref 9–77)
RBC # BLD AUTO: 4.42 M/UL (ref 4–5.4)
RELATIVE EOSINOPHIL (OHS): 2.9 %
RELATIVE LYMPHOCYTE (OHS): 22.7 % (ref 18–48)
RELATIVE MONOCYTE (OHS): 9.7 % (ref 4–15)
RELATIVE NEUTROPHIL (OHS): 63.5 % (ref 38–73)
SODIUM SERPL-SCNC: 142 MMOL/L (ref 136–145)
TSH SERPL-ACNC: 3.59 UIU/ML (ref 0.4–4)
WBC # BLD AUTO: 5.86 K/UL (ref 3.9–12.7)

## 2025-05-14 PROCEDURE — 85025 COMPLETE CBC W/AUTO DIFF WBC: CPT

## 2025-05-14 PROCEDURE — 83970 ASSAY OF PARATHORMONE: CPT

## 2025-05-14 PROCEDURE — 84100 ASSAY OF PHOSPHORUS: CPT

## 2025-05-14 PROCEDURE — 36415 COLL VENOUS BLD VENIPUNCTURE: CPT | Mod: PN

## 2025-05-14 PROCEDURE — 82947 ASSAY GLUCOSE BLOOD QUANT: CPT

## 2025-05-14 PROCEDURE — 84443 ASSAY THYROID STIM HORMONE: CPT

## 2025-05-15 ENCOUNTER — PATIENT MESSAGE (OUTPATIENT)
Dept: INTERNAL MEDICINE | Facility: CLINIC | Age: 80
End: 2025-05-15
Payer: MEDICARE

## 2025-05-17 ENCOUNTER — RESULTS FOLLOW-UP (OUTPATIENT)
Dept: INTERNAL MEDICINE | Facility: CLINIC | Age: 80
End: 2025-05-17

## 2025-05-24 DIAGNOSIS — G62.9 NEUROPATHY: ICD-10-CM

## 2025-05-24 NOTE — TELEPHONE ENCOUNTER
No care due was identified.  [facility] Embedded Care Due Messages. Reference number: [id number].   5/24/2025 10:23:38 AM CDT

## 2025-05-26 NOTE — TELEPHONE ENCOUNTER
Refill Encounter    PCP Visits: Recent Visits  Date Type Provider Dept   05/12/25 Office Visit Stacy Pereira MD [facility]   12/03/24 Office Visit Stacy Pereira MD [facility]   08/29/24 Office Visit Stacy Pereira MD [facility]   Showing recent visits within past 360 days and meeting all other requirements  Future Appointments  Date Type Provider Dept   06/10/25 Appointment Stacy Pereira MD [facility]   Showing future appointments within next 720 days and meeting all other requirements      Last 3 Blood Pressure:   BP Readings from Last 3 Encounters:   03/27/25 128/84   12/09/24 116/79   12/03/24 122/84     Preferred Pharmacy:   [address] [address]  Phone: [phone] Fax: [phone]    Requested RX:  Requested Prescriptions     Pending Prescriptions Disp Refills    celecoxib (CELEBREX) 200 MG capsule [Pharmacy Med Name: CELECOXIB 200MG] 30 capsule 0     Sig: TAKE 1 CAPSULE (200 MG TOTAL) BY MOUTH DAILY AS NEEDED FOR PAIN.      RX Route: Normal

## 2025-05-27 RX ORDER — CELECOXIB 200 MG/1
200 CAPSULE ORAL DAILY PRN
Qty: 30 CAPSULE | Refills: 0 | Status: SHIPPED | OUTPATIENT
Start: 2025-05-27

## 2025-06-10 ENCOUNTER — OFFICE VISIT (OUTPATIENT)
Dept: INTERNAL MEDICINE | Facility: CLINIC | Age: 80
End: 2025-06-10
Attending: INTERNAL MEDICINE
Payer: MEDICARE

## 2025-06-10 ENCOUNTER — PATIENT MESSAGE (OUTPATIENT)
Dept: INTERNAL MEDICINE | Facility: CLINIC | Age: 80
End: 2025-06-10

## 2025-06-10 VITALS
HEART RATE: 60 BPM | DIASTOLIC BLOOD PRESSURE: 70 MMHG | WEIGHT: 131.81 LBS | SYSTOLIC BLOOD PRESSURE: 122 MMHG | HEIGHT: 64 IN | BODY MASS INDEX: 22.5 KG/M2 | OXYGEN SATURATION: 100 %

## 2025-06-10 DIAGNOSIS — M79.89 SOFT TISSUE MASS: ICD-10-CM

## 2025-06-10 DIAGNOSIS — M79.673 PAIN OF FOOT, UNSPECIFIED LATERALITY: Primary | ICD-10-CM

## 2025-06-10 DIAGNOSIS — M47.819 ARTHRITIS OF SPINE: ICD-10-CM

## 2025-06-10 DIAGNOSIS — E06.3 HASHIMOTO'S THYROIDITIS: ICD-10-CM

## 2025-06-10 DIAGNOSIS — G62.9 PERIPHERAL POLYNEUROPATHY: ICD-10-CM

## 2025-06-10 DIAGNOSIS — R05.9 COUGH, UNSPECIFIED TYPE: ICD-10-CM

## 2025-06-10 DIAGNOSIS — Z90.2 STATUS POST PARTIAL LOBECTOMY OF LUNG: ICD-10-CM

## 2025-06-10 PROCEDURE — G2211 COMPLEX E/M VISIT ADD ON: HCPCS | Mod: ,,, | Performed by: INTERNAL MEDICINE

## 2025-06-10 PROCEDURE — 99999 PR PBB SHADOW E&M-EST. PATIENT-LVL IV: CPT | Mod: PBBFAC,,, | Performed by: INTERNAL MEDICINE

## 2025-06-10 PROCEDURE — 99215 OFFICE O/P EST HI 40 MIN: CPT | Mod: S$PBB,,, | Performed by: INTERNAL MEDICINE

## 2025-06-10 PROCEDURE — 99214 OFFICE O/P EST MOD 30 MIN: CPT | Mod: PBBFAC | Performed by: INTERNAL MEDICINE

## 2025-06-10 RX ORDER — PROMETHAZINE HYDROCHLORIDE AND DEXTROMETHORPHAN HYDROBROMIDE 6.25; 15 MG/5ML; MG/5ML
5 SYRUP ORAL EVERY 8 HOURS PRN
Qty: 118 ML | Refills: 1 | Status: SHIPPED | OUTPATIENT
Start: 2025-06-10

## 2025-06-10 NOTE — PROGRESS NOTES
"Subjective:   Patient ID: Marly Maciel is a 79 y.o. female  Chief complaint:   Chief Complaint   Patient presents with    Hyperlipidemia     HPI  Here for 6 month follow up of htn:  Here for review of ct chest results from outside facility    We had VV 5/12/2025 to review ct scan and results to date and plan below:   Check albs this Wednesday nonfasting at LT   B1 and flp due in sept 2025  F/u with gi   Start pepcid  Did not start fosamax and rec holding off for now givne dysphagia and gerd sx - f/u with endo and check pth and phos level as well update vit d   Cont current regimen   F/u with Dr. Mclaughlin - she is obtaining disc of images from ct from [facility] and will bring to an [facility] to upload pics soon   F/u with dr. Gan in gi   Rtc iwht me in 4-6 weeks   All questions were answered and pt verbalized understanding of plan.     Since then scheduled ct chest with contrast through pulm at [facility] [place] scheduled for July   - ? Discussed iwht Dr. Mclaughlin and Eleni   - told new rx to be approved in late Aug for bronchiectasis and she is on top of list with pulm to start this - Brensocotib   CT chest with iv contrast scheduled in July  Mediastinal lad seen on ct from [facility],   Sending off sputum again for pulm   Treated for pseudomonas and tx with LQ and cough resolved - theodore flared again and started LQ again - just finished LQ last week after "croupy cough" a couple of weeks ago and had a rx for lq in case needed and now sx resolved     Dysphagia:  - did f/u with gi Dr. Gan   - drinks fluids right after eating   - has appt in July with Dr. Gan   Prev:   Patulous esophaguos, dysphagia, reflux:   Followed by [facility] previously   Had egd 6/2023  Taking tums   Can swallow and drink right after  Feels like and gets     Breast calcifications:   Had mmg 9/2024  Still due to f/u with eleni to determine if needs sooner breast imaging or if calcification seen on prior films  Previously:   Followed by Dr. Gage - " she agrees to f/u on if further/sooner imaging indicated vs all stable when compared to prior imagines     Soft tissue nodular opacity along the anterior abdominal wall (series 4/image 114) is only partially visualized, but was present on 7/26/2022   Of note, had us 8/2019 and no abnormality seen   Today: here for re-examination and discussed f/u with surg to determine if further imaging - us vs ct - next best step - she agrees to f/u with Dr. 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 Marks in [place] rec that she go to Sarcoidosis clinic at [facility] as he did not feel that her underlying mac contrib to enlarged LN in chest  [place] [facility] [place] she thought may want to have ct scan with contrast and deferring to Dr. Marks - pt agrees to reach out to Dr. Marks to discuss next best steps      Soft tissue nodular opacity along the anterior abdominal wall (series 4/image 114) is only partially visualized, but was present on 7/26/2022   Of note, had us 8/2019 and no abnormality seen     Scoliosis  Was doing a lot of bending and work and had flare  Took celebrex and then improved  and stopped - using prn       Hypothyroidism, hx of hashimotos and hyperparathyroidism dx 2/2 hyperCa diagnosed and treated with surgery 4/8/2014 at [facility]  Center and s/p parathyroidectomy and partial thyroidectomy:  - pth in normal range   - tsh wnl prev - due for tfts   - not requiring levothyroxine at this time         ### not addressed today #####    HTN:  Taking Bb and hctz   Home bp at goal   Previously:   -  Not needed ekg as not on clofazamine at this time - if resume will let me know and will reorder ekg at that time to check q 1-3 months moving forward    Bilateral foot toe numbness   Stable   Taking b1   Has been chronic abx for several years for MAC and sx suspect to be due to chronic med use - discussed role of emg and defers for now - if worsens will proceed  back pain at times if does too much - does not radiate    Osteopenia, high risk for hip fracture, hx of hyperPTH s/p parathyroidectomy:   - Dexa 9/2024 utd - reviewed - did not start fosamax - concerned about pot SE and will f/u with endo for further discussion of options   - cont with vit d and calcium    - walking     MAC:   Stable as below  Prev:   Just had visit to [place] with specialist and can stop all meds in October!  Previously:   Will need labs every 4 weeks for monitoring while on meds - cbc and bmp   Good news and off of 2 of 5 abx and to come back in 6 months and area on left lower lobe has imprved  PREVIOUSLY:   Hx of pulm mac, s/p lobectomy in [place]:   - sob - increased - seen by pulm as above - pfts excellent   + cultures again   - now taking azithro, ethambutol, tedizolid, omadacycline, amikacin inhaler   - needs cbc and cmp monthly - will order and follow   - now followed by Dr. Petty at [facility]"" as Dr. 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ales is retiring and now to be folloewd by Dr. Gerardo Mari   "Treatment through [facility] [place] [place].  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 Being seen at [facility] [place] [place] q 3mo. On omadacylcine plus tidezolid daily.  Now on arikayce three times per week because of courhg. This improved with three times per week regimen.  Seen by Son 2 weeks ago. Told sputum culture neg since 1/23. CT scan 2 weeks ago showed possible hydropneumothorax. Not clear that her pulmonologist agreed with this. Has no pumonary symptoms. Cough is better. Brings up 1 tabsp daily. No blood. No DOWNEY. Notes sob with bending over.   No fever, chills, nite sweats. No wt loss  Had recent esoph mannometry study wich she needs to review with GI. No DAVID symptoms"    Aortic ectasia - ascending aorta:  CT 8/2024 with ascending aorta 3.9cm and stable   - sunshine statin   Previously:    - had CT 2/2024 and 11/2023 - latter reported ascending aorta uln 3.9cm  - CT 7/2018 with aortic root 4cm and stable on 11/2018  - seen on CT [place] 7/2019 from [place] and reviewed last few CT scans - no progression reported on follow CT scan 2022 and  8/2023  - bp controlled   - sunshine statin     Atherosclerosis, HLD:   sunshine statin      DOWNEY/SOB:   Seen by Dr. Shultz 9//7/2023 for downey/sob:  - did have echo and stress echo as ordered and wnl per her     Hearing loss:   ENT Dr. Denson 8/9/2023   - started with hearing aids prn    Dysphagia: stable   Did see GI - Dr. Hamilton   Had mbss:   No tracheal aspiration witnessed with any consistency.  Prominent cricopharyngeus  - see speech pathology note for full details.  - EGD at end June      Mole:   Had bx and benign per pt;  Prev: " "  Had flat mole left suprapubic region   No drainage   Not itchy   Just noticed mole - fairly new   Derm: Wahl     Macular degeneration:   - followed by Dr. Nascimento   - on eye supplement      Cervical DJD:   - improved - no longer on celebrex - using [employer] book stand   Previously:   Attended PT   Followed by ortho - seen by ortho after PT at [facility]  Had CT scan of neck and went to receive injection - not improved and managing at this time    ############    HM:  Shingrix   Rsv   Mmg utd 9/2024  Dexa utd 9/2024    Review of Systems    Objective:  Vitals:    06/10/25 0855   BP: 122/70   Pulse: 60   SpO2: 100%   Weight: 59.8 kg (131 lb 13.4 oz)   Height: 5' 4" (1.626 m)     Body mass index is 22.63 kg/m².    Physical Exam  Vitals reviewed.   Constitutional:       Appearance: Normal appearance. She is well-developed.   HENT:      Head: Normocephalic and atraumatic.      Right Ear: Tympanic membrane, ear canal and external ear normal.      Left Ear: Tympanic membrane, ear canal and external ear normal.      Nose: Nose normal. No congestion.      Mouth/Throat:      Mouth: Mucous membranes are moist.      Pharynx: Oropharynx is clear. No oropharyngeal exudate.   Eyes:      Extraocular Movements: Extraocular movements intact.      Conjunctiva/sclera: Conjunctivae normal.   Neck:      Thyroid: No thyromegaly.   Cardiovascular:      Rate and Rhythm: Normal rate and regular rhythm.      Pulses: Normal pulses.      Heart sounds: Normal heart sounds.   Pulmonary:      Effort: Pulmonary effort is normal. No respiratory distress.      Breath sounds: Normal breath sounds. No stridor. No wheezing or rales.   Abdominal:      General: Bowel sounds are normal.      Palpations: Abdomen is soft.   Musculoskeletal:         General: No swelling or tenderness.      Cervical back: Neck supple.      Comments: No cyanosis   Lymphadenopathy:      Cervical: No cervical adenopathy.   Skin:     General: Skin is warm and dry.      " Capillary Refill: Capillary refill takes less than 2 seconds.   Neurological:      General: No focal deficit present.      Mental Status: She is alert and oriented to person, place, and time. Mental status is at baseline.   Psychiatric:         Behavior: Behavior normal.         Thought Content: Thought content normal.       Assessment:  1. Pain of foot, unspecified laterality    2. Cough, unspecified type    3. Status post partial lobectomy of lung    4. Soft tissue mass    5. Peripheral polyneuropathy    6. Arthritis of spine    7. Hashimoto's thyroiditis      Pain of foot, unspecified laterality  -     X-Ray Foot Complete Bilateral; Future; Expected date: 06/10/2025  -     Ankle brachial index; Future; Expected date: 06/17/2025  -     US Ankle/Brach Indices Low Ext W/O Str >= 3 Levels; Future; Expected date: 06/10/2025    Cough, unspecified type  -     promethazine-dextromethorphan (PROMETHAZINE-DM) 6.25-15 mg/5 mL Syrp; Take 5 mLs by mouth every 8 (eight) hours as needed (cough). maximum: 30 mL in 24 hours  Dispense: 118 mL; Refill: 1    Status post partial lobectomy of lung    Soft tissue mass    Peripheral polyneuropathy    Arthritis of spine    Hashimoto's thyroiditis      F/u with surgery and breast specialist  F/u with pulm as planned   Check approp studies     54 min spent in care of patient including chart review, history, orders, physical, orders and coordination of care  Visit today included increased complexity associated with the care of the episodic problem soft tissue mass, foot pain  addressed and managing the longitudinal care of the patient due to the serious and/or complex managed problem(s) PN, arthritis fo spine.    Health Maintenance   Topic Date Due    Shingles Vaccine (1 of 2) Never done    RSV Vaccine (Age 60+ and Pregnant patients) (1 - 1-dose 75+ series) Never done    Mammogram  09/10/2025    TETANUS VACCINE  06/14/2026    DEXA Scan  09/04/2027    Colonoscopy  09/04/2028    Lipid Panel   09/06/2029    Hepatitis C Screening  Completed    Influenza Vaccine  Completed    COVID-19 Vaccine  Completed    Pneumococcal Vaccines (Age 50+)  Completed

## 2025-06-16 ENCOUNTER — HOSPITAL ENCOUNTER (OUTPATIENT)
Dept: RADIOLOGY | Facility: HOSPITAL | Age: 80
Discharge: HOME | End: 2025-06-16
Attending: INTERNAL MEDICINE
Payer: MEDICARE

## 2025-06-16 ENCOUNTER — RESULTS FOLLOW-UP (OUTPATIENT)
Dept: INTERNAL MEDICINE | Facility: CLINIC | Age: 80
End: 2025-06-16

## 2025-06-16 DIAGNOSIS — M79.673 PAIN OF FOOT, UNSPECIFIED LATERALITY: ICD-10-CM

## 2025-06-16 PROCEDURE — 73630 X-RAY EXAM OF FOOT: CPT | Mod: TC,50,PN

## 2025-06-16 PROCEDURE — 73630 X-RAY EXAM OF FOOT: CPT | Mod: 26,50,, | Performed by: RADIOLOGY

## 2025-06-19 ENCOUNTER — HOSPITAL ENCOUNTER (OUTPATIENT)
Dept: RADIOLOGY | Facility: HOSPITAL | Age: 80
Discharge: HOME | End: 2025-06-19
Attending: INTERNAL MEDICINE
Payer: MEDICARE

## 2025-06-19 DIAGNOSIS — M79.673 PAIN OF FOOT, UNSPECIFIED LATERALITY: ICD-10-CM

## 2025-06-19 PROCEDURE — 93923 UPR/LXTR ART STDY 3+ LVLS: CPT | Mod: 26,,, | Performed by: RADIOLOGY

## 2025-06-19 PROCEDURE — 93923 UPR/LXTR ART STDY 3+ LVLS: CPT | Mod: TC

## 2025-06-26 DIAGNOSIS — G62.9 NEUROPATHY: ICD-10-CM

## 2025-06-26 RX ORDER — CELECOXIB 200 MG/1
200 CAPSULE ORAL DAILY PRN
Qty: 30 CAPSULE | Refills: 3 | Status: SHIPPED | OUTPATIENT
Start: 2025-06-26

## 2025-06-26 NOTE — TELEPHONE ENCOUNTER
Care Due:                  Date            Visit Type   Department     Provider  --------------------------------------------------------------------------------                                EP -                              PRIMARY      [facility] INTERNAL  Last Visit: 06-      CARE ([facility])   MEDICINE       Stacy Pereira                              ESTABLISHED                              PATIENT -    [facility] INTERNAL  Next Visit: 09-      VIRTUAL      MEDICINE       Stacy Pereira                                                            Last  Test          Frequency    Reason                     Performed    Due Date  --------------------------------------------------------------------------------    Lipid Panel.  12 months..  atorvastatin.............  09- 09-    [facility] Embedded Care Due Messages. Reference number: [id number].   6/26/2025 12:45:36 PM CDT

## 2025-07-08 ENCOUNTER — OFFICE VISIT (OUTPATIENT)
Dept: ENDOCRINOLOGY | Facility: CLINIC | Age: 80
End: 2025-07-08
Payer: MEDICARE

## 2025-07-08 DIAGNOSIS — K22.89 PATULOUS LOWER ESOPHAGEAL SPHINCTER: ICD-10-CM

## 2025-07-08 DIAGNOSIS — Z90.89 H/O PARATHYROIDECTOMY: ICD-10-CM

## 2025-07-08 DIAGNOSIS — E06.3 HASHIMOTO'S THYROIDITIS: ICD-10-CM

## 2025-07-08 DIAGNOSIS — M81.0 AGE-RELATED OSTEOPOROSIS WITHOUT CURRENT PATHOLOGICAL FRACTURE: Primary | ICD-10-CM

## 2025-07-08 DIAGNOSIS — Z98.890 H/O PARATHYROIDECTOMY: ICD-10-CM

## 2025-07-08 DIAGNOSIS — Z91.89 AT HIGH RISK FOR FRACTURE: ICD-10-CM

## 2025-07-08 PROCEDURE — 98002 SYNCH AUDIO-VIDEO NEW MOD 45: CPT | Mod: 95,,, | Performed by: STUDENT IN AN ORGANIZED HEALTH CARE EDUCATION/TRAINING PROGRAM

## 2025-07-08 PROCEDURE — G2211 COMPLEX E/M VISIT ADD ON: HCPCS | Mod: 95,,, | Performed by: STUDENT IN AN ORGANIZED HEALTH CARE EDUCATION/TRAINING PROGRAM

## 2025-07-08 RX ORDER — ZOLEDRONIC ACID 5 MG/100ML
5 INJECTION, SOLUTION INTRAVENOUS
OUTPATIENT
Start: 2025-07-14

## 2025-07-08 RX ORDER — HEPARIN 100 UNIT/ML
500 SYRINGE INTRAVENOUS
OUTPATIENT
Start: 2025-07-14

## 2025-07-08 RX ORDER — SODIUM CHLORIDE 0.9 % (FLUSH) 0.9 %
10 SYRINGE (ML) INJECTION
OUTPATIENT
Start: 2025-07-14

## 2025-07-08 NOTE — PROGRESS NOTES
ENDOCRINOLOGY NEW PATIENT VISIT: 07/08/2025    The patient location is: Home  The chief complaint leading to consultation is: Osteoporosis    Visit type: audiovisual    Face to Face time with patient: 35 minutes  45 minutes of total time spent on the encounter, which includes face to face time and non-face to face time preparing to see the patient (eg, review of tests), Obtaining and/or reviewing separately obtained history, Documenting clinical information in the electronic or other health record, Independently interpreting results (not separately reported) and communicating results to the patient/family/caregiver, or Care coordination (not separately reported).     Each patient to whom he or she provides medical services by telemedicine is:  (1) informed of the relationship between the physician and patient and the respective role of any other health care provider with respect to management of the patient; and (2) notified that he or she may decline to receive medical services by telemedicine and may withdraw from such care at any time.    Subjective:      Patient ID: Marly Maciel is a 79 y.o. female.    Chief Complaint:  Osteoporosis    History of Present Illness      Marly Maciel presents today for evaluation of osteopenia with elevated FRAX score.     She has a history of Mycobacterium disease and bronchitis since 2009, previously followed at [facility] with quarterly visits. She recently experienced a pseudomonas lung infection requiring antibiotic treatment. Her lung condition was particularly challenging during the initial disease course but has stabilized over time. She previously used prednisone during an acute period of severe lung disease but is not currently on steroid medication. She was recently diagnosed with patulous esophagus. CTs suggest severe acid reflux, though she denies any personal experience of reflux symptoms or use of antacid medications. She has a history of  Hashimoto's thyroiditis, currently asymptomatic and not requiring medication. 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 She underwent hysterectomy in  with ovaries preserved. She entered menopause in her late 40s following the procedure. She currently takes Caltrate gummies with vitamin D, two daily. She previously took estrogen replacement therapy for approximately 25 years following her hysterectomy, now discontinued. She typically walks 3-4 miles daily in [facility], though currently reduced due to summer heat. She does not use assistive devices for walking and declines indoor walking or treadmill use. She has never used tobacco and consumes limited alcohol. Her mother lived to  and  of massive stroke. Her father  of massive heart attack. She denies family history of osteoporosis in parents.            Regarding Osteopenia/Osteoporosis:    - Most recent DEXA: 2024    FINDINGS:  The bone mineral density measured from L1 through L4 is 0.961g/cm2.  This corresponds to a T score of -1.9 and a Z score of 0.1.  No significant interval change.  The bone mineral density within the left femoral neck measures 0.835 g/cm2.  This corresponds to a T score of -1.5 and a Z score of 0.7.  The bone mineral density within the right femoral neck measures 0.752 g/cm2.  This corresponds to a T score of -2.1 and a Z score of 0.1.  Decrease in bone mineral density by 5.7%.     FRAX RESULTS:  10-year Probability of Fracture:  Major Osteoporotic Fracture 13.4%.  Hip Fracture 3.9%.     Impression:  Osteopenia.       Lab Results   Component Value Date    PTH 43.4 2025    PTH 48.0 2015    PTH 46 2014    [id number] 44 2024    DSJSYGUB29JO 34 10/05/2023    DWOQMQFR10GQ 32 2022    CALCIUM 9.1 2025    CALCIUM 9.6 2024    CALCIUM 9.4 07/10/2024    PHOS 3.6  05/14/2025    PHOS 3.6 10/23/2020    PHOS 2.9 10/17/2012    ALKPHOS 66 05/14/2025    ALKPHOS 65 09/06/2024    ALKPHOS 78 10/05/2023    TSH 3.590 05/14/2025    LABCALC 31.6 (H) 04/04/2012    FPTFYLH98GIA 13.8 (H) 04/04/2012       - Fracture history   - Never had a fracture    - Past osteoporosis medication: None  - Current osteoporosis medication: None    - Calcium intake:  Caltrate gummy taken with 2 a day.      - Vit D3 intake:  Via gummy as above    - Post-menopausal age:  Late 40's.  Partial hysterectomy in the 1990's.      - Pertinent factors:  No   Yes  [x]    []  Tobacco use  []    [x]  Alcohol use (once a week)  [x]    []  Current diarrhea or history of malabsorption (Celiac disease)  [x]    []  Thyroid disease  [x]    []  Anemia  [x]    []  Kidney stones  []    [x]  Hyperparathyroidism (history of hyperparathyroidism with surgery at [facility] around 2013/14)  [x]    []  High risk medications include: none  [x]    []  Recent falls  [x]    []  Height loss greater than 2 inches  []    [x]  Tolerating weight-bearing exercise    - Patient uses ambulatory devices: none  - Sense of balance: None    - Significant history or physical findings:  No   Yes  []    [x]  Estrogen replacement therapy after menopause (was on therapy for about 20)  [x]    []  Mother or father with hip/spine fracture or diagnosed with osteoporosis  [x]    []  History of malignancy involving bone (such as metastasis)  [x]    []  Prior radiation treatment  [x]    []  Dental work planned      ROS:   As above    Objective:     There were no vitals taken for this visit.  BP Readings from Last 3 Encounters:   06/10/25 122/70   03/27/25 128/84   12/09/24 116/79     Wt Readings from Last 1 Encounters:   06/10/25 0855 59.8 kg (131 lb 13.4 oz)     There is no height or weight on file to calculate BMI.    Physical Exam    General: No acute distress. Nontoxic appearing.  Psychiatric: Normal mood. Normal affect. No evidence of SI.           Lab Review:   Lab  "Results   Component Value Date    HGBA1C 5.0 07/13/2021     Lab Results   Component Value Date    CHOL 155 09/06/2024    HDL 74 09/06/2024    LDLCALC 68.2 09/06/2024    TRIG 64 09/06/2024    CHOLHDL 47.7 09/06/2024     Lab Results   Component Value Date     05/14/2025    K 3.8 05/14/2025     05/14/2025    CO2 31 (H) 05/14/2025    GLU 82 05/14/2025    BUN 16 05/14/2025    CREATININE 0.7 05/14/2025    CALCIUM 9.1 05/14/2025    PROT 6.6 05/14/2025    ALBUMIN 3.6 05/14/2025    BILITOT 0.6 05/14/2025    ALKPHOS 66 05/14/2025    AST 23 05/14/2025    ALT 16 05/14/2025    ANIONGAP 8 05/14/2025    ESTGFRAFRICA >60.0 07/13/2021    EGFRNONAA >60.0 07/13/2021    TSH 3.590 05/14/2025     Vit D, 25-Hydroxy   Date Value Ref Range Status   09/06/2024 44 30 - 96 ng/mL Final     Comment:     Vitamin D deficiency.........<10 ng/mL                              Vitamin D insufficiency......10-29 ng/mL       Vitamin D sufficiency........> or equal to 30 ng/mL  Vitamin D toxicity............>100 ng/mL         Assessment and Plan       Recommend Reclast infusion for osteoporosis treatment due to high fracture risk and esophageal issues precluding oral bisphosphonates.  Opted for Reclast over Prolia due to simpler once-yearly dosing and shorter treatment duration (3 years vs. 5+ years for Prolia).  Reviewed history, including parathyroid tumor removal and normal recent labs, to ensure appropriateness of Reclast.  Plan to reassess treatment efficacy with DEXA scan in September 2026.    AGE-RELATED OSTEOPOROSIS WITHOUT CURRENT PATHOLOGICAL FRACTURE:  - Explained mechanism of action for bisphosphonates (Reclast, Fosamax) and Prolia in treating osteoporosis.  - Educated on risks of untreated osteoporosis, including increased fracture risk, height loss, and potential mobility issues.  - Explained concept of "drug holiday" after 3 years of Reclast treatment to prevent long-term complications.  - Patient to stay hydrated on day of " Reclast infusion.  - Patient to inform dentist about Reclast therapy before any major dental procedures and continue regular dental cleanings and care.  - Discussed potential side effects of Reclast infusion, including flu-like symptoms, muscle cramps, and rare risks such as osteonecrosis of jaw and uveitis.  - Ordered Reclast (zoledronic acid) infusion to be administered at [facility] once yearly for 3 years.  - Follow up in 1 year to evaluate response to therapy and determine if stable for repeat Reclast dosing.  - Continued Caltrate gummies (calcium and vitamin D supplement), 2 gummies daily.    AT HIGH RISK FOR FRACTURE:  - Educated on risks of untreated osteoporosis, including increased fracture risk, height loss, and potential mobility issues.  - Contact office if any fractures occur before next scheduled visit.  - Continued Caltrate gummies (calcium and vitamin D supplement), 2 gummies daily.    HASHIMOTO'S:  - History of Hashimoto's  - TSH has been normal without concerns  - Not requiring therapy    HISTORY OF PARATHYROIDECTOMY:    - Had parathyroid surgery around 2013-14 for hypercalcemia  - Recent labs stable for calcium and PTH levels, no concerns    PLAN SUMMARY:  - Ordered Reclast (zoledronic acid) infusion, to be administered yearly for 3 years at [facility]  - Continue Caltrate gummies (calcium and vitamin D supplement), 2 gummies daily  - Educated patient on osteoporosis risks, Reclast mechanism of action, and potential side effects  - Patient to inform dentist about Reclast therapy before major dental procedures  - Contact office if any fractures occur before next scheduled visit         RTC in 1 year.        **Visit today included increased complexity associated with the care of the problems addressed and managing the longitudinal care of the patient due to the serious and/or complex managed problems      Arash Rao, DO     This note was generated with the  assistance of ambient listening technology. Verbal consent was obtained by the patient and accompanying visitor(s) for the recording of patient appointment to facilitate this note. I attest to having reviewed and edited the generated note for accuracy, though some syntax or spelling errors may persist. Please contact the author of this note for any clarification.       Reclast Counseling and Consent Discussion (Telemedicine Visit):    During todays telemedicine visit, I spoke with the patient in detail regarding the initiation of Reclast (zoledronic acid) infusion for the treatment of osteoporosis. I explained that Reclast is administered via intravenous (IV) infusion, and discussed the associated risks of IV access, including bruising, discomfort at the site, and risk of infection.    We thoroughly reviewed the potential side effects of Reclast, including:  Flu-like symptoms (fever, chills, body aches)  Muscle and joint pain (arthralgias/myalgias)  Low calcium levels (hypocalcemia)  Osteonecrosis of the jaw (ONJ)  Atypical femur fractures  Worsening kidney function (rare if no underlying dysfunction at baseline)  Eye inflammation (such as uveitis or scleritis)    I explained that Reclast is typically administered once yearly, infused over at least 15 minutes, and that patients should be well hydrated and have normal renal function prior to treatment.    We also discussed that untreated osteoporosis can progress, leading to:  Vertebral and hip fractures  Chronic pain and height loss  Loss of mobility  Increased risk of death following hip fracture    I reviewed that alternative treatment options are available (e.g., oral bisphosphonates, denosumab, etc.), but may be less potent, associated with different risks, or have insurance limitations. After this discussion, the patient agreed that Reclast would be accepted as treatment to pursue.    The patient was given the opportunity to ask questions, expressed  understanding of the medication, the risks and benefits, and gave verbal consent to proceed with the Reclast infusion as discussed. The plan is for the infusion center to follow up to confirm and witness this consent conversation per protocol and they will help in setting up this future infusion.

## 2025-07-08 NOTE — PATIENT INSTRUCTIONS
Osteoporosis Treatment     Regardless if you are on a prescription medication for osteoporosis or osteopenia, one should still do all of the following. All of these things combined help to reduce your fracture risk. The goal of all these treatments is to reduce your risk of fracture.      Take Calcium and Vitamin D- It is important to get a minimum amount of 1200 mg of calcium daily. That can be in the diet or in the form of a supplement. Also a minimum of 800 IU of Vitamin D should be taken a day. Taking a prescription medication does not take the place of taking Calcium plus vitamin D. Some trials show a reduced fracture risk with taking calcium and vitamin D.   Weight bearing exercise- this is extremely important to reduce fracture risk. Trials have shown that weight bearing exercise can reduce the risk of a hip fracture. It may also help with your bone density. At minimum one should do 30 minutes of weight bearing exercise 3 times a week. Yoga and Riky Chi can often also help with balance.   Fall Precautions- the 1st goal in preventing a fracture is not falling. Always wear good shoes and avoid heals. Make sure you check your house to make sure there is nothing that could be a fall risk (leroy, cords). Make sure if you get up at night that there is some lighting. Be mindful with pets as they can be common reasons for a fall. We often times feel safe in our own home, but that is a common area that one can have a fracture. If you usually use a walker or a cane, make sure you use it in the home as well.      Bone Density- once a prescription medication is started, we check your bone density every 2 years. It usually does not need to be checked more than that as your bone changes very slowly. Always keep in mind the goal of therapy is to reduce your fracture risk and not normalize your bone density. Sometimes you may see a slight improvement in your bone density with treatment or no change at all. That is ok. One of  the main reasons to do a bone density is to make sure there is not a decrease in your bone density     Drug Holidays- this does not apply to Prolia. We only do drug holidays for Fosamax, Reclast, Actonel and Boniva. Stopping or delaying Prolia can cause a rebound loss of bone density and in rare cases a compression fracture.      Risks of Medications for Osteoporosis  Most patients tolerate these medications without any problems. The following do not include all the side effects of these medications  Rarely patients can develop pains with these medications. They usually will go away. However, if they are severe you should let us know immediately  Osteonecrosis of the Jaw (ONJ)- this is a rare complication of many bone medications. It is diagnosed by a dentist or oral surgeon. If you develop pain in your jaw let us know and we have you see your dentist for an evaluation. Most cases are in patients with cancer who get much larger doses of these medications. The overall risk is 1 in 10,000 to 1 in 100,000 patient years. It is always important to get regular dental cleanings. If you are planning an invasive dental procedure we may ask that you complete that prior to starting treatment.   Atypical Femur Fractures- This is also a rare side effect of these medications. The risk increases the longer you are on these medications. This is one reason we sometimes do drug holidays. This can usually present with thigh or groin pain. The overall risk is 3.2 to 50 cases per 100,000 patient years

## 2025-07-28 ENCOUNTER — TELEPHONE (OUTPATIENT)
Dept: INTERNAL MEDICINE | Facility: CLINIC | Age: 80
End: 2025-07-28
Payer: MEDICARE

## 2025-07-28 NOTE — TELEPHONE ENCOUNTER
----- Message from Stpehanie sent at 7/28/2025 10:18 AM CDT -----  Regarding: Orders-Barium Swallow Test, Internal Gastroenterology Referral & Infusion for Osteoporosis  7/28/2025      Hello,       I received a call from Marly Maciel [[id number]] regarding scheduling a Barium Swallow Test, Internal Gastroenterology Referral & Infusion for Osteoporosis. No orders are in the system.Please put a referral in the system so she can be scheduled. She can be reached at [phone].      Thank you,   Stephanie FUNES   Access Navigator

## 2025-07-30 DIAGNOSIS — R20.0 NUMBNESS IN FEET: ICD-10-CM

## 2025-07-30 DIAGNOSIS — G60.9 IDIOPATHIC PERIPHERAL NEUROPATHY: ICD-10-CM

## 2025-07-30 RX ORDER — GABAPENTIN 600 MG/1
TABLET ORAL
Qty: 30 TABLET | Refills: 0 | OUTPATIENT
Start: 2025-07-30

## 2025-07-30 NOTE — TELEPHONE ENCOUNTER
"S/w pt. Informed pt of recommendations from her last visit with Dr. Pereira and pt remembered the whole discussion. Gave pt all the info/phone numbers she may need, pt aware, verbalized understanding by stating "thank you"  "

## 2025-08-06 ENCOUNTER — PATIENT OUTREACH (OUTPATIENT)
Dept: ADMINISTRATIVE | Facility: HOSPITAL | Age: 80
End: 2025-08-06
Payer: MEDICARE

## 2025-09-03 ENCOUNTER — PATIENT OUTREACH (OUTPATIENT)
Dept: ADMINISTRATIVE | Facility: HOSPITAL | Age: 80
End: 2025-09-03
Payer: MEDICARE

## (undated) DEVICE — COVER PROBE US 5.5X58L NON LTX

## (undated) DEVICE — DRESSING TRANS 2X2 TEGADERM

## (undated) DEVICE — GLOVE PROTEXIS PI CLASSIC 7.5

## (undated) DEVICE — SEE MEDLINE ITEM 156918

## (undated) DEVICE — SET MICROPUNCTUREECHO STIFF

## (undated) DEVICE — CHLORAPREP W TINT 26ML APPL

## (undated) DEVICE — SET MICPUNC ACC STIFF CANNULA

## (undated) DEVICE — GLOVE BIOGEL SKINSENSE PI 7.5

## (undated) DEVICE — GLOVE SURGEON SYN PF SZ 9

## (undated) DEVICE — GLOVE BIOGEL SKINSENSE PI 8.0

## (undated) DEVICE — CHLORAPREP 1.5 ML FREPP

## (undated) DEVICE — GUIDEWIRE LAUREATE 035IN 180CM

## (undated) DEVICE — KIT PROBE COVER WITH GEL

## (undated) DEVICE — SUT 3-0 VICRYL / SH (J416)

## (undated) DEVICE — DRAPE THYROID WITH ARMBOARD

## (undated) DEVICE — FORCEP ADSON SATIN WITH TEETH

## (undated) DEVICE — GLOVE PROTEXIS PI CLASSIC 8.0

## (undated) DEVICE — TRAY PORT IR PLACEMENT REMOVAL

## (undated) DEVICE — DRESSING MEPORE ISLAND 31/2X4

## (undated) DEVICE — Device

## (undated) DEVICE — TRAY SUTURE REMOVAL IRIS SCIS

## (undated) DEVICE — WIRE MANDRIL 98/60CM

## (undated) DEVICE — GLOVE BIOGEL SKINSENSE PI 8.5

## (undated) DEVICE — APPLICATOR CHLORAPREP ORN 26ML

## (undated) DEVICE — SCALPEL #15 BLADE STRL DISP.

## (undated) DEVICE — SEE MEDLINE ITEM 152511

## (undated) DEVICE — SUT SILK 2.0 BLK 18

## (undated) DEVICE — CLOSURE SKIN STERI STRIP 1/2X4

## (undated) DEVICE — POWERPICC® CATHETER WITH SHERLOCK TIP LOCATION SYSTEM (TLS) STYLET 5F DUAL-LUMEN FULL TRAY
Type: IMPLANTABLE DEVICE | Status: NON-FUNCTIONAL
Brand: POWERPICC® CATHETER